# Patient Record
Sex: MALE | Race: WHITE | NOT HISPANIC OR LATINO | Employment: FULL TIME | URBAN - METROPOLITAN AREA
[De-identification: names, ages, dates, MRNs, and addresses within clinical notes are randomized per-mention and may not be internally consistent; named-entity substitution may affect disease eponyms.]

---

## 2019-12-05 ENCOUNTER — OFFICE VISIT (OUTPATIENT)
Dept: MEDICAL GROUP | Facility: IMAGING CENTER | Age: 44
End: 2019-12-05
Payer: COMMERCIAL

## 2019-12-05 VITALS
DIASTOLIC BLOOD PRESSURE: 96 MMHG | WEIGHT: 212.2 LBS | HEART RATE: 73 BPM | BODY MASS INDEX: 32.16 KG/M2 | HEIGHT: 68 IN | RESPIRATION RATE: 17 BRPM | TEMPERATURE: 98.3 F | SYSTOLIC BLOOD PRESSURE: 144 MMHG | OXYGEN SATURATION: 98 %

## 2019-12-05 DIAGNOSIS — Z13.1 SCREENING FOR DIABETES MELLITUS: ICD-10-CM

## 2019-12-05 DIAGNOSIS — G89.29 CHRONIC MIDLINE LOW BACK PAIN WITHOUT SCIATICA: ICD-10-CM

## 2019-12-05 DIAGNOSIS — Z23 NEED FOR VACCINATION: ICD-10-CM

## 2019-12-05 DIAGNOSIS — R03.0 ELEVATED BLOOD PRESSURE READING: Primary | ICD-10-CM

## 2019-12-05 DIAGNOSIS — M54.50 CHRONIC MIDLINE LOW BACK PAIN WITHOUT SCIATICA: ICD-10-CM

## 2019-12-05 DIAGNOSIS — Z13.0 SCREENING FOR DEFICIENCY ANEMIA: ICD-10-CM

## 2019-12-05 DIAGNOSIS — M54.2 NECK PAIN: ICD-10-CM

## 2019-12-05 DIAGNOSIS — Z76.89 ENCOUNTER TO ESTABLISH CARE WITH NEW DOCTOR: ICD-10-CM

## 2019-12-05 DIAGNOSIS — Z13.220 SCREENING FOR HYPERLIPIDEMIA: ICD-10-CM

## 2019-12-05 PROCEDURE — 99214 OFFICE O/P EST MOD 30 MIN: CPT | Mod: 25 | Performed by: NURSE PRACTITIONER

## 2019-12-05 ASSESSMENT — PAIN SCALES - GENERAL: PAINLEVEL: 1=MINIMAL PAIN

## 2019-12-05 ASSESSMENT — PATIENT HEALTH QUESTIONNAIRE - PHQ9: CLINICAL INTERPRETATION OF PHQ2 SCORE: 0

## 2019-12-06 PROBLEM — R03.0 ELEVATED BLOOD PRESSURE READING: Status: ACTIVE | Noted: 2019-12-06

## 2019-12-06 PROBLEM — G89.29 CHRONIC MIDLINE LOW BACK PAIN WITHOUT SCIATICA: Status: ACTIVE | Noted: 2019-12-06

## 2019-12-06 PROBLEM — M54.50 CHRONIC MIDLINE LOW BACK PAIN WITHOUT SCIATICA: Status: ACTIVE | Noted: 2019-12-06

## 2019-12-06 PROBLEM — M54.2 NECK PAIN: Status: ACTIVE | Noted: 2019-12-06

## 2019-12-06 NOTE — ASSESSMENT & PLAN NOTE
States in 2015 [facility]  States that he has never seeked care for this injury.  He said neck pain has become worse since moving to [place].  States that he has intermittent pain from the back of his right side of his head/upper neck to his right shoulder.  Describes pain as achy and sharp at times with movement.  Denies any over-the-counter medication or remedies at this time.
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
## 2019-12-06 NOTE — ASSESSMENT & PLAN NOTE
Denies knowledge of having high blood pressure in the past.  Denies any hypertensive symptoms.  States that he recently moved from [place], and does not currently have a regular exercise regimen or diet.  States he is currently living in week-to-week hotel that does not have a kitchen.  States that he is eating out regularly.  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  States that he is having a hard time meal prepping, due to the lack of kitchen and work schedule.  States he is more physically active during the winter months because he enjoys snowboarding, and looks forward to having access here in [place].

## 2019-12-06 NOTE — ASSESSMENT & PLAN NOTE
States that this is an ongoing concern.  Denies any history of trauma or injury to lower back.  Denies any sciatic-like symptoms.  Denies any red flag symptoms.  Describes pain is dull and achy.  Denies any over-the-counter medications or home remedies to relieve pain.

## 2019-12-09 PROCEDURE — 90686 IIV4 VACC NO PRSV 0.5 ML IM: CPT | Performed by: NURSE PRACTITIONER

## 2019-12-09 PROCEDURE — 90471 IMMUNIZATION ADMIN: CPT | Performed by: NURSE PRACTITIONER

## 2019-12-21 ENCOUNTER — HOSPITAL ENCOUNTER (OUTPATIENT)
Dept: LAB | Facility: MEDICAL CENTER | Age: 44
End: 2019-12-21
Attending: NURSE PRACTITIONER
Payer: COMMERCIAL

## 2019-12-21 DIAGNOSIS — R03.0 ELEVATED BLOOD PRESSURE READING: ICD-10-CM

## 2019-12-21 DIAGNOSIS — Z13.1 SCREENING FOR DIABETES MELLITUS: ICD-10-CM

## 2019-12-21 DIAGNOSIS — Z13.0 SCREENING FOR DEFICIENCY ANEMIA: ICD-10-CM

## 2019-12-21 DIAGNOSIS — Z13.220 SCREENING FOR HYPERLIPIDEMIA: ICD-10-CM

## 2019-12-21 LAB
ALBUMIN SERPL BCP-MCNC: 4.4 G/DL (ref 3.2–4.9)
ALBUMIN/GLOB SERPL: 1.4 G/DL
ALP SERPL-CCNC: 101 U/L (ref 30–99)
ALT SERPL-CCNC: 51 U/L (ref 2–50)
ANION GAP SERPL CALC-SCNC: 10 MMOL/L (ref 0–11.9)
AST SERPL-CCNC: 28 U/L (ref 12–45)
BASOPHILS # BLD AUTO: 1 % (ref 0–1.8)
BASOPHILS # BLD: 0.06 K/UL (ref 0–0.12)
BILIRUB SERPL-MCNC: 0.7 MG/DL (ref 0.1–1.5)
BUN SERPL-MCNC: 11 MG/DL (ref 8–22)
CALCIUM SERPL-MCNC: 9.2 MG/DL (ref 8.5–10.5)
CHLORIDE SERPL-SCNC: 105 MMOL/L (ref 96–112)
CHOLEST SERPL-MCNC: 258 MG/DL (ref 100–199)
CO2 SERPL-SCNC: 22 MMOL/L (ref 20–33)
CREAT SERPL-MCNC: 1.01 MG/DL (ref 0.5–1.4)
EOSINOPHIL # BLD AUTO: 0.1 K/UL (ref 0–0.51)
EOSINOPHIL NFR BLD: 1.6 % (ref 0–6.9)
ERYTHROCYTE [DISTWIDTH] IN BLOOD BY AUTOMATED COUNT: 39.8 FL (ref 35.9–50)
FASTING STATUS PATIENT QL REPORTED: NORMAL
GLOBULIN SER CALC-MCNC: 3.1 G/DL (ref 1.9–3.5)
GLUCOSE SERPL-MCNC: 236 MG/DL (ref 65–99)
HCT VFR BLD AUTO: 50 % (ref 42–52)
HDLC SERPL-MCNC: ABNORMAL MG/DL
HGB BLD-MCNC: 17.4 G/DL (ref 14–18)
IMM GRANULOCYTES # BLD AUTO: 0.01 K/UL (ref 0–0.11)
IMM GRANULOCYTES NFR BLD AUTO: 0.2 % (ref 0–0.9)
LDLC SERPL CALC-MCNC: ABNORMAL MG/DL
LYMPHOCYTES # BLD AUTO: 2.54 K/UL (ref 1–4.8)
LYMPHOCYTES NFR BLD: 40.8 % (ref 22–41)
MCH RBC QN AUTO: 30.7 PG (ref 27–33)
MCHC RBC AUTO-ENTMCNC: 34.8 G/DL (ref 33.7–35.3)
MCV RBC AUTO: 88.3 FL (ref 81.4–97.8)
MONOCYTES # BLD AUTO: 0.36 K/UL (ref 0–0.85)
MONOCYTES NFR BLD AUTO: 5.8 % (ref 0–13.4)
NEUTROPHILS # BLD AUTO: 3.16 K/UL (ref 1.82–7.42)
NEUTROPHILS NFR BLD: 50.6 % (ref 44–72)
NRBC # BLD AUTO: 0 K/UL
NRBC BLD-RTO: 0 /100 WBC
PLATELET # BLD AUTO: 163 K/UL (ref 164–446)
PMV BLD AUTO: 11 FL (ref 9–12.9)
POTASSIUM SERPL-SCNC: 4.2 MMOL/L (ref 3.6–5.5)
PROT SERPL-MCNC: 7.5 G/DL (ref 6–8.2)
RBC # BLD AUTO: 5.66 M/UL (ref 4.7–6.1)
SODIUM SERPL-SCNC: 137 MMOL/L (ref 135–145)
TRIGL SERPL-MCNC: 1182 MG/DL (ref 0–149)
WBC # BLD AUTO: 6.2 K/UL (ref 4.8–10.8)

## 2019-12-21 PROCEDURE — 36415 COLL VENOUS BLD VENIPUNCTURE: CPT

## 2019-12-21 PROCEDURE — 85025 COMPLETE CBC W/AUTO DIFF WBC: CPT

## 2019-12-21 PROCEDURE — 80061 LIPID PANEL: CPT

## 2019-12-21 PROCEDURE — 80053 COMPREHEN METABOLIC PANEL: CPT

## 2019-12-22 NOTE — RESULT ENCOUNTER NOTE
Please let patient know that we are sending his labs in letter form due to not having Mychart. Please the patient if he was fasting at the time of his lab draw because of his elevated glucose and lipid panel. We can discuss his labs at his next appointment, but it is my recommendation at this time to attempt to reach a goal of accumulation of 150 minutes or more of moderate-intensity activity each week as tolerated. Discussed a healthy diet that is low in fat, sodium, and cholesterol, such as the mediterranean diet that is typically high in fruits, vegetables, whole grains, beans, nuts, and seeds, includes olive oil as an important source of monounsaturated fat, or also considering a plant-based diet. Also please ask patient if he has been able to monitor his blood pressure. It was my recommendation at his last appointment to check his BP 10 days after his last visit.   Tiff RICH

## 2019-12-26 ENCOUNTER — TELEPHONE (OUTPATIENT)
Dept: MEDICAL GROUP | Facility: IMAGING CENTER | Age: 44
End: 2019-12-26

## 2019-12-26 NOTE — TELEPHONE ENCOUNTER
----- Message from AGUSTÍN Gee sent at 12/22/2019 12:48 PM PST -----  Please let patient know that we are sending his labs in letter form due to not having Mychart. Please the patient if he was fasting at the time of his lab draw because of his elevated glucose and lipid panel. We can discuss his labs at his next appointment, but it is my recommendation at this time to attempt to reach a goal of accumulation of 150 minutes or more of moderate-intensity activity each week as tolerated. Discussed a healthy diet that is low in fat, sodium, and cholesterol, such as the mediterranean diet that is typically high in fruits, vegetables, whole grains, beans, nuts, and seeds, includes olive oil as an important source of monounsaturated fat, or also considering a plant-based diet. Also please ask patient if he has been able to monitor his blood pressure. It was my recommendation at his last appointment to check his BP 10 days after his last visit.   Tiff RICH

## 2020-01-06 ENCOUNTER — OFFICE VISIT (OUTPATIENT)
Dept: MEDICAL GROUP | Facility: IMAGING CENTER | Age: 45
End: 2020-01-06
Payer: COMMERCIAL

## 2020-01-06 VITALS
HEART RATE: 85 BPM | RESPIRATION RATE: 14 BRPM | SYSTOLIC BLOOD PRESSURE: 126 MMHG | BODY MASS INDEX: 32.58 KG/M2 | HEIGHT: 68 IN | DIASTOLIC BLOOD PRESSURE: 84 MMHG | OXYGEN SATURATION: 96 % | WEIGHT: 215 LBS | TEMPERATURE: 98.4 F

## 2020-01-06 DIAGNOSIS — Z13.1 SCREENING FOR DIABETES MELLITUS: ICD-10-CM

## 2020-01-06 DIAGNOSIS — G89.29 CHRONIC MIDLINE LOW BACK PAIN WITHOUT SCIATICA: ICD-10-CM

## 2020-01-06 DIAGNOSIS — M54.2 NECK PAIN: ICD-10-CM

## 2020-01-06 DIAGNOSIS — M54.50 CHRONIC MIDLINE LOW BACK PAIN WITHOUT SCIATICA: ICD-10-CM

## 2020-01-06 DIAGNOSIS — R74.01 ELEVATED ALT MEASUREMENT: ICD-10-CM

## 2020-01-06 DIAGNOSIS — S81.812D LACERATION OF LEFT LOWER EXTREMITY, SUBSEQUENT ENCOUNTER: Primary | ICD-10-CM

## 2020-01-06 DIAGNOSIS — R03.0 ELEVATED BLOOD PRESSURE READING: ICD-10-CM

## 2020-01-06 DIAGNOSIS — Z13.220 SCREENING FOR HYPERLIPIDEMIA: ICD-10-CM

## 2020-01-06 PROCEDURE — 99214 OFFICE O/P EST MOD 30 MIN: CPT | Performed by: NURSE PRACTITIONER

## 2020-01-06 RX ORDER — HYDROCODONE BITARTRATE AND ACETAMINOPHEN 5; 325 MG/1; MG/1
1 TABLET ORAL
COMMUNITY
Start: 2019-12-29 | End: 2020-01-26

## 2020-01-06 RX ORDER — CEPHALEXIN 500 MG/1
500 CAPSULE ORAL
COMMUNITY
Start: 2019-12-28 | End: 2020-01-07

## 2020-01-07 NOTE — PROGRESS NOTES
Subjective:     CC: Laceration (left leg, snowboarding incident December 28th )      HPI:   Diogo presents today for follow-up for elevated blood pressure reading on 12/5/19, and recent laceration that he sustained on a recent vacation.    States that he was snowboarding with his girlfriend in [place], [place] on 12/28/19. States when his girlfriend and him were getting off the lift, and his girlfriend fell causing him to fall and landed on his girlfriend's snowboard. States he seeked emergency services at local emergency room. States he received his Tdap at ED due to outdoor wound. States and according to ED note he sustained a full thickness, 10-15 cm laceration exposing the muscle belly, at that time no visable tendinous or nerve involvement. X-ray at the time did not suggest a fracture. States that he is currently on Keflex 500 mg 4 times a day for 10 days. States that he has one more day of antibiotics. States he was prescribed Norco for the pain, but has only taken it three times since the accident. States that he was instructed to were a soft cast boot, but has stopped the past few days because of it irritating the wound. States that he also was told to use crutches, but he has just been using one.  States his pain is minimal at site of injury. States to clean wound he has been using alcohol and preparation H wipes. States that he keeps it covered while at work and takes off bandage once home. States that he has noted minimal drainage from wound. States he was told to have stitches out about 10 days after injury. States that swelling has decreased, states that wound is beginning to itch mildly, denies itching. Denies fever, chills, or general muscle aches since injury.    Elevated blood pressure reading  12/5/19 BP: 144/96.  States that he was unable to obtain blood pressure between this appointment and last appointment. States that he has been in the ED twice since last visit and BP readings were 12/28/19:  124/102, 19: 142/90, reports both times he was in pain due to acute cut on shin. Today's BP: 126/84. Denies hypertension symptoms at this time including headache, SOB, chest pain, tachycardia, an/or edema. States due to current injury he has not been exercising since accident on 19. Currently not medication for elevated blood pressure.      Chronic midline low back pain without sciatica  This is on-going concern. States that his referral was sent to a PT clinic that is not near his house, and would like a new referral sent closer to his home.    Neck pain  This is on-going concern. States that his referral was sent to a PT clinic that is not near his house, and would like a new referral sent closer to his home.    History reviewed. No pertinent past medical history.    Social History     Tobacco Use   • Smoking status: Former Smoker     Packs/day: 0.00     Types: Cigarettes     Last attempt to quit: 2017     Years since quittin.1   • Smokeless tobacco: Former User   • Tobacco comment: States that he quit in 2017, previous he would smoke when he was stressed.   Substance Use Topics   • Alcohol use: Yes     Comment: occasional - 5-10 drinks per week   • Drug use: Yes     Types: Marijuana     Comment: occasional     Current Outpatient Medications Ordered in Epic   Medication Sig Dispense Refill   • HYDROcodone-acetaminophen (NORCO) 5-325 MG Tab per tablet Take 1 Tab by mouth.     • cephALEXin (KEFLEX) 500 MG Cap Take 500 mg by mouth.     • IBUPROFEN PO Take  by mouth.       No current Epic-ordered facility-administered medications on file.      Allergies:  Codeine    Health Maintenance: Completed. Received Tdap in ED on 19.    ROS:  Constitutional: Denies fever, chills, night sweats, weight loss/gain or malaise/fatigue.   Respiratory: Denies cough, SOB at rest or activity.    Cardiovascular: Denies tachycardia, chest pain, palpitations, or  leg swelling.   Gastrointestinal: Denies N/V/C/D,  "abdominal pain, loss appetite, reflux, or hematochezia.  Genitourinary: Denies difficulty voiding, dysuria, nocturia, or hematuria.   Skin: Negative for rash or worrisome moles. See HPI.  Neurological: Negative for dizziness, focal weakness and headaches.   Endo/Heme/Allergies: Denies bruise/bleed easily, allergies.   Psychiatric/Behavioral: Denies depression, nervous/anxious, difficulty sleeping.    Objective:   Exam:  /84 (BP Location: Left arm, Patient Position: Sitting, BP Cuff Size: Adult)   Pulse 85   Temp 36.9 °C (98.4 °F) (Temporal)   Resp 14   Ht 1.727 m (5' 8\")   Wt 97.5 kg (215 lb)   SpO2 96%   BMI 32.69 kg/m²  Body mass index is 32.69 kg/m².  General: Normal appearing. No distress.  Pulmonary: Clear to ausculation.  Normal effort. No rales, ronchi, or wheezing.  Cardiovascular: Regular rate and rhythm without murmur. Pedal and radial pulses are intact and equal bilaterally.  Abdomen: Soft, nontender, nondistended. Normal bowel sounds. Liver and spleen are not palpable  Skin: Warm and dry.  No obvious lesions.  Musculoskeletal: Modified gait using one crutch. No extremity cyanosis or clubbing. Left lower anterior shin with mild edema, non-pitting. Well perfused above and below laceration. 15 cm healing laceration with intact sutures,well approximated, mild erythema at suture line, no redness around site of injury, or streaking from site. Erythema at site blanchable. Scant amount of dried crusted yellow drainage at incision line. No bleeding or added warmth at time. No bruising noted. Lower extremity muscle strength 5/5, DTR +2.   Psych: Normal mood and affect. Alert and oriented x3. Judgment and insight is normal.    Assessment & Plan:   1. Laceration of left lower extremity, subsequent encounter  Instructed to complete oral antibiotics. Discussed referral to wound clinic to assess wound and for suture removal, verbalized understanding. Discussed symptoms of infection at site, and instructed " to RTC if increase pain, itchiness, drainage, decreased perfusion, swelling, chills, fever, or general body aches. Instructed to stop using alcohol and wipes to incision. Instructed to only clean with showering and mild soap without scrubbing incision. Instructed to change bandage regularly. Encouraged to use ice up to 4 times a day for 20 minutes each time for swelling. Instructed to take 200-600 mg Ibuprofen up to TID for no longer than 3 days. To take with food to avoid GI upset. Discussed referral to Sports Medicine for further elevation for muscle involvement. At this time continue to use crutches until evaluation by Sports Medicine, verbalized understanding. Discussed referral and benefit of PT, verbalized understanding.    - REFERRAL TO SPORTS MEDICINE  - REFERRAL TO PHYSICAL THERAPY Reason for Therapy: Eval/Treat/Report  - REFERRAL TO WOUND CLINIC    3. Neck pain  Discussed sending referral to PT for clinic near home and work, verbalized understanding.  - REFERRAL TO PHYSICAL THERAPY Reason for Therapy: Eval/Treat/Report    4. Chronic midline low back pain without sciatica  Discussed sending referral to PT for clinic near home and work, verbalized understanding.  - REFERRAL TO PHYSICAL THERAPY Reason for Therapy: Eval/Treat/Report    5. Screening for hyperlipidemia  6. Elevated ALT measurement  7. Screening for diabetes mellitus  Discussed with patient repeating labs due to not appropriately fasting. Discussed with patient not eating and only drinking water for 8-10 hours before lab draw, verbalized understanding.    - Lipid Profile; Future  - Comp Metabolic Panel; Future  - HEMOGLOBIN A1C; Future      Return in about 2 weeks (around 1/20/2020).    Please note that this dictation was created using voice recognition software. I have made every reasonable attempt to correct obvious errors, but I expect that there are errors of grammar and possibly content that I did not discover before finalizing the  note.

## 2020-01-07 NOTE — ASSESSMENT & PLAN NOTE
12/5/19 BP: 144/96.  States that he was unable to obtain blood pressure between this appointment and last appointment. States that he has been in the ED twice since last visit and BP readings were 12/28/19: 124/102, 12/29/19: 142/90, reports both times he was in pain due to acute cut on shin. Today's BP: 126/84. Denies hypertension symptoms at this time including headache, SOB, chest pain, tachycardia, an/or edema. States due to current injury he has not been exercising since accident on 12/28/19. Currently not medication for elevated blood pressure.

## 2020-01-07 NOTE — ASSESSMENT & PLAN NOTE
This is on-going concern. States that his referral was sent to a PT clinic that is not near his house, and would like a new referral sent closer to his home.

## 2020-01-10 ENCOUNTER — OFFICE VISIT (OUTPATIENT)
Dept: MEDICAL GROUP | Facility: IMAGING CENTER | Age: 45
End: 2020-01-10
Payer: COMMERCIAL

## 2020-01-10 VITALS
DIASTOLIC BLOOD PRESSURE: 82 MMHG | HEART RATE: 92 BPM | TEMPERATURE: 98.6 F | RESPIRATION RATE: 16 BRPM | BODY MASS INDEX: 31.67 KG/M2 | SYSTOLIC BLOOD PRESSURE: 122 MMHG | OXYGEN SATURATION: 96 % | HEIGHT: 68 IN | WEIGHT: 209 LBS

## 2020-01-10 DIAGNOSIS — S81.812D LACERATION OF LEFT LOWER EXTREMITY, SUBSEQUENT ENCOUNTER: ICD-10-CM

## 2020-01-10 PROCEDURE — 99212 OFFICE O/P EST SF 10 MIN: CPT | Performed by: NURSE PRACTITIONER

## 2020-01-10 RX ORDER — DOXYCYCLINE HYCLATE 100 MG
100 TABLET ORAL 2 TIMES DAILY
Qty: 14 TAB | Refills: 0 | Status: SHIPPED | OUTPATIENT
Start: 2020-01-10 | End: 2020-01-26

## 2020-01-10 ASSESSMENT — PATIENT HEALTH QUESTIONNAIRE - PHQ9: CLINICAL INTERPRETATION OF PHQ2 SCORE: 0

## 2020-01-11 ENCOUNTER — HOSPITAL ENCOUNTER (OUTPATIENT)
Dept: LAB | Facility: MEDICAL CENTER | Age: 45
End: 2020-01-11
Attending: NURSE PRACTITIONER
Payer: COMMERCIAL

## 2020-01-11 DIAGNOSIS — R74.01 ELEVATED ALT MEASUREMENT: ICD-10-CM

## 2020-01-11 DIAGNOSIS — Z13.1 SCREENING FOR DIABETES MELLITUS: ICD-10-CM

## 2020-01-11 DIAGNOSIS — Z13.220 SCREENING FOR HYPERLIPIDEMIA: ICD-10-CM

## 2020-01-11 LAB
ALBUMIN SERPL BCP-MCNC: 4.5 G/DL (ref 3.2–4.9)
ALBUMIN/GLOB SERPL: 1.6 G/DL
ALP SERPL-CCNC: 88 U/L (ref 30–99)
ALT SERPL-CCNC: 47 U/L (ref 2–50)
ANION GAP SERPL CALC-SCNC: 8 MMOL/L (ref 0–11.9)
AST SERPL-CCNC: 28 U/L (ref 12–45)
BILIRUB SERPL-MCNC: 0.7 MG/DL (ref 0.1–1.5)
BUN SERPL-MCNC: 16 MG/DL (ref 8–22)
CALCIUM SERPL-MCNC: 9.4 MG/DL (ref 8.5–10.5)
CHLORIDE SERPL-SCNC: 105 MMOL/L (ref 96–112)
CHOLEST SERPL-MCNC: 284 MG/DL (ref 100–199)
CO2 SERPL-SCNC: 23 MMOL/L (ref 20–33)
CREAT SERPL-MCNC: 1.03 MG/DL (ref 0.5–1.4)
EST. AVERAGE GLUCOSE BLD GHB EST-MCNC: 275 MG/DL
FASTING STATUS PATIENT QL REPORTED: NORMAL
GLOBULIN SER CALC-MCNC: 2.8 G/DL (ref 1.9–3.5)
GLUCOSE SERPL-MCNC: 246 MG/DL (ref 65–99)
HBA1C MFR BLD: 11.2 % (ref 0–5.6)
HDLC SERPL-MCNC: ABNORMAL MG/DL
LDLC SERPL CALC-MCNC: ABNORMAL MG/DL
POTASSIUM SERPL-SCNC: 4.3 MMOL/L (ref 3.6–5.5)
PROT SERPL-MCNC: 7.3 G/DL (ref 6–8.2)
SODIUM SERPL-SCNC: 136 MMOL/L (ref 135–145)
TRIGL SERPL-MCNC: 1382 MG/DL (ref 0–149)

## 2020-01-11 PROCEDURE — 83036 HEMOGLOBIN GLYCOSYLATED A1C: CPT

## 2020-01-11 PROCEDURE — 80053 COMPREHEN METABOLIC PANEL: CPT

## 2020-01-11 PROCEDURE — 36415 COLL VENOUS BLD VENIPUNCTURE: CPT

## 2020-01-11 PROCEDURE — 80061 LIPID PANEL: CPT

## 2020-01-11 NOTE — PROCEDURES
Suture Removal  Date/Time: 1/11/2020 11:00 AM  Performed by: AGUSTÍN Gee  Authorized by: AGUSTÍN Gee   Body area: lower extremity  Location details: left lower leg  Wound Appearance: red, clean and tender  Sutures Removed: 11  Post-removal: Steri-Strips applied  Patient tolerance: Patient tolerated the procedure well with no immediate complications  Comments: Amount of sutures is undocumented by placing facility. Sutures placed on 12/28/19, recommended to remove after 10 days by PCP. Sutures counted with patient, and agreed that 11 sutures were visible for removal. Scant amount of bleeding at site with removal. Pressure applied and able to stop with ease, no complications. Suture remained well approximated with removal, steri-strips placed in alternating fashion. Scant amount of drainage noted at site, crusted yellow, no bruising noted, improving swelling noted around laceration and lower left leg. Good perfusion at site, above, and below laceration. At proximal site near knee, wound continues to have mild erythema. Removal completed in a clean technique fashion.

## 2020-01-11 NOTE — PROGRESS NOTES
Denies worsening pain, continues to use one crutch for support when walking. States that by the end of the day that his leg is sore from standing all day. Denies fever or chills, or joint pain above or below laceration site. States that he is using heat and ice for pain control. States that he has stopped using rubbing alcohol and preparation H wipes to laceration since last visit.    Plan:   Patient instructed to use only ice on healing laceration, 4 times a day up to 20 minutes each time. Instructed not to use due to increase risk of infection. Instructed to take Ibuprofen 200-600 mg up to TID as tolerated for pain with food to prevent GI upset and GI bleeding. Instructed to not take longer than 3 days in a row.  Instructed to keep steri-strips on until they fall off naturally. May replace steri-strips if they fall off in 24 hours. Instruction of how to do this given to patient. Instructed to not scrub site in shower to just let soap water flow over laceration. Instructed to maintain appointment with Dr. Mele Guerrero for further evaluation of muscle involvement from injury. Discussed with patient continue antibiotic treatment due to continued erythema of laceration of doxycycline 100 mg BID for 7 days, verbalized understanding.    Follow up in 7 days for further evaluation of laceration.

## 2020-01-13 ENCOUNTER — TELEPHONE (OUTPATIENT)
Dept: MEDICAL GROUP | Facility: IMAGING CENTER | Age: 45
End: 2020-01-13

## 2020-01-13 ENCOUNTER — OFFICE VISIT (OUTPATIENT)
Dept: MEDICAL GROUP | Facility: CLINIC | Age: 45
End: 2020-01-13
Payer: COMMERCIAL

## 2020-01-13 VITALS
WEIGHT: 209 LBS | BODY MASS INDEX: 31.67 KG/M2 | HEART RATE: 88 BPM | HEIGHT: 68 IN | RESPIRATION RATE: 16 BRPM | TEMPERATURE: 98.6 F | SYSTOLIC BLOOD PRESSURE: 122 MMHG | DIASTOLIC BLOOD PRESSURE: 82 MMHG | OXYGEN SATURATION: 97 %

## 2020-01-13 DIAGNOSIS — S81.812D LACERATION OF LEFT LOWER EXTREMITY, SUBSEQUENT ENCOUNTER: ICD-10-CM

## 2020-01-13 DIAGNOSIS — M79.605 PAIN OF LEFT LOWER EXTREMITY: ICD-10-CM

## 2020-01-13 PROCEDURE — 99202 OFFICE O/P NEW SF 15 MIN: CPT | Performed by: FAMILY MEDICINE

## 2020-01-13 ASSESSMENT — ENCOUNTER SYMPTOMS
SHORTNESS OF BREATH: 0
DIZZINESS: 0
VOMITING: 0
NAUSEA: 0
FEVER: 0
CHILLS: 0

## 2020-01-13 NOTE — TELEPHONE ENCOUNTER
----- Message from AGUSTÍN Gee sent at 1/13/2020  9:43 AM PST -----  Please let patient know that his total cholesterol and triglycerides are elevated. Also his A1c and fasting glucose are also elevated. We will discuss this further at his scheduled appointment on 1/17/2020.  His elevated lipid panel and A1c both warrant developing a plan of care to treat. We need him to reschedule for an earlier time that day if possible be able to have Darline provide him with diabetic information. If he can't reschedule I will provide this education.  Thank you,   HILARIO Gee

## 2020-01-13 NOTE — RESULT ENCOUNTER NOTE
Please let patient know that his total cholesterol and triglycerides are elevated. Also his A1c and fasting glucose are also elevated. We will discuss this further at his scheduled appointment on 1/17/2020.  His elevated lipid panel and A1c both warrant developing a plan of care to treat. We need him to reschedule for an earlier time that day if possible be able to have Darline provide him with diabetic information. If he can't reschedule I will provide this education.  Thank you,   Tiff Young, SHOSHANAC

## 2020-01-14 ENCOUNTER — APPOINTMENT (OUTPATIENT)
Dept: WOUND CARE | Facility: MEDICAL CENTER | Age: 45
End: 2020-01-14
Attending: NURSE PRACTITIONER
Payer: COMMERCIAL

## 2020-01-14 NOTE — PROGRESS NOTES
Subjective:      Diogo Keen is a 44 y.o. male who presents with Leg Injury (Referral from PCP/ L leg injury )     Referred by PABLO Gee for evaluation of LEFT leg laceration    HPI   LEFT leg laceration  DOI, 12/28/19  Getting off chair lift while snowboarding in [facility] and landed on his significant other's snowboard  Deep laceration  Seen in Er for deep laceration with muscle belly showing  Improved  Wound healing nicely  Has some pain with deep squatting  Taking ibuprofen and tylenol most days  Dull ache  Lateral proximal LEFT leg  No radiation  Improved with resting and ambulating with cane assistance  No night symptoms    Likes to snowboard  Teacher, 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   Respiratory: Negative for shortness of breath.    Cardiovascular: Negative for chest pain.   Gastrointestinal: Negative for nausea and vomiting.   Neurological: Negative for dizziness.     PMH:  has no past medical history on file.  MEDS:   Current Outpatient Medications:   •  doxycycline (VIBRAMYCIN) 100 MG Tab, Take 1 Tab by mouth 2 times a day., Disp: 14 Tab, Rfl: 0  •  HYDROcodone-acetaminophen (NORCO) 5-325 MG Tab per tablet, Take 1 Tab by mouth., Disp: , Rfl:   •  IBUPROFEN PO, Take  by mouth., Disp: , Rfl:   ALLERGIES:   Allergies   Allergen Reactions   • Codeine      Upset stomach     SURGHX:   Past Surgical History:   Procedure Laterality Date   • APPENDECTOMY       SOCHX:  reports that he quit smoking about 2 years ago. His smoking use included cigarettes. He smoked 0.00 packs per day. He has quit using smokeless tobacco. He reports current alcohol use. He reports current drug use. Drug: Marijuana.  FH: Family history was reviewed, no pertinent findings to report     Objective:     /82 (BP Location: Left arm, Patient Position: Sitting, BP Cuff Size: Large adult)   Pulse 88   Temp 37 °C (98.6 °F) (Temporal)   Resp  "16   Ht 1.727 m (5' 8\")   Wt 94.8 kg (209 lb)   SpO2 97%   BMI 31.78 kg/m²      Physical Exam     RIGHT leg:  There is NO swelling noted at the leg  Range of motion intact with dorsiflexion and plantarflexion, inversion and eversion  The foot and ankle is otherwise neurovascularly intact    LEFT leg:  LEFT leg scar healing well, no discharge  There is NO swelling noted at the ankle  Good strength with active dorsiflexion, plantarflexion, inversion and eversion of the LEFT ankle  Some mild difficulty walking on his LEFT heel, but he is able to do so  The foot and ankle is otherwise neurovascularly intact    LEFT FOOT:  There is NO swelling noted at the foot  There is NO tenderness at the base of the fifth metatarsal, cuboid, or tarsal navicular  There is NO pain with metatarsal squeeze test    NEUTRAL stance  Able to ambulate with MILDLY antalgic gait     Assessment/Plan:     1. Pain of left lower extremity     2. Laceration of left lower extremity, subsequent encounter       DOI, 12/28/19  Getting off chair lift while snowboarding in [facility] and landed on his significant other's snowboard  Deep laceration  Apparently, lesion was to the muscle belly  He still had functioning muscle at the time of laceration which he showed me on his phone (video)    Currently, he seems to be doing very well  He seems to be healing as expected    Recommended AVOIDING any athletic activity until at least 1 month from the injury (end of January 2020)  Prior to even considering going up for snowboarding he should verify that he can go for this and jog/run without any pain.    For now, he can follow-up with me as needed  He has been instructed that he can reach us by [employer] message if he has any questions or issues  Obviously if he has worsening of symptoms or any other problems he should come back in for repeat visit    Thank you PABLO Gee for allowing me to participate in caring for your patient.  "

## 2020-01-17 ENCOUNTER — OFFICE VISIT (OUTPATIENT)
Dept: MEDICAL GROUP | Facility: IMAGING CENTER | Age: 45
End: 2020-01-17
Payer: COMMERCIAL

## 2020-01-17 VITALS
RESPIRATION RATE: 16 BRPM | WEIGHT: 206.4 LBS | DIASTOLIC BLOOD PRESSURE: 88 MMHG | TEMPERATURE: 98.4 F | OXYGEN SATURATION: 97 % | HEART RATE: 87 BPM | HEIGHT: 68 IN | SYSTOLIC BLOOD PRESSURE: 128 MMHG | BODY MASS INDEX: 31.28 KG/M2

## 2020-01-17 DIAGNOSIS — Z79.899 ENCOUNTER FOR LONG-TERM CURRENT USE OF MEDICATION: ICD-10-CM

## 2020-01-17 DIAGNOSIS — E78.1 HYPERTRIGLYCERIDEMIA: ICD-10-CM

## 2020-01-17 DIAGNOSIS — S81.812D LACERATION OF LEFT LOWER EXTREMITY, SUBSEQUENT ENCOUNTER: ICD-10-CM

## 2020-01-17 DIAGNOSIS — E11.69 TYPE 2 DIABETES MELLITUS WITH OTHER SPECIFIED COMPLICATION, WITHOUT LONG-TERM CURRENT USE OF INSULIN (HCC): Primary | ICD-10-CM

## 2020-01-17 DIAGNOSIS — R73.09 ELEVATED HEMOGLOBIN A1C: ICD-10-CM

## 2020-01-17 PROCEDURE — 99215 OFFICE O/P EST HI 40 MIN: CPT | Performed by: NURSE PRACTITIONER

## 2020-01-17 RX ORDER — FENOFIBRATE 40 MG/1
1 TABLET ORAL DAILY
Qty: 30 TAB | Refills: 1 | Status: SHIPPED | OUTPATIENT
Start: 2020-01-17 | End: 2020-02-14

## 2020-01-17 RX ORDER — LANCETS 30 GAUGE
EACH MISCELLANEOUS
Qty: 100 EACH | Refills: 0 | Status: SHIPPED | OUTPATIENT
Start: 2020-01-17 | End: 2020-02-07

## 2020-01-17 RX ORDER — GLUCOSAMINE HCL/CHONDROITIN SU 500-400 MG
CAPSULE ORAL
Qty: 100 EACH | Refills: 0 | Status: SHIPPED | OUTPATIENT
Start: 2020-01-17 | End: 2020-06-05

## 2020-01-17 ASSESSMENT — PAIN SCALES - GENERAL: PAINLEVEL: NO PAIN

## 2020-01-18 NOTE — PATIENT INSTRUCTIONS
An accumulation of 150 minutes or more of moderate-intensity activity each week as tolerated. Discussed a healthy diet that is low in fat, sodium, and cholesterol, such as the mediterranean diet that is typically high in fruits, vegetables, whole grains, beans, nuts, and seeds, includes olive oil as an important source of monounsaturated fat, or also consider a plant-based diet.    Goal A1c is less than 7%, your current A1c is 11.2%  -Metformin 500 mg in the evening for 10 days, then 500 mg in the am and pm for 10 days with meals. Possible loose stool.  Fenoglide 40 mg daily in evening for 4 weeks.  CoQ10 daily  Omega 3 Fish oil daily

## 2020-01-19 PROBLEM — E78.1 HYPERTRIGLYCERIDEMIA: Status: ACTIVE | Noted: 2020-01-19

## 2020-01-19 PROBLEM — E11.9 DIABETES MELLITUS (HCC): Status: ACTIVE | Noted: 2020-01-19

## 2020-01-19 SDOH — HEALTH STABILITY: MENTAL HEALTH: HOW OFTEN DO YOU HAVE 6 OR MORE DRINKS ON ONE OCCASION?: LESS THAN MONTHLY

## 2020-01-19 SDOH — HEALTH STABILITY: MENTAL HEALTH: HOW MANY STANDARD DRINKS CONTAINING ALCOHOL DO YOU HAVE ON A TYPICAL DAY?: 3 OR 4

## 2020-01-19 SDOH — HEALTH STABILITY: MENTAL HEALTH: HOW OFTEN DO YOU HAVE A DRINK CONTAINING ALCOHOL?: 4 OR MORE TIMES A WEEK

## 2020-01-19 NOTE — PROGRESS NOTES
"Subjective:   CC: Lab Results    HPI:   Diogo presents today for follow-up on eft leg laceration and to review recent lab results.    Stated that he has about 2 more days of Doxycycline. States that he didn't take the medication for about a day due to GI upset. States that he has been taking \"OneAway\" which is helping. States that he will continue to take medication until is gone. States that he saw Dr. Guerrero, and he felt that he will regain full strength of muscle with increased activity. States that Dr. Guerrero encouraged focusing on lower leg exercises at this time. States that Yolanda stated that he could attempt to snowboard at the end of the month. Patient states that he is going to wait til mid-February until he attempts after he starts physical therapy. States that he is not experiencing daily pain at incision site. States that he is occasionally experiencing discomfort at the end of the day after standing all day. States that he is intermittently using a cane. States that he is having a lag in his heel to toe gait on left side. States that he feels weak in his push-off in his gait on his left foot.  Denies fever, chills, body aches, or complication at site. States that he has been allowing soapy water cleanse the laceration while he is in the shower. Denies needing to reapply any steri-strips.     Hypertriglyceridemia  Lab Results   Component Value Date/Time    CHOLSTRLTOT 284 (H) 01/11/2020 11:45 AM    LDL see below 01/11/2020 11:45 AM    HDL see below 01/11/2020 11:45 AM    TRIGLYCERIDE 1382 (H) 01/11/2020 11:45 AM       States that when he was in college about when he was 24 years old he was told he had elevated cholesterol. Denies ever seeking treatment or follow-up. Denies any on-going abdomen pain. States that when bends over at a certain way he feels pressure under his left lower rib, like it is pushing on something, denies causing pain. States that this has been going on for the past " "6 months.      Diabetes mellitus (HCC)  States he recently joined a gym with his girlfriend. Denies regular excerise currently due to recent left leg injury. States that he is hoping to start exercising regularly as his leg continues to heal. Recently saw Dr. Guerrero who suggested he continues to strengthen his left leg with focused leg exercises. States that he has physical therapy scheduled for 2/3/2020. States that he drinks alcohol daily, about 2-3 mixed drinks. States he drinks toxic water with hard alcohol. States he feels like he doesn't \"eat too bad\". States that tries to make healthy decision, but recently has been living in a weekly apartment rental that has limited cooking space. Denies history of T2DM or prediabetes. States that his mother has T2DM.  States that he recently got an exam done at [facility], uncertain what type of examine was done.  2020 A1c 11.2  Lab Results   Component Value Date/Time    SODIUM 136 2020 11:45 AM    POTASSIUM 4.3 2020 11:45 AM    CHLORIDE 105 2020 11:45 AM    CO2 23 2020 11:45 AM    GLUCOSE 246 (H) 2020 11:45 AM    BUN 16 2020 11:45 AM    CREATININE 1.03 2020 11:45 AM          History reviewed. No pertinent past medical history.    Social History     Tobacco Use   • Smoking status: Former Smoker     Packs/day: 0.00     Types: Cigarettes     Last attempt to quit: 2017     Years since quittin.1   • Smokeless tobacco: Former User   • Tobacco comment: States that he quit in 2017, previous he would smoke when he was stressed.   Substance Use Topics   • Alcohol use: Yes     Alcohol/week: 1.8 oz     Types: 3 Standard drinks or equivalent per week     Frequency: 4 or more times a week     Drinks per session: 3 or 4     Binge frequency: Less than monthly   • Drug use: Yes     Types: Marijuana     Comment: occasional     Current Outpatient Medications Ordered in Epic   Medication Sig Dispense Refill   • metFORMIN " (GLUCOPHAGE) 500 MG Tab Take 1 Tab by mouth 2 times a day, with meals. 90 Tab 0   • Blood Glucose Meter Kit Test blood sugar as recommended by provider. Accucheck Laura blood glucose monitoring kit. 1 Kit 0   • Blood Glucose Test Strips Use one Accucheck Laura strip to test blood sugar twice daily. 100 Strip 0   • Lancets Use one Accucheck Laura lancet to test blood sugar twice daily. 100 Each 0   • Alcohol Swabs Wipe site with prep pad prior to injection. 100 Each 0   • Fenofibrate 40 MG Tab Take 1 Tab by mouth every day. 30 Tab 1   • Dulaglutide 0.75 MG/0.5ML Solution Pen-injector Inject 0.75 mg as instructed every 7 days. 4 PEN 1   • doxycycline (VIBRAMYCIN) 100 MG Tab Take 1 Tab by mouth 2 times a day. 14 Tab 0   • HYDROcodone-acetaminophen (NORCO) 5-325 MG Tab per tablet Take 1 Tab by mouth.     • IBUPROFEN PO Take  by mouth.       No current Epic-ordered facility-administered medications on file.      Allergies:  Codeine    Health Maintenance: Completed. UTD.    ROS:  Constitutional: Denies fever, chills, night sweats, weight loss/gain or malaise/fatigue.    Eyes: Denies changes in vision, pain. Wears corrective wear.   Respiratory: Denies cough, SOB at rest or activity.    Cardiovascular: Denies tachycardia, chest pain, palpitations, or  leg swelling.   Gastrointestinal: Denies N/V/C/D, abdominal pain, loss appetite, reflux, or hematochezia.  Genitourinary: Denies difficulty voiding, dysuria, nocturia, or hematuria.   Skin: Negative for rash or worrisome moles, see HPI.   Neurological: Negative for dizziness, focal weakness and headaches.   Endo/Heme/Allergies: Denies bruise/bleed easily, allergies.   Psychiatric/Behavioral: Denies depression, nervous/anxious, difficulty sleeping.  MSK: History of ongoing lower back pain and neck pain.    Objective:   Exam:  /88 (BP Location: Right arm, Patient Position: Sitting, BP Cuff Size: Adult)   Pulse 87   Temp 36.9 °C (98.4 °F) (Temporal)   Resp 16   Ht  "1.727 m (5' 8\")   Wt 93.6 kg (206 lb 6.4 oz)   SpO2 97%   BMI 31.38 kg/m²  Body mass index is 31.38 kg/m².    Constitutional: Alert, no distress..  Skin: Warm, dry, good turgor, no rashes in visible areas. Well perfused above and below laceration. 15 cm healing laceration with intact steri-strips, well approximated. Improved erythema at suture line, no redness around site of injury, or streaking from site. Erythema at site blanchable. No drainage, bleeding, or added warmth at time. No bruising noted.  Eye: Equal, round and reactive, conjunctiva clear, lids normal.  ENMT: Lips without lesions, good dentition, moist mucous membranes.  Neck: Trachea midline, no masses, no thyromegaly.  Respiratory: Unlabored respiratory effort, no cough.  MSK: Improved modified gait using one cane. No extremity cyanosis or clubbing. Left lower anterior shin withimproving mild edema, non-pitting.Lower extremity muscle strength 5/5, DTR +2.   Neuro: Grossly non-focal.   Psych: Alert and oriented x3, normal affect and mood.   Judgment and insight is normal.    Assessment & Plan:   1. Encounter for long-term current use of medication  2. Type 2 diabetes mellitus with other specified complication, without long-term current use of insulin (HCC)  3. Elevated hemoglobin A1c  This is a new acute concern. Discussed with patient recent lab results and that his elevated A1c places him in the T2DM category, verbalized understanding.  Discussed with patient extensive plan of care to lower A1c to below 7%, verbalized understanding. Discussed an accumulation of 150 minutes or more of moderate-intensity activity each week as tolerated. Discussed a healthy diet that is low in fat, sodium, and cholesterol, such as the mediterranean diet that is typically high in fruits, vegetables, whole grains, beans, nuts, and seeds, includes olive oil as an important source of monounsaturated fat, DASH diet, and/or also consider a plant-based diet. Encouraged to " stop drinking or lower intake. Discussed if not willing to stop drinking, ways he can cut the calories/sugars of his drink. Instructed to not to drink beer.  Discussed referral to diabetic educator. Discussed patient getting all his supplies first so they can show him how to check his blood glucose and give injectable medication. Discussed starting Metformin 500 mg in the evening for 10 days, then adding 500 mg in the am and pm for 10 days with meals now. Continung this treatment plan if tolerated. Discussed plan of care will be to maximize dosage of 2000 mg daily, if tolerated. Discussed side effects with patient, if he experiences GI upset he is to inform PCP and will change to ER, verbalized understanding. Instructed once he has obtained Dulaglutide diabetic educator will show him to use it. If he cannot see an educator within a week, instructed to RTC and PCP or RN will educate him how to use it, show him how to use his glucometer, and discussed target fasting glucose of 120-135. Discussed on-going need to assess kidney function, assess for comorbidity of hypothyroidism, retinal eye exam, and regular teeth cleaning, verbalized understanding. This well be discussed further at next appointment. Instructed to RTC in 7 days for follow-up.    - REFERRAL TO DIABETIC EDUCATION Diabetes Self Management Education / Training (DSME/T) and Medical Nutrition Therapy (MNT): Initial Group DSME/MNT as authorized by payor, Follow-Up DSME/MNT as authorized by payor; DSME/T Content: Monitoring Diabete...  - metFORMIN (GLUCOPHAGE) 500 MG Tab; Take 1 Tab by mouth 2 times a day, with meals.  Dispense: 90 Tab; Refill: 0  -     Blood Glucose Meter Kit; Test blood sugar as recommended by provider. Accucheck Laura blood glucose monitoring kit.  -     Blood Glucose Test Strips; Use one Accucheck Laura strip to test blood sugar twice daily.  -     Lancets; Use one Accucheck Laura lancet to test blood sugar twice daily.  -     Alcohol  Swabs; Wipe site with prep pad prior to injection.  -     Dulaglutide 0.75 MG/0.5ML Solution Pen-injector; Inject 0.75 mg as instructed every 7 days.    4. Hypertriglyceridemia  This is an acute concern. Discussed with patient recent lipid panel. Due to cholesterol and triglycerides being so high unable to calculate LDL and HDL. Stressed the importance of lowering triglycerides due to high risk of pancreatitis and CVD at current levels, verbalized understanding. Discussed starting Fenofibrate 40 mg daily in the evening. Instructed to take with COQ10 daily to decrease possible side effect of muscle cramping. Possible side effects discussed with patient, verbalized understanding. Informed that after taking medication for 14 days, we will check an CMP to assess for elevated liver enzymes. Stable and wnl at this time. Instructed that we will increase dose in 4 weeks to 80 mg and again in 4 weeks to 120 mg, if tolerated.  Verbalized understanding. Instructed to take OTC Omega 3 fish oil daily. Discussed possible starting statin if he tolerates fenofibrate. Discussed as mentioned above that an increase in exercise and a tighter diet including decrease alcohol intake will assess in decreasing his overall cholesterol panel, verbalized understanding. Discussed controlling T2DM will also contribute to improved levels.     - Fenofibrate 40 MG Tab; Take 1 Tab by mouth every day.  Dispense: 30 Tab; Refill: 1    5. Laceration of left lower extremity, subsequent encounter  This is a stable condition.  Patient instructed to use ice on healing laceration, 4 times a day up to 20 minutes each time. Instructed to take Ibuprofen 200-600 mg up to TID as tolerated for pain with food to prevent GI upset and GI bleeding. Instructed to not take longer than 3 days in a row.  Instructed to keep steri-strips on until they fall off naturally.  Instructed to continue to not scrub site in shower to just let soap water flow over laceration.  Discussed continuing antibiotic treatment of doxycycline 100 mg BID for until it is completed, verbalized understanding. Discussed starting lower extremity exercises to start strengthen involved muscles from injury. Examples given to patient. Will start physical therapy 2/3/2020 due to current work schedule.      Patient was seen for 60 minutes face to face of which, at least 50% of the time was spent counseling regarding the above plan of care.    Return in about 1 week (around 1/24/2020).    Please note that this dictation was created using voice recognition software. I have made every reasonable attempt to correct obvious errors, but I expect that there are errors of grammar and possibly content that I did not discover before finalizing the note.

## 2020-01-19 NOTE — ASSESSMENT & PLAN NOTE
"States he recently joined a gym with his girlfriend. Denies regular excerise currently due to recent left leg injury. States that he is hoping to start exercising regularly as his leg continues to heal. Recently saw Dr. Guerrero who suggested he continues to strengthen his left leg with focused leg exercises. States that he has physical therapy scheduled for 2/3/2020. States that he drinks alcohol daily, about 2-3 mixed drinks. States he drinks toxic water with hard alcohol. States he feels like he doesn't \"eat too bad\". States that tries to make healthy decision, but recently has been living in a weekly apartment rental that has limited cooking space. Denies history of T2DM or prediabetes. States that his mother has T2DM.  States that he recently got an exam done at [facility], uncertain what type of examine was done.  1/11/2020 A1c 11.2  Lab Results   Component Value Date/Time    SODIUM 136 01/11/2020 11:45 AM    POTASSIUM 4.3 01/11/2020 11:45 AM    CHLORIDE 105 01/11/2020 11:45 AM    CO2 23 01/11/2020 11:45 AM    GLUCOSE 246 (H) 01/11/2020 11:45 AM    BUN 16 01/11/2020 11:45 AM    CREATININE 1.03 01/11/2020 11:45 AM        "

## 2020-01-19 NOTE — ASSESSMENT & PLAN NOTE
Lab Results   Component Value Date/Time    CHOLSTRLTOT 284 (H) 01/11/2020 11:45 AM    LDL see below 01/11/2020 11:45 AM    HDL see below 01/11/2020 11:45 AM    TRIGLYCERIDE 1382 (H) 01/11/2020 11:45 AM       States that when he was in college about when he was 24 years old he was told he had elevated cholesterol. Denies ever seeking treatment or follow-up. Denies any on-going abdomen pain. States that when bends over at a certain way he feels pressure under his left lower rib, like it is pushing on something, denies causing pain. States that this has been going on for the past 6 months.

## 2020-01-24 ENCOUNTER — OFFICE VISIT (OUTPATIENT)
Dept: MEDICAL GROUP | Facility: IMAGING CENTER | Age: 45
End: 2020-01-24
Payer: COMMERCIAL

## 2020-01-24 VITALS
TEMPERATURE: 98 F | RESPIRATION RATE: 12 BRPM | DIASTOLIC BLOOD PRESSURE: 82 MMHG | SYSTOLIC BLOOD PRESSURE: 124 MMHG | WEIGHT: 207 LBS | BODY MASS INDEX: 31.37 KG/M2 | HEIGHT: 68 IN | OXYGEN SATURATION: 95 % | HEART RATE: 88 BPM

## 2020-01-24 DIAGNOSIS — S81.812D LACERATION OF SKIN OF LEFT LOWER LEG, SUBSEQUENT ENCOUNTER: ICD-10-CM

## 2020-01-24 DIAGNOSIS — E78.1 HYPERTRIGLYCERIDEMIA: ICD-10-CM

## 2020-01-24 DIAGNOSIS — Z79.899 ENCOUNTER FOR LONG-TERM CURRENT USE OF MEDICATION: ICD-10-CM

## 2020-01-24 DIAGNOSIS — E11.69 TYPE 2 DIABETES MELLITUS WITH OTHER SPECIFIED COMPLICATION, WITHOUT LONG-TERM CURRENT USE OF INSULIN (HCC): Primary | ICD-10-CM

## 2020-01-24 PROCEDURE — 99214 OFFICE O/P EST MOD 30 MIN: CPT | Performed by: NURSE PRACTITIONER

## 2020-01-24 RX ORDER — CHLORAL HYDRATE 500 MG
1000 CAPSULE ORAL DAILY
COMMUNITY
End: 2022-03-17

## 2020-01-26 RX ORDER — BLOOD SUGAR DIAGNOSTIC
STRIP MISCELLANEOUS
COMMUNITY
Start: 2020-01-17 | End: 2020-06-28 | Stop reason: SDUPTHER

## 2020-01-26 RX ORDER — BLOOD-GLUCOSE METER
EACH MISCELLANEOUS
COMMUNITY
Start: 2020-01-17

## 2020-01-26 NOTE — PROGRESS NOTES
Subjective:   CC: Diabetes    HPI:   Diogo presents today for follow up since last appointment being diagnosed with Type 2 diabetes. Here today to discuss how to check blood glucose using his recently obtained glucometer and how to give himself his Dulaglutide. His girlfriend, Arcelia is attending his appointment today for support.    Denies any complications in the healing of his laceration on left shin. States that he completed his 2 round of antibiotics. States that he is doing the exercises that were discuss at his last appointment. States he feels that his gait continues to improve. States that his steri-strips has fallen off. Denies fever, chills, blood aches, increase pain or swelling, drainage, or decreased perfusion.    Hypertriglyceridemia  States that he has been trying to improve his diet since last visit on 1/17/2020. States that he has not been able to  Fenofibrate or CoQ10 from pharmacy. States that he has started Omega 3 fish oil and a fiber pill daily. Denies any abdomen pain at this time.      Diabetes mellitus (HCC)  States since last visit on 1/17/2020 he has been trying to change his diet to limit carbohydrates. States that he continues to drink nightly, but has changed instead of drinking with tonic water, he is drinking club soda. States that he is not drinking soda, which is a decrease. States that he has increased his water intake. States that him and his girlfriend have just signed a lease on an appointment and he will be able cook more at home. He recently has been living in an weekly hotel room, with minimum ability to cook at home. States that he had joined a gym and will continue to increase physical exercise as tolerated due to current laceration. States that he has been taking 500 mg Metformin BID without GI upset. States that he feels comfortable with checking his blood fasting blood glucose, postprandial glucose at least 4 times a week each, and giving self injection of  dulaglutide after education and demonstration with Darline Richardson RN in clinic today. States he has not been contacted by the HIP as far as he knows.    History reviewed. No pertinent past medical history.    Social History     Tobacco Use   • Smoking status: Former Smoker     Packs/day: 0.00     Types: Cigarettes     Last attempt to quit: 2017     Years since quittin.1   • Smokeless tobacco: Former User   • Tobacco comment: States that he quit in 2017, previous he would smoke when he was stressed.   Substance Use Topics   • Alcohol use: Yes     Alcohol/week: 1.8 oz     Types: 3 Standard drinks or equivalent per week     Frequency: 4 or more times a week     Drinks per session: 3 or 4     Binge frequency: Less than monthly   • Drug use: Yes     Types: Marijuana, Inhaled     Comment: occasional     Current Outpatient Medications Ordered in Epic   Medication Sig Dispense Refill   • Multiple Vitamins-Minerals (MULTIVITAMIN ADULT PO) Take  by mouth.     • Calcium Polycarbophil (FIBER-LAX PO) Take  by mouth.     • MELATONIN PO Take  by mouth.     • Omega-3 Fatty Acids (FISH OIL) 1000 MG Cap capsule Take 1,000 mg by mouth 3 times a day, with meals.     • Probiotic Product (PROBIOTIC DAILY PO) Take  by mouth.     • metFORMIN (GLUCOPHAGE) 500 MG Tab Take 1 Tab by mouth 2 times a day, with meals. 90 Tab 0   • Dulaglutide 0.75 MG/0.5ML Solution Pen-injector Inject 0.75 mg as instructed every 7 days. 4 PEN 1   • IBUPROFEN PO Take  by mouth.     • ACCU-CHEK BERENICE PLUS strip      • Blood Glucose Monitoring Suppl (ACCU-CHEK BERENICE PLUS) w/Device Kit      • Lancets Use one Accucheck Berenice lancet to test blood sugar twice daily. 100 Each 0   • Alcohol Swabs Wipe site with prep pad prior to injection. 100 Each 0   • Fenofibrate 40 MG Tab Take 1 Tab by mouth every day. (Patient not taking: Reported on 2020) 30 Tab 1     No current Epic-ordered facility-administered medications on file.   "    Allergies:  Codeine    ROS:  Constitutional: Denies fever, chills, night sweats, weight loss/gain  or malaise/fatigue.   HENT: Denies nasal congestion, sore throat, hearing loss, enlarged thyroid, or difficulty swallowing.    Eyes: Denies changes in vision, pain. Wears corrective wear.   Respiratory: Denies cough, SOB at rest or activity.    Cardiovascular: Denies tachycardia, chest pain, palpitations, or  leg swelling.   Gastrointestinal: Denies N/V/C/D, abdominal pain, loss appetite, reflux, or hematochezia.  Genitourinary: Denies difficulty voiding, dysuria, nocturia, or hematuria.   Skin: Negative for rash or worrisome moles. Healing laceration, see HPI.  Neurological: Negative for dizziness, focal weakness and headaches.   Endo/Heme/Allergies: Denies bruise/bleed easily, allergies.   Psychiatric/Behavioral: Denies depression, nervous/anxious, difficulty sleeping.  MSK: History of ongoing lower back pain and neck pain.  Objective:   Exam:  /82   Pulse 88   Temp 36.7 °C (98 °F)   Resp 12   Ht 1.727 m (5' 8\")   Wt 93.9 kg (207 lb)   SpO2 95%   BMI 31.47 kg/m²  Body mass index is 31.47 kg/m².  Constitutional: Alert, no distress, well-groomed.  Skin: Warm, dry, good turgor, no rashes in visible areas. Well perfused above and below laceration. 15 cm healing laceration with  well approximated. Improved erythema at suture line, no redness around site of injury, or streaking from site. Erythema at site blanchable. No drainage, bleeding, or added warmth at time. No bruising noted.  Eye: Equal, round and reactive, conjunctiva clear, lids normal.  ENMT: Lips without lesions, good dentition, moist mucous membranes.  Neck: Trachea midline, no masses, no thyromegaly.  Respiratory: Unlabored respiratory effort, no cough.  MSK: Normal gait, moves all extremities. Improved modified gait no use of assistive devices. No extremity cyanosis or clubbing. Left lower anterior shin with improving mild edema, " non-pitting. Lower extremity muscle strength 5/5, DTR +2.  Neuro: Grossly non-focal.   Psych: Alert and oriented x3, normal affect and mood.      Assessment & Plan:   1. Encounter for long-term current use of medication  Reviewed with patient medication use and side effects. Medical, past, surgical history reviewed with patient.     2. Type 2 diabetes mellitus with other specified complication, without long-term current use of insulin (HCC)  This is a stable condition. Discussed with patient and girlfriend extensive plan of care to lower A1c to below 7%, verbalized understanding. Discussed an accumulation of 150 minutes or more of moderate-intensity activity each week as tolerated. Discussed a healthy diet that is low in fat, sodium, and cholesterol, such as the mediterranean diet that is typically high in fruits, vegetables, whole grains, beans, nuts, and seeds, includes olive oil as an important source of monounsaturated fat, DASH diet, and/or also consider a plant-based diet.  Encouraged to explore diabetic Association website for further nutritional counseling.  Discussed with patient also following through with HIP referral for further diabetic education and nutritional counseling, verbalized understanding.  Continue to encouraged patient to stop drinking or lower intake. Discussed if not willing to stop drinking, continue to use club soda with mixed drinks, instructed to not to drink beer.  Discussed with patient setting a goal to decrease frequency, amount of strength, or having 1 day a week without drinking, verbalized understanding and stated he will continue to work on not drinking. Instructed to continue Metformin 500 mg twice daily with meals now.  Will reevaluate side effects at next appointment and possibly increase metformin at that time. Discussed plan of care will be to maximize dosage of 2000 mg daily, if tolerated. Instructed to give SQ injection of Dulaglutide  every 7 days.  Reviewed with  patient next injection will be due next Friday, /31/2020, verbalized understanding.  Discussed target fasting glucose of 120-135, postprandial glucose less than 180.  Patient instructed to check postprandial glucose 1 to 2 hours after eating, verbalized understanding.  Handout given to patient how to check glucose, fasting glucose and postprandial glucose.  Reviewed with patient at length signs and symptoms of hypoglycemia or hyperglycemia.  Discussed with patient ways to manage hyper and hypoglycemia symptoms, verbalized understanding.  Discussed on-going need to assess kidney function, assess for comorbidity of hypothyroidism, retinal eye exam, and regular teeth cleaning, verbalized understanding.  Will obtain CM, microalbumin creatinine ratio urine, and TSH once patient has taken fenofibrate for 14 days.  Instructed to RTC in 7 days for follow-up.     3. Hypertriglyceridemia  This is a stable condition.  Patient encouraged to continue working on decreasing alcohol consumption, discussed current recommendation is to not drink at all.  Instructed to continue taking omega-3 fish oil.  Pharmacy called during appointment and fenofibrate is ready to be picked up at local [facility], which is patient's preferred pharmacy.  Patient made aware of this and stated he will  prescription on the way home.  Reviewed possible symptoms with patient of muscle cramping and/or pain.  Instructed to take co-Q10 daily to decrease possible side effects.  Discussed with patient checking CMP once he has started taking medication for 14 days due to possible elevated liver enzymes.  Discussed with patient that the plan of care will be every 4 weeks increasing fenofibrate as tolerated to a maximum of 120 mg.  Discussed with patient that if he tolerates fenofibrate possible future plan of care is adding a statin or replacing the fenofibrate once triglycerides are lowered with a statin to continue to manage hypercholesterolemia.  Discussed as  mentioned above that an increased exercise and tighter diet including decreased alcohol intake will assess and decreasing his overall cholesterol panel, verbalized understanding.    4. Laceration of skin of left lower leg, subsequent encounter  This is a stable healing condition.  Instructed to continue to not scrub site and shower to just let soapy water flow or laceration discussed continuing lower extremity exercises to start strengthening involved muscles from injury.  Encourage patient to add onto current exercises.  We will start physical therapy 2/3/2020 due to current work schedule.  Discussed with patient will continue to evaluate the healing process at every schedule appointment.  Encouraged to reach out to provider if wound healing changes.  Reviewed signs and symptoms of infection, verbalized understanding.    Nahomi Richardson RN spent 45 minutes with patient demonstrated how to use glucometer and to give self injection.     Patient was seen for 30 minutes face to face of which, at least 50% of the time was spent counseling regarding the above.    Return in about 1 week (around 1/31/2020).    Please note that this dictation was created using voice recognition software. I have made every reasonable attempt to correct obvious errors, but I expect that there are errors of grammar and possibly content that I did not discover before finalizing the note.

## 2020-01-26 NOTE — ASSESSMENT & PLAN NOTE
States that he has been trying to improve his diet since last visit on 1/17/2020. States that he has not been able to  Fenofibrate or CoQ10 from pharmacy. States that he has started Omega 3 fish oil and a fiber pill daily. Denies any abdomen pain at this time.

## 2020-01-27 ENCOUNTER — TELEPHONE (OUTPATIENT)
Dept: MEDICAL GROUP | Facility: IMAGING CENTER | Age: 45
End: 2020-01-27

## 2020-01-27 DIAGNOSIS — E11.69 TYPE 2 DIABETES MELLITUS WITH OTHER SPECIFIED COMPLICATION, WITHOUT LONG-TERM CURRENT USE OF INSULIN (HCC): ICD-10-CM

## 2020-01-27 NOTE — TELEPHONE ENCOUNTER
Can we please call the [facility], and ask them if they can contact Diogo. I saw him on Friday 1/24/2020, and he said he hadn't heard from them. I placed a referral on 1/17/2020. He might need to have one on one education due to his work schedule. We have done education with him about how to use his glucometer and giving himself his injection, but he needs nutrition education and further diabetes education..  Tiff Young, APRN-C

## 2020-01-27 NOTE — PROGRESS NOTES
New onset of Type 2 diabetes. Repeat CMP after 14 days of Fenobriate to evaluate for elevated liver enzymes.  Tiff Young, APRN-C

## 2020-01-27 NOTE — TELEPHONE ENCOUNTER
Spoke with HIP, they will call the patient to schedule a one on one visit for diabetic education.

## 2020-02-03 ENCOUNTER — PHYSICAL THERAPY (OUTPATIENT)
Dept: PHYSICAL THERAPY | Facility: REHABILITATION | Age: 45
End: 2020-02-03
Attending: NURSE PRACTITIONER
Payer: COMMERCIAL

## 2020-02-03 DIAGNOSIS — S81.812D: ICD-10-CM

## 2020-02-03 DIAGNOSIS — M54.2 NECK PAIN: ICD-10-CM

## 2020-02-03 PROCEDURE — 97161 PT EVAL LOW COMPLEX 20 MIN: CPT

## 2020-02-03 PROCEDURE — 97110 THERAPEUTIC EXERCISES: CPT

## 2020-02-03 ASSESSMENT — ENCOUNTER SYMPTOMS: PAIN SCALE: 0
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
## 2020-02-05 ENCOUNTER — PHYSICAL THERAPY (OUTPATIENT)
Dept: PHYSICAL THERAPY | Facility: REHABILITATION | Age: 45
End: 2020-02-05
Attending: NURSE PRACTITIONER
Payer: COMMERCIAL

## 2020-02-05 DIAGNOSIS — M54.2 NECK PAIN: ICD-10-CM

## 2020-02-05 DIAGNOSIS — S81.812D: ICD-10-CM

## 2020-02-05 PROCEDURE — 97110 THERAPEUTIC EXERCISES: CPT

## 2020-02-06 NOTE — OP THERAPY DAILY TREATMENT
Outpatient Physical Therapy  DAILY TREATMENT     [facility] [place]  [address], [address]  [place] [address]  Phone:  [phone]  Fax:  [phone]    Date: 02/05/2020    Patient: Diogo Keen  YOB: 1975  MRN: [id number]     Time Calculation  Start time: 1530  Stop time: 1600 Time Calculation (min): 30 minutes       Chief Complaint:   Visit #: 2    SUBJECTIVE:  Pt reports that he has been doing the band exercise. Note no pain in neck or back currently. States he will probably hurt after moving furniture this weekend.     OBJECTIVE:  Current objective measures: tightness reported with end range passive plantarflexion in anterior shin just proximal to ankle. Otherwise WNL neck, back, leg          Therapeutic Exercises (CPT 48328):     1. Nustep, lvl 5 x 5min    2. Single leg squat shuttle, 4c x 25    3. Jumping and hopping , 2c x35    4. BAPS, lvl 4 x 30 rotations    5. Ankle roll in standing, x 20    6. Ankle rocker on 1/2 foam, x1min    7. Supine TRA introduction and lvl 1 marhcing , x 5min      Time-based treatments/modalities:  Therapeutic exercise minutes (CPT 66143): 30 minutes       Pain rating before treatment: 0  Pain rating after treatment: 0    ASSESSMENT:   Response to treatment: no symptoms other than fatigue/tightness reported during exercises. Core strengthening initiated without issue. Pt to f//u next week for progressions.     PLAN/RECOMMENDATIONS:   Plan for treatment: therapy treatment to continue next visit.  Planned interventions for next visit: continue with current treatment.

## 2020-02-07 ENCOUNTER — OFFICE VISIT (OUTPATIENT)
Dept: MEDICAL GROUP | Facility: IMAGING CENTER | Age: 45
End: 2020-02-07
Payer: COMMERCIAL

## 2020-02-07 VITALS
BODY MASS INDEX: 31.22 KG/M2 | OXYGEN SATURATION: 95 % | DIASTOLIC BLOOD PRESSURE: 92 MMHG | WEIGHT: 206 LBS | HEART RATE: 95 BPM | TEMPERATURE: 98.2 F | RESPIRATION RATE: 13 BRPM | SYSTOLIC BLOOD PRESSURE: 128 MMHG | HEIGHT: 68 IN

## 2020-02-07 DIAGNOSIS — E11.69 TYPE 2 DIABETES MELLITUS WITH OTHER SPECIFIED COMPLICATION, WITHOUT LONG-TERM CURRENT USE OF INSULIN (HCC): Primary | ICD-10-CM

## 2020-02-07 DIAGNOSIS — S81.812D LACERATION OF LEFT LOWER EXTREMITY, SUBSEQUENT ENCOUNTER: ICD-10-CM

## 2020-02-07 DIAGNOSIS — Z79.899 ENCOUNTER FOR LONG-TERM CURRENT USE OF MEDICATION: ICD-10-CM

## 2020-02-07 DIAGNOSIS — E78.1 HYPERTRIGLYCERIDEMIA: ICD-10-CM

## 2020-02-07 DIAGNOSIS — Z30.09 ENCOUNTER FOR VASECTOMY COUNSELING: ICD-10-CM

## 2020-02-07 DIAGNOSIS — D22.9 NUMEROUS SKIN MOLES: ICD-10-CM

## 2020-02-07 PROCEDURE — 99214 OFFICE O/P EST MOD 30 MIN: CPT | Performed by: NURSE PRACTITIONER

## 2020-02-07 RX ORDER — LANCETS 30 GAUGE
EACH MISCELLANEOUS
Qty: 100 EACH | Refills: 3 | Status: SHIPPED | OUTPATIENT
Start: 2020-02-07 | End: 2020-06-28 | Stop reason: SDUPTHER

## 2020-02-07 ASSESSMENT — PAIN SCALES - GENERAL: PAINLEVEL: 1=MINIMAL PAIN

## 2020-02-08 NOTE — PROGRESS NOTES
Subjective:   CC: Diabetes Follow-up    HPI:   Diogo presents today for follow up on diabetes management.    States that he has started physical therapy for his lower left leg and lower back. States that his left leg laceration has been healing well. Denies any increase pain at site, drainage, warmth, or edema. States that with physical therapy exercises his left lower anterior shin and dorsal portion of his foot has been sore, but is relieved with rest. States intermittently at the end of the day after standing all day his left lower anterior shin and dorsal portion of his foot has been sore.    States that he has multiple moles and skin tags on his body that he would like to have checked by dermatology and possibly removed.    States that he is interested in a vasectomy. States he would like a referral for urology to discuss procedure.      Diabetes mellitus (HCC)  States that he is current taking Metformin 500 mg BID without GI upset. States that he giving himself injections of his Trulicity once a week. States that he is giving his injection on Fridays. States that he forgot last Friday and remembered on Wednesday 2/5/2020. Denies any complication at injection site. States that his lancet pen broke on 1/28/2020, has been unable to check glucose. Reports when checking FBS: 175-209. Denies any symptoms of hypoglycemia or hyperglycemia. States that he hasn't heard or contacted the HIP. States that he is trying to change his diet, attempting to decrease carbohydrate intake. States that he is continuing to drink alcohol nightly, but has decreased to 2 drinks a night, and is making his mixed drinks with club soda or diet tonic. States that he currently moving into an apartment that has a kitchen that he will be able to meal prep once settled in.  Cage Score: 0      Hypertriglyceridemia  States that he currently taking Fenofibrate 40 mg, Omega 3 Fatty Acids 1000 mg, and Calcium Polycarbophil daily. Denies any symptoms  related to medication and supplements. States that he has not established a regular exercise regimen due to current lower left leg injury. States that he is actively participating in physical therapy and this is his only form of exercise    History reviewed. No pertinent past medical history.    Social History     Tobacco Use   • Smoking status: Former Smoker     Packs/day: 0.00     Types: Cigarettes     Last attempt to quit: 2017     Years since quittin.1   • Smokeless tobacco: Former User   • Tobacco comment: States that he quit in 2017, previous he would smoke when he was stressed.   Substance Use Topics   • Alcohol use: Yes     Alcohol/week: 1.8 oz     Types: 3 Standard drinks or equivalent per week     Frequency: 4 or more times a week     Drinks per session: 3 or 4     Binge frequency: Less than monthly   • Drug use: Yes     Types: Marijuana, Inhaled     Comment: occasional     Current Outpatient Medications Ordered in Epic   Medication Sig Dispense Refill   • Lancets Lancets order: Lancets for Accucheck Laura meter. Sig: use in am for FBS and prn ssx high or low sugar. #100 RF x 3 100 Each 3   • Dulaglutide 0.75 MG/0.5ML Solution Pen-injector Inject 0.75 mg as instructed every 7 days. 4 PEN 1   • ACCU-CHEK LAURA PLUS strip      • Blood Glucose Monitoring Suppl (ACCU-CHEK LAURA PLUS) w/Device Kit      • Multiple Vitamins-Minerals (MULTIVITAMIN ADULT PO) Take  by mouth.     • Calcium Polycarbophil (FIBER-LAX PO) Take  by mouth.     • MELATONIN PO Take  by mouth.     • Omega-3 Fatty Acids (FISH OIL) 1000 MG Cap capsule Take 1,000 mg by mouth 3 times a day, with meals.     • Probiotic Product (PROBIOTIC DAILY PO) Take  by mouth.     • metFORMIN (GLUCOPHAGE) 500 MG Tab Take 1 Tab by mouth 2 times a day, with meals. 90 Tab 0   • Alcohol Swabs Wipe site with prep pad prior to injection. 100 Each 0   • Fenofibrate 40 MG Tab Take 1 Tab by mouth every day. 30 Tab 1   • IBUPROFEN PO Take  by mouth.       No  "current [facility]-ordered facility-administered medications on file.      Allergies:  Codeine    Health Maintenance: Completed.     ROS:  Constitutional: Denies fever, chills, night sweats, weight loss/gain  or malaise/fatigue.   HENT: Denies nasal congestion, sore throat, hearing loss, enlarged thyroid, or difficulty swallowing.    Eyes: Denies changes in vision, pain. Wears corrective wear.   Respiratory: Denies cough, SOB at rest or activity.    Cardiovascular: Denies tachycardia, chest pain, palpitations, or  leg swelling.   Gastrointestinal: Denies N/V/C/D, abdominal pain, loss appetite, reflux, or hematochezia.  Genitourinary: Denies difficulty voiding, dysuria, nocturia, or hematuria.   Skin: Negative for rash or worrisome moles. Healing laceration, see HPI.  Neurological: Negative for dizziness, focal weakness and headaches.   Endo/Heme/Allergies: Denies bruise/bleed easily, allergies.   Psychiatric/Behavioral: Denies depression, nervous/anxious, difficulty sleeping.  MSK: History of ongoing lower back pain and neck pain.    Objective:   Exam:  /92 (BP Location: Right arm, Patient Position: Sitting, BP Cuff Size: Adult)   Pulse 95   Temp 36.8 °C (98.2 °F) (Temporal)   Resp 13   Ht 1.727 m (5' 8\")   Wt 93.4 kg (206 lb)   SpO2 95%   BMI 31.32 kg/m²  Body mass index is 31.32 kg/m².  General: Normal appearing. No distress.  Neck: Supple without JVD or abnormal masses. Small soft mobile thyroid palpated, no nodules palpated, non-tender.  Pulmonary: Clear to ausculation.  Normal effort. No rales, ronchi, or wheezing.  Cardiovascular: Regular rate and rhythm without murmur. Pedal and radial pulses are intact and equal bilaterally.  Abdomen: Soft, nontender, nondistended. Normal bowel sounds. Liver and spleen are not palpable  Lymph: No cervical or supraclavicular lymph nodes are palpable  Skin: Warm and dry.  No obvious lesions. Well perfused above and below laceration. 15 cm healing laceration with well " approximated. Minimal erythema at suture line, no redness around site of injury, or streaking from site. Erythema at site blanchable. No drainage, bleeding, or added warmth at time. No bruising or edema noted. At proximal edge of wound near knee, 3 internal sutures noted at skin level were about 1 cm of healing laceration has opened slightly during healing process, no complication at area. Monofilament testing with a 10 gram force: sensation intact: intact bilaterally  Visual Inspection: Feet without maceration, ulcers, fissures.  Pedal pulses: intact bilaterally    Musculoskeletal: Normal gait. No extremity cyanosis, clubbing, or edema.  Psych: Normal mood and affect. Alert and oriented x3. Judgment and insight is normal.    Laceration care: Sutures trimmed to at or below skin level. Alcohol used before and after trim, antibiotic ointment and band-aid applied. Instructed to remove band-aid in am.  Assessment & Plan:   1. Encounter for long-term current use of medication  Reviewed with patient medication use and side effects. Medical, past, surgical history reviewed with patient.     2. Type 2 diabetes mellitus with other specified complication, without long-term current use of insulin (HCC)  This is a stable condition. Reviewed with patient plan of care to lower A1c to below 7%, verbalized understanding. Discussed an accumulation of 150 minutes or more of moderate-intensity activity each week as tolerated. Discussed a healthy diet that is low in fat, sodium, and cholesterol, such as the mediterranean diet that is typically high in fruits, vegetables, whole grains, beans, nuts, and seeds, includes olive oil as an important source of monounsaturated fat, DASH diet, and/or also consider a plant-based diet.  Encouraged to explore diabetic Association website for further nutritional counseling.  Discussed with patient also following through with HIP referral for further diabetic education and nutritional counseling,  verbalized understanding. Discussed with patient continue to the goal of 150 carbohydrates or less in 24-hour time.  Discussed with patient this is including his soft drinks. Encouraged patient to soft drinking or lower intake. Discussed if not willing to stop drinking, continue to use club soda with mixed drinks, instructed to not to drink beer.  Discussed with patient setting a goal to decrease frequency, amount of strength, or having 1 day a week without drinking, verbalized understanding and stated he will continue to work on not drinking. Instructed to continue Metformin 1000 mg in am and 500 mg in pm with meals now.  Will reevaluate side effects at next appointment and continue to increase metformin to maximize dosage of 2000 mg daily, if tolerated. Instructed to give SQ injection of Dulaglutide every 7 days.  Discussed with patient when he forgets to give his Dulaglutide he will need to slowly bridge back to schedule day of Friday.  Directed to not give medication today, due to recently giving himself injection on Wednesday.  Discussed with patient and giving self injection on Monday, 2/10/2020.  Reviewed with patient the week after he will give himself injection on Sunday, 2/16/2020, and continue in this fashion until he can resume Friday injections, verbalized understanding.  Discussed target fasting glucose of 120-135, postprandial glucose less than 180.  Patient instructed to check postprandial glucose 1 to 2 hours after eating, verbalized understanding.  Reviewed with patient at length signs and symptoms of hypoglycemia or hyperglycemia.  Discussed with patient ways to manage hyper and hypoglycemia symptoms, verbalized understanding.  Discussed on-going need to assess kidney function, assess for comorbidity of hypothyroidism, retinal eye exam, and regular teeth cleaning, verbalized understanding.  Instructed to obtain CMP, microalbumin creatinine ratio urine, and TSH since he has taken fenofibrate for 14  days.  Instructed to RTC in 3 weeks for follow-up.    -     Lancets; Lancets order: Lancets for Accucheck Laura meter. Sig: use in am for FBS and prn ssx high or low sugar. #100 RF x 3        -     Dulaglutide 0.75 MG/0.5ML Solution Pen-injector; Inject 0.75 mg as instructed every 7 days.    3. Hypertriglyceridemia  This is a stable condition.  Patient instructed to continue taking 40 mg of fenofibrate daily, co-Q10, and omega-3 fish oil.  Discussed with patient once obtaining labs and evaluation of liver enzymes, the plan of care will be to increase his dose of fenofibrate as tolerated to 54 mg daily.  We will reevaluate liver enzymes in 4 weeks to assess if we are able to continue to increase fenofibrate to a maximum of 120 mg daily.  Verbalized understanding.  Possible side effects reviewed with patient, verbalized understanding.  Discussed with patient the role of his alcohol intake and continued elevated triglyceride levels, verbalized understanding.    4. Numerous skin moles  This is a stable condition.  Discussed with patient referral to dermatology per request.  -     REFERRAL TO DERMATOLOGY    5. Encounter for vasectomy counseling  Discussed with patient referral to urology for further vasectomy counseling.  Discussed with patient that this is a permanent form of birth control, verbalized understanding.    -     REFERRAL TO UROLOGY    6. Laceration of left lower extremity, subsequent encounter  This is a chronic stable condition.  Discussed with patient continuing physical therapy to strengthen muscles associated with laceration.  Verbalized understanding.  Discussed with patient returning to clinic sooner than 3 weeks if he has noticed infection, edema, warmth, or increased pain at laceration site.  Discussed signs and symptoms of infection with patient, verbalized understanding.    Return in about 3 weeks (around 2/28/2020).    Please note that this dictation was created using voice recognition software.  I have made every reasonable attempt to correct obvious errors, but I expect that there are errors of grammar and possibly content that I did not discover before finalizing the note.

## 2020-02-10 ENCOUNTER — PHYSICAL THERAPY (OUTPATIENT)
Dept: PHYSICAL THERAPY | Facility: REHABILITATION | Age: 45
End: 2020-02-10
Attending: NURSE PRACTITIONER
Payer: COMMERCIAL

## 2020-02-10 DIAGNOSIS — S81.812D: ICD-10-CM

## 2020-02-10 DIAGNOSIS — M54.2 NECK PAIN: ICD-10-CM

## 2020-02-10 PROCEDURE — 97110 THERAPEUTIC EXERCISES: CPT

## 2020-02-10 NOTE — ASSESSMENT & PLAN NOTE
States that he is current taking Metformin 500 mg BID without GI upset. States that he giving himself injections of his Trulicity once a week. States that he is giving his injection on Fridays. States that he forgot last Friday and remembered on Wednesday 2/5/2020. Denies any complication at injection site. States that his lancet pen broke on 1/28/2020, has been unable to check glucose. Reports when checking FBS: 175-209. Denies any symptoms of hypoglycemia or hyperglycemia. States that he hasn't heard or contacted the HIP. States that he is trying to change his diet, attempting to decrease carbohydrate intake. States that he is continuing to drink alcohol nightly, but has decreased to 2 drinks a night, and is making his mixed drinks with club soda or diet tonic. States that he currently moving into an apartment that has a kitchen that he will be able to meal prep once settled in.  Cage Score: 0

## 2020-02-10 NOTE — ASSESSMENT & PLAN NOTE
States that he currently taking Fenofibrate 40 mg, Omega 3 Fatty Acids 1000 mg, and Calcium Polycarbophil daily. Denies any symptoms related to medication and supplements. States that he has not established a regular exercise regimen due to current lower left leg injury. States that he is actively participating in physical therapy and this is his only form of exercise

## 2020-02-11 NOTE — OP THERAPY DAILY TREATMENT
Outpatient Physical Therapy  DAILY TREATMENT     [facility] [place]  [address], [address]  [place] [address]  Phone:  [phone]  Fax:  [phone]    Date: 02/10/2020    Patient: Diogo Keen  YOB: 1975  MRN: [id number]     Time Calculation  Start time: 0336  Stop time: 0400 Time Calculation (min): 24 minutes       Chief Complaint:   Visit #: 3    SUBJECTIVE:  Pt states he is stiff after moving furniture this weekend but not in any more pain. States he is just tired.     OBJECTIVE:  Current objective measures: tightness reported with end range passive plantarflexion in anterior shin just proximal to ankle. Otherwise WNL neck, back, leg ROM.           Therapeutic Exercises (CPT 42002):     1. Bertha pose, 30 sec x 4    2. Single leg squat shuttle, 4c x 25    3. Jumping and hopping , 2c x35    4. Paloff press, double green x 10 sec x 6 each side    5. Ankle roll in standing, x 20    6. Ankle rocker on 1/2 foam, x1min    7. Supine marching, lvl 2 x 20, lvl 3 20    8. Quadruped cat camel, x 20, with kick backs +TRA x 20 each leg      Time-based treatments/modalities:  Therapeutic exercise minutes (CPT 39798): 24 minutes       Pain rating before treatment: 0  Pain rating after treatment: 0    ASSESSMENT:   Response to treatment: no symptoms other than fatigue/tightness reported during exercises. Core strengthening initiated without issue. Pt to f/u next week for progressions. Likely d/c within 2 visits as symptoms are controlled and pt can continue HEP on own for strengthening.     PLAN/RECOMMENDATIONS:   Plan for treatment: therapy treatment to continue next visit.  Planned interventions for next visit: continue with current treatment.

## 2020-02-12 ENCOUNTER — PHYSICAL THERAPY (OUTPATIENT)
Dept: PHYSICAL THERAPY | Facility: REHABILITATION | Age: 45
End: 2020-02-12
Attending: NURSE PRACTITIONER
Payer: COMMERCIAL

## 2020-02-12 DIAGNOSIS — S81.812D: ICD-10-CM

## 2020-02-12 DIAGNOSIS — M54.2 NECK PAIN: ICD-10-CM

## 2020-02-12 PROCEDURE — 97110 THERAPEUTIC EXERCISES: CPT

## 2020-02-12 NOTE — OP THERAPY DAILY TREATMENT
Outpatient Physical Therapy  DAILY TREATMENT     [facility] Outpatient Physical Therapy Only  [address], [address]  [place] [address]  Phone:  [phone]  Fax:  [phone]    Date: 02/12/2020    Patient: Diogo Keen  YOB: 1975  MRN: [id number]     Time Calculation  Start time: 0250  Stop time: 0330 Time Calculation (min): 40 minutes       Chief Complaint:   Visit #: 4    SUBJECTIVE:  NO increased pain reported. Is hopeful that he will be able to snowboard this weekend if time allows.      OBJECTIVE:  Current objective measures: tightness reported with end range passive plantarflexion in anterior shin just proximal to ankle. Otherwise WNL neck, back, leg ROM.           Therapeutic Exercises (CPT 11271):     1. Bertha pose, 30 sec x 4    2. Single leg squat shuttle, 4c x 25    3. Jumping and hopping , 2c x35    4. Prone ball supermans, 10 sec x 10    5. Ankle roll in standing, x 20    6. Ankle rocker on 1/2 foam, x1min    7. Supine marching, lvl 2 x 20, lvl 3 20    8. Quadruped cat camel, x 20, with kick backs +TRA x 20 each leg      Time-based treatments/modalities:  Therapeutic exercise minutes (CPT 66040): 40 minutes       Pain rating before treatment: 0  Pain rating after treatment: 0    ASSESSMENT:   Response to treatment: no symptoms other than fatigue/tightness reported during exercises. Education on back/core progression to occur next visit with likely d/c to occur. as symptoms are controlled and pt can continue HEP on own for strengthening.     PLAN/RECOMMENDATIONS:   Plan for treatment: therapy treatment to continue next visit.  Planned interventions for next visit: continue with current treatment.

## 2020-02-13 ENCOUNTER — HOSPITAL ENCOUNTER (OUTPATIENT)
Dept: LAB | Facility: MEDICAL CENTER | Age: 45
End: 2020-02-13
Attending: NURSE PRACTITIONER
Payer: COMMERCIAL

## 2020-02-13 DIAGNOSIS — E11.69 TYPE 2 DIABETES MELLITUS WITH OTHER SPECIFIED COMPLICATION, WITHOUT LONG-TERM CURRENT USE OF INSULIN (HCC): ICD-10-CM

## 2020-02-13 LAB
ALBUMIN SERPL BCP-MCNC: 5.1 G/DL (ref 3.2–4.9)
ALBUMIN/GLOB SERPL: 1.8 G/DL
ALP SERPL-CCNC: 66 U/L (ref 30–99)
ALT SERPL-CCNC: 45 U/L (ref 2–50)
ANION GAP SERPL CALC-SCNC: 9 MMOL/L (ref 0–11.9)
AST SERPL-CCNC: 26 U/L (ref 12–45)
BILIRUB SERPL-MCNC: 0.8 MG/DL (ref 0.1–1.5)
BUN SERPL-MCNC: 14 MG/DL (ref 8–22)
CALCIUM SERPL-MCNC: 10.3 MG/DL (ref 8.5–10.5)
CHLORIDE SERPL-SCNC: 105 MMOL/L (ref 96–112)
CO2 SERPL-SCNC: 23 MMOL/L (ref 20–33)
CREAT SERPL-MCNC: 1.17 MG/DL (ref 0.5–1.4)
CREAT UR-MCNC: 96.3 MG/DL
GLOBULIN SER CALC-MCNC: 2.9 G/DL (ref 1.9–3.5)
GLUCOSE SERPL-MCNC: 119 MG/DL (ref 65–99)
MICROALBUMIN UR-MCNC: 1.1 MG/DL
MICROALBUMIN/CREAT UR: 11 MG/G (ref 0–30)
POTASSIUM SERPL-SCNC: 4.1 MMOL/L (ref 3.6–5.5)
PROT SERPL-MCNC: 8 G/DL (ref 6–8.2)
SODIUM SERPL-SCNC: 137 MMOL/L (ref 135–145)
TSH SERPL DL<=0.005 MIU/L-ACNC: 1.82 UIU/ML (ref 0.38–5.33)

## 2020-02-13 PROCEDURE — 36415 COLL VENOUS BLD VENIPUNCTURE: CPT

## 2020-02-13 PROCEDURE — 84443 ASSAY THYROID STIM HORMONE: CPT

## 2020-02-13 PROCEDURE — 82570 ASSAY OF URINE CREATININE: CPT

## 2020-02-13 PROCEDURE — 80053 COMPREHEN METABOLIC PANEL: CPT

## 2020-02-13 PROCEDURE — 82043 UR ALBUMIN QUANTITATIVE: CPT

## 2020-02-14 DIAGNOSIS — E78.1 HYPERTRIGLYCERIDEMIA: ICD-10-CM

## 2020-02-14 RX ORDER — FENOFIBRATE 54 MG/1
54 TABLET ORAL DAILY
Qty: 180 TAB | Refills: 0 | Status: SHIPPED | OUTPATIENT
Start: 2020-02-14 | End: 2020-06-28 | Stop reason: SDUPTHER

## 2020-02-14 NOTE — PROGRESS NOTES
Lab Results   Component Value Date/Time    CHOLSTRLTOT 284 (H) 01/11/2020 11:45 AM    LDL see below 01/11/2020 11:45 AM    HDL see below 01/11/2020 11:45 AM    TRIGLYCERIDE 1382 (H) 01/11/2020 11:45 AM       Lab Results   Component Value Date/Time    SODIUM 137 02/13/2020 04:02 PM    POTASSIUM 4.1 02/13/2020 04:02 PM    CHLORIDE 105 02/13/2020 04:02 PM    CO2 23 02/13/2020 04:02 PM    GLUCOSE 119 (H) 02/13/2020 04:02 PM    BUN 14 02/13/2020 04:02 PM    CREATININE 1.17 02/13/2020 04:02 PM     Lab Results   Component Value Date/Time    ALKPHOSPHAT 66 02/13/2020 04:02 PM    ASTSGOT 26 02/13/2020 04:02 PM    ALTSGPT 45 02/13/2020 04:02 PM    TBILIRUBIN 0.8 02/13/2020 04:02 PM      Increased dose from 40 mg to 54 mg of Fenobriate daily. Will reevaluate CMP in weeks to potentially increase dose again to a max of 120 mg.  PABLO Gee-C

## 2020-02-19 ENCOUNTER — APPOINTMENT (OUTPATIENT)
Dept: PHYSICAL THERAPY | Facility: REHABILITATION | Age: 45
End: 2020-02-19
Attending: NURSE PRACTITIONER
Payer: COMMERCIAL

## 2020-02-24 DIAGNOSIS — R73.09 ELEVATED HEMOGLOBIN A1C: ICD-10-CM

## 2020-02-24 DIAGNOSIS — E11.69 TYPE 2 DIABETES MELLITUS WITH OTHER SPECIFIED COMPLICATION, WITHOUT LONG-TERM CURRENT USE OF INSULIN (HCC): ICD-10-CM

## 2020-02-24 NOTE — TELEPHONE ENCOUNTER
Please call patient and ask him how he is tolerating 1000 mg in the am and 500 mg in pm of Metformin. I would like to increase to 1000 mg in am and 1000 mg in pm before refill of medication.   Tiff Young, PABLO-C

## 2020-02-24 NOTE — TELEPHONE ENCOUNTER
Spoke with pharmacy. They confirmed they have received the fenofibrate and it is filled and waiting for pt to . Sent rx refill request to [place] for metformin /kb

## 2020-02-24 NOTE — TELEPHONE ENCOUNTER
----- Message from Sonia Knox sent at 2/24/2020 10:30 AM PST -----  Regarding: RX REFILLS  Pt called stating that he urgently needs his prescriptions refilled. He will be running out tomorrow. Metformin and fenofibrate. Please send to the [facility] on [address] in [place].    Thank you

## 2020-02-25 ENCOUNTER — SUPERVISING PHYSICIAN REVIEW (OUTPATIENT)
Dept: MEDICAL GROUP | Facility: IMAGING CENTER | Age: 45
End: 2020-02-25

## 2020-02-25 NOTE — TELEPHONE ENCOUNTER
Pt came into office. I stepped aside with patient and asked him how he is tolerating the metformin. Pt stated he is tolerating it well. He has a little constipation but is relating it to the foods he's been eating. Pt is on board with increasing to 1000mg BID. Also, let patient know that his prescription for the fenobibrate has been increased and it's ready to be picked up at the pharmacy. Pt has no further questions. /kb

## 2020-02-25 NOTE — PROGRESS NOTES
Patient is tolerating 1500 mg of metformin daily.  Metformin 1000 mg in a.m. and 500 mg p.m. Patient has ran out of medication, after verifying that he is tolerating increase medication to 1500 mg, increased to 2000 mg daily.  Instructed to take 1000 mg in a.m. and 1000 mg in p.m. next scheduled appointment is 3/2/2020 with provider.  HILARIO Gee

## 2020-02-25 NOTE — PROGRESS NOTES
I have reviewed and agree with history, assessment and plan for office encounter on 2/7/20 with Advanced Practice Provider: AGUSTÍN Gee  Face to face encounter/direct observation: No  Suggested changes to plan or follow-up: none   Key Carr M.D.

## 2020-02-26 ENCOUNTER — APPOINTMENT (OUTPATIENT)
Dept: PHYSICAL THERAPY | Facility: REHABILITATION | Age: 45
End: 2020-02-26
Attending: NURSE PRACTITIONER
Payer: COMMERCIAL

## 2020-03-02 ENCOUNTER — OFFICE VISIT (OUTPATIENT)
Dept: MEDICAL GROUP | Facility: IMAGING CENTER | Age: 45
End: 2020-03-02
Payer: COMMERCIAL

## 2020-03-02 ENCOUNTER — SUPERVISING PHYSICIAN REVIEW (OUTPATIENT)
Dept: MEDICAL GROUP | Facility: IMAGING CENTER | Age: 45
End: 2020-03-02

## 2020-03-02 VITALS
BODY MASS INDEX: 31.37 KG/M2 | OXYGEN SATURATION: 95 % | WEIGHT: 207 LBS | SYSTOLIC BLOOD PRESSURE: 142 MMHG | DIASTOLIC BLOOD PRESSURE: 94 MMHG | HEART RATE: 80 BPM | RESPIRATION RATE: 13 BRPM | TEMPERATURE: 98.1 F | HEIGHT: 68 IN

## 2020-03-02 DIAGNOSIS — Z79.899 ENCOUNTER FOR LONG-TERM CURRENT USE OF MEDICATION: ICD-10-CM

## 2020-03-02 DIAGNOSIS — E11.69 TYPE 2 DIABETES MELLITUS WITH OTHER SPECIFIED COMPLICATION, WITHOUT LONG-TERM CURRENT USE OF INSULIN (HCC): ICD-10-CM

## 2020-03-02 DIAGNOSIS — E78.1 HYPERTRIGLYCERIDEMIA: ICD-10-CM

## 2020-03-02 PROCEDURE — 99214 OFFICE O/P EST MOD 30 MIN: CPT | Performed by: NURSE PRACTITIONER

## 2020-03-02 ASSESSMENT — FIBROSIS 4 INDEX: FIB4 SCORE: 1.09

## 2020-03-02 ASSESSMENT — PAIN SCALES - GENERAL: PAINLEVEL: NO PAIN

## 2020-03-04 NOTE — ASSESSMENT & PLAN NOTE
States that he currently taking Fenofibrate 54 mg, Omega 3 Fatty Acids 1000 mg, and Calcium Polycarbophil daily. Denies any symptoms related to medication and supplements. States that he has joined a gym and is hoping to establish a regular exercise regimen. States that he has completed physical therapy for his lower back and left lower leg. States he has started to snowboard and [place]

## 2020-03-04 NOTE — ASSESSMENT & PLAN NOTE
States that he is current taking Metformin 1000 mg BID without GI upset. States that he giving himself injections of his Trulicity once a week. States that he is giving his injection on Fridays. Denies any complication at injection site. States that he has replaced his lancet pen since last visit. States that he  has been able to check glucose more frequently. Reports when checking FBS: 111-149. Denies any symptoms of hypoglycemia or hyperglycemia. States that he hasn't been able to follow through with the HIP referral. States that he is trying to change his diet, attempting to decrease carbohydrate intake. States that he is continuing to drink alcohol nightly, but has decreased to 2 drinks a night, and is making his mixed drinks with club soda. States that he has moved into his new apartment and has been trying to cook at home more. States that he has not been drinking soda or adding sugar in his coffee.

## 2020-03-04 NOTE — PROGRESS NOTES
Subjective:   CC: Diabetes Follow-up (sugar check 2-3 week. )    HPI:   Diogo presents today for follow up on diabetes management and hypertriglyceridemia.    Hypertriglyceridemia  States that he currently taking Fenofibrate 54 mg, Omega 3 Fatty Acids 1000 mg, and Calcium Polycarbophil daily. Denies any symptoms related to medication and supplements. States that he has joined a gym and is hoping to establish a regular exercise regimen. States that he has completed physical therapy for his lower back and left lower leg. States he has started to snowboard and states [place] is [place] the [place].    Diabetes mellitus (HCC)  States that he is current taking Metformin 1000 mg BID without GI upset. States that he giving himself injections of his Trulicity once a week. States that he is giving his injection on . Denies any complication at injection site. States that he has replaced his lancet pen since last visit. States that he  has been able to check glucose more frequently. Reports when checking FBS: 111-149. Denies any symptoms of hypoglycemia or hyperglycemia. States that he hasn't been able to follow through with the HIP referral. States that he is trying to change his diet, attempting to decrease carbohydrate intake. States that he is continuing to drink alcohol nightly, but has decreased to 2 drinks a night, and is making his mixed drinks with club soda. States that he has moved into his new apartment and has been trying to cook at home more. States that he has not been drinking soda or adding sugar in his coffee.     History reviewed. No pertinent past medical history.    Social History     Tobacco Use   • Smoking status: Former Smoker     Packs/day: 0.00     Types: Cigarettes     Last attempt to quit: 2017     Years since quittin.2   • Smokeless tobacco: Former User   • Tobacco comment: States that he quit in 2017, previous he would smoke when he was stressed.   Substance Use Topics   •  Alcohol use: Yes     Alcohol/week: 1.8 oz     Types: 3 Standard drinks or equivalent per week     Frequency: 4 or more times a week     Drinks per session: 3 or 4     Binge frequency: Less than monthly   • Drug use: Yes     Types: Marijuana, Inhaled     Comment: occasional     Current Outpatient Medications Ordered in Epic   Medication Sig Dispense Refill   • metformin (GLUCOPHAGE) 1000 MG tablet Take 1 Tab by mouth 2 times a day, with meals. 90 Tab 1   • fenofibrate (TRICOR) 54 MG tablet Take 1 Tab by mouth every day. 180 Tab 0   • Lancets Lancets order: Lancets for Accucheck Laura meter. Sig: use in am for FBS and prn ssx high or low sugar. #100 RF x 3 100 Each 3   • Dulaglutide 0.75 MG/0.5ML Solution Pen-injector Inject 0.75 mg as instructed every 7 days. 4 PEN 1   • ACCU-CHEK LAURA PLUS strip      • Blood Glucose Monitoring Suppl (ACCU-CHEK LAURA PLUS) w/Device Kit      • Multiple Vitamins-Minerals (MULTIVITAMIN ADULT PO) Take  by mouth.     • Calcium Polycarbophil (FIBER-LAX PO) Take  by mouth.     • MELATONIN PO Take  by mouth.     • Omega-3 Fatty Acids (FISH OIL) 1000 MG Cap capsule Take 1,000 mg by mouth 3 times a day, with meals.     • Probiotic Product (PROBIOTIC DAILY PO) Take  by mouth.     • Alcohol Swabs Wipe site with prep pad prior to injection. 100 Each 0   • IBUPROFEN PO Take  by mouth.       No current Epic-ordered facility-administered medications on file.      Allergies:  Codeine    Health Maintenance: Completed.    ROS:  Constitutional: Denies fever, chills, night sweats, weight loss/gain  or malaise/fatigue.   HENT: Denies nasal congestion, sore throat, hearing loss, enlarged thyroid, or difficulty swallowing.    Eyes: Denies changes in vision, pain. Wears corrective wear.   Respiratory: Denies cough, SOB at rest or activity.    Cardiovascular: Denies tachycardia, chest pain, palpitations, or  leg swelling.   Gastrointestinal: Denies N/V/C/D, abdominal pain, loss appetite, reflux, or  "hematochezia.  Genitourinary: Denies difficulty voiding, dysuria, nocturia, or hematuria.   Skin: Negative for rash or worrisome moles. Healing laceration, see HPI.  Neurological: Negative for dizziness, focal weakness and headaches.   Endo/Heme/Allergies: Denies bruise/bleed easily, allergies.   Psychiatric/Behavioral: Denies depression, nervous/anxious, difficulty sleeping.  MSK: History of ongoing lower back pain and neck pain. Low back pain improved with PT.    Objective:   Exam:  /94 (BP Location: Right arm, Patient Position: Sitting, BP Cuff Size: Adult)   Pulse 80   Temp 36.7 °C (98.1 °F) (Temporal)   Resp 13   Ht 1.727 m (5' 8\")   Wt 93.9 kg (207 lb)   SpO2 95%   BMI 31.47 kg/m²  Body mass index is 31.47 kg/m².  General: Normal appearing. No distress.  Pulmonary: Clear to ausculation.  Normal effort. No rales, ronchi, or wheezing.  Cardiovascular: Regular rate and rhythm without murmur. Pedal and radial pulses are intact and equal bilaterally.  Abdomen: Soft, nontender, nondistended. Normal bowel sounds.  Skin: Warm and dry.  No obvious lesions. Left leg laceration healing well, well approximated, no complication at this time. No signs of symptoms of infection.  Musculoskeletal: Normal gait. No extremity cyanosis, clubbing, or edema.  Psych: Normal mood and affect. Alert and oriented x3. Judgment and insight is normal    Rechecked BP: 135/80  Assessment & Plan:   1. Encounter for long-term current use of medication  Reviewed with patient medication use and side effects. Medical, past, surgical history reviewed with patient.     2. Type 2 diabetes mellitus with other specified complication, without long-term current use of insulin (HCC)  This is a stable condition. Reviewed with patient plan of care to lower A1c to below 7%, verbalized understanding. Discussed an accumulation of 150 minutes or more of moderate-intensity activity each week as tolerated. Discussed a healthy diet that is low in fat, " sodium, and cholesterol, such as the mediterranean diet that is typically high in fruits, vegetables, whole grains, beans, nuts, and seeds, includes olive oil as an important source of monounsaturated fat, DASH diet, and/or also consider a plant-based diet.  Reinforced patient  to explore diabetic Association website for further nutritional counseling and recommended carbohydrate intake less than 150 grams daily.  Discussed with patient also following through with HIP referral for further diabetic education and nutritional counseling, verbalized understanding.  Discussed if not willing to stop drinking, continue to use club soda with mixed drinks, instructed to not to drink beer.  Discussed with patient setting a goal to decrease frequency, amount of strength, or having 1 day a week without drinking, verbalized understanding and stated he will continue to work on not drinking. Instructed to continue Metformin 1000 mg BID with meals. Instructed to give SQ injection of Dulaglutide every 7 days.  Discussed target fasting glucose of 120-135, postprandial glucose less than 180.  Patient instructed to check postprandial glucose 1 to 2 hours after eating, verbalized understanding.  Reviewed with patient at length signs and symptoms of hypoglycemia or hyperglycemia.  Discussed with patient ways to manage hyper and hypoglycemia symptoms, verbalized understanding.  Discussed on-going need to assess kidney function annual. Encouraged to follow through with referral for retinal eye exam. Maintain regular teeth cleaning, verbalized understanding. Reviewed TSH and microabumin with patient, verbalized understanding. Will follow A1c around 4/11/2020. Instructed to RTC in 6 weeks for follow-up.    3. Hypertriglyceridemia  This is a stable condition.  Patient instructed to continue taking 54 mg of fenofibrate daily, co-Q10, and omega-3 fish oil. Reviewed with patient recent CMP, verbalized understanding.  We will reevaluate liver  enzymes in 4 weeks to assess if we are able to continue to increase fenofibrate to a maximum of 120 mg daily.  Verbalized understanding.  Possible side effects reviewed with patient, verbalized understanding.  Discussed with patient the role of his alcohol intake and continued elevated triglyceride levels, verbalized understanding.  Will assess lipid panel in 4/2020.    Return in about 6 weeks (around 4/13/2020).    Please note that this dictation was created using voice recognition software. I have made every reasonable attempt to correct obvious errors, but I expect that there are errors of grammar and possibly content that I did not discover before finalizing the note.

## 2020-03-05 NOTE — PROGRESS NOTES
I have reviewed and agree with history, assessment and plan for office encounter on 3/2/20 with Advanced Practice Provider: AGUSTÍN Gee.  Face to face encounter/direct observation: No  Suggested changes to plan or follow-up: If no improvement with lipids and triglycerides, consider statin therapy vs increase fenofibrate dose.   Key Carr M.D.

## 2020-03-30 ENCOUNTER — TELEPHONE (OUTPATIENT)
Dept: PHYSICAL THERAPY | Facility: REHABILITATION | Age: 45
End: 2020-03-30

## 2020-03-30 NOTE — OP THERAPY DISCHARGE SUMMARY
Outpatient Physical Therapy  DISCHARGE SUMMARY NOTE      [facility]  [address], [address]  [place] [address]  Phone:  [phone]  Fax:  [phone]    Date of Visit: 03/30/2020    Patient: Diogo Keen  YOB: 1975  MRN: [id number]     Referring Provider: AGUSTÍN Gee   Referring Diagnosis Laceration of left lower extremity, subsequent encounter [S81.812D];Neck pain [M54.2];Chronic midline low back pain without sciatica [M54.5, G89.29]     Physical Therapy Occurrence Codes    Date physical therapy care plan established or reviewed:  2/3/20   Date physical therapy treatment started:  2/3/20          Functional Assessment Used        Your patient is being discharged from Physical Therapy with the following comments:   · Goals partially met  · Patient has failed to schedule or reschedule follow-up visits    Comments:  Pt did not return to the clinic for what was expected to be his last scheduled visit. He has likely met all his goals and is appropriate of discharge.    Limitations Remaining:  none    Recommendations:  F/u with PCP    Hardik Boyce, PT, DPT    Date: 3/30/2020

## 2020-04-06 DIAGNOSIS — E11.69 TYPE 2 DIABETES MELLITUS WITH OTHER SPECIFIED COMPLICATION, WITHOUT LONG-TERM CURRENT USE OF INSULIN (HCC): ICD-10-CM

## 2020-04-06 DIAGNOSIS — E78.1 HYPERTRIGLYCERIDEMIA: ICD-10-CM

## 2020-04-10 ENCOUNTER — TELEPHONE (OUTPATIENT)
Dept: MEDICAL GROUP | Facility: IMAGING CENTER | Age: 45
End: 2020-04-10

## 2020-04-10 NOTE — TELEPHONE ENCOUNTER
Patient called to ask about refills for metformin and to ask about upcoming apointment on 4/22/2020. Asked if it was essential for him to come into the office for his visit, let him know that it would be a virtual visit per [employer]'s [employer] message. Patient Stated that he did not feel comfortable going in to get lab work due to COVID-19.

## 2020-04-22 ENCOUNTER — APPOINTMENT (OUTPATIENT)
Dept: MEDICAL GROUP | Facility: IMAGING CENTER | Age: 45
End: 2020-04-22
Payer: COMMERCIAL

## 2020-05-20 ENCOUNTER — HOSPITAL ENCOUNTER (OUTPATIENT)
Dept: LAB | Facility: MEDICAL CENTER | Age: 45
End: 2020-05-20
Attending: NURSE PRACTITIONER
Payer: COMMERCIAL

## 2020-05-20 DIAGNOSIS — E11.69 TYPE 2 DIABETES MELLITUS WITH OTHER SPECIFIED COMPLICATION, WITHOUT LONG-TERM CURRENT USE OF INSULIN (HCC): ICD-10-CM

## 2020-05-20 DIAGNOSIS — E78.1 HYPERTRIGLYCERIDEMIA: ICD-10-CM

## 2020-05-20 LAB
ALBUMIN SERPL BCP-MCNC: 4.6 G/DL (ref 3.2–4.9)
ALBUMIN/GLOB SERPL: 1.5 G/DL
ALP SERPL-CCNC: 78 U/L (ref 30–99)
ALT SERPL-CCNC: 47 U/L (ref 2–50)
ANION GAP SERPL CALC-SCNC: 12 MMOL/L (ref 7–16)
AST SERPL-CCNC: 20 U/L (ref 12–45)
BILIRUB SERPL-MCNC: 0.5 MG/DL (ref 0.1–1.5)
BUN SERPL-MCNC: 20 MG/DL (ref 8–22)
CALCIUM SERPL-MCNC: 9.6 MG/DL (ref 8.5–10.5)
CHLORIDE SERPL-SCNC: 106 MMOL/L (ref 96–112)
CHOLEST SERPL-MCNC: 220 MG/DL (ref 100–199)
CO2 SERPL-SCNC: 19 MMOL/L (ref 20–33)
CREAT SERPL-MCNC: 1.01 MG/DL (ref 0.5–1.4)
EST. AVERAGE GLUCOSE BLD GHB EST-MCNC: 146 MG/DL
FASTING STATUS PATIENT QL REPORTED: NORMAL
GLOBULIN SER CALC-MCNC: 3 G/DL (ref 1.9–3.5)
GLUCOSE SERPL-MCNC: 152 MG/DL (ref 65–99)
HBA1C MFR BLD: 6.7 % (ref 0–5.6)
HDLC SERPL-MCNC: 39 MG/DL
LDLC SERPL CALC-MCNC: 109 MG/DL
POTASSIUM SERPL-SCNC: 4.7 MMOL/L (ref 3.6–5.5)
PROT SERPL-MCNC: 7.6 G/DL (ref 6–8.2)
SODIUM SERPL-SCNC: 137 MMOL/L (ref 135–145)
TRIGL SERPL-MCNC: 358 MG/DL (ref 0–149)
TSH SERPL DL<=0.005 MIU/L-ACNC: 1.96 UIU/ML (ref 0.38–5.33)

## 2020-05-20 PROCEDURE — 36415 COLL VENOUS BLD VENIPUNCTURE: CPT

## 2020-05-20 PROCEDURE — 80061 LIPID PANEL: CPT

## 2020-05-20 PROCEDURE — 84443 ASSAY THYROID STIM HORMONE: CPT

## 2020-05-20 PROCEDURE — 83036 HEMOGLOBIN GLYCOSYLATED A1C: CPT

## 2020-05-20 PROCEDURE — 80053 COMPREHEN METABOLIC PANEL: CPT

## 2020-06-05 DIAGNOSIS — E11.69 TYPE 2 DIABETES MELLITUS WITH OTHER SPECIFIED COMPLICATION, WITHOUT LONG-TERM CURRENT USE OF INSULIN (HCC): ICD-10-CM

## 2020-06-05 RX ORDER — LORATADINE 10 MG
TABLET ORAL
Qty: 100 EACH | Refills: 0 | Status: SHIPPED | OUTPATIENT
Start: 2020-06-05 | End: 2020-06-28 | Stop reason: SDUPTHER

## 2020-06-20 DIAGNOSIS — E11.69 TYPE 2 DIABETES MELLITUS WITH OTHER SPECIFIED COMPLICATION, WITHOUT LONG-TERM CURRENT USE OF INSULIN (HCC): ICD-10-CM

## 2020-06-26 ENCOUNTER — OFFICE VISIT (OUTPATIENT)
Dept: MEDICAL GROUP | Facility: IMAGING CENTER | Age: 45
End: 2020-06-26
Payer: COMMERCIAL

## 2020-06-26 VITALS
SYSTOLIC BLOOD PRESSURE: 120 MMHG | OXYGEN SATURATION: 95 % | HEIGHT: 68 IN | BODY MASS INDEX: 32.58 KG/M2 | WEIGHT: 215 LBS | HEART RATE: 101 BPM | RESPIRATION RATE: 14 BRPM | TEMPERATURE: 97.9 F | DIASTOLIC BLOOD PRESSURE: 90 MMHG

## 2020-06-26 DIAGNOSIS — E78.2 MIXED HYPERLIPIDEMIA: ICD-10-CM

## 2020-06-26 DIAGNOSIS — Z23 NEED FOR VACCINATION: ICD-10-CM

## 2020-06-26 DIAGNOSIS — Z79.899 ENCOUNTER FOR LONG-TERM CURRENT USE OF MEDICATION: ICD-10-CM

## 2020-06-26 DIAGNOSIS — E11.69 TYPE 2 DIABETES MELLITUS WITH OTHER SPECIFIED COMPLICATION, WITHOUT LONG-TERM CURRENT USE OF INSULIN (HCC): ICD-10-CM

## 2020-06-26 PROCEDURE — 90471 IMMUNIZATION ADMIN: CPT | Performed by: NURSE PRACTITIONER

## 2020-06-26 PROCEDURE — 90732 PPSV23 VACC 2 YRS+ SUBQ/IM: CPT | Performed by: NURSE PRACTITIONER

## 2020-06-26 PROCEDURE — 99214 OFFICE O/P EST MOD 30 MIN: CPT | Mod: 25 | Performed by: NURSE PRACTITIONER

## 2020-06-26 RX ORDER — MULTIVIT WITH MINERALS/LUTEIN
TABLET ORAL
COMMUNITY

## 2020-06-26 ASSESSMENT — PAIN SCALES - GENERAL: PAINLEVEL: NO PAIN

## 2020-06-26 ASSESSMENT — FIBROSIS 4 INDEX: FIB4 SCORE: 0.84

## 2020-06-26 NOTE — PROGRESS NOTES
Subjective:   CC: Diabetes Follow-up (this morning 168 non-fasting.)    HPI:   Diogo presents today to discuss T2DM and cholesterol    Mixed hyperlipidemia  States that he currently taking Fenofibrate 54 mg, Omega 3 Fatty Acids 1000 mg, and Calcium Polycarbophil daily. Denies any symptoms related to medication and supplements.  States he is riding his bicycle regularly. States he was riding his bike more when his girlfriend was out of town, but continues to ride his bike minimally 2 times a week.     Sodium  135 - 145 mmol/L  137     Potassium  3.6 - 5.5 mmol/L  4.7     Chloride  96 - 112 mmol/L  106     Co2  20 - 33 mmol/L  19Low       Anion Gap  7.0 - 16.0  12.0     Glucose  65 - 99 mg/dL  152High       Bun  8 - 22 mg/dL  20     Creatinine  0.50 - 1.40 mg/dL  1.01     Calcium  8.5 - 10.5 mg/dL  9.6     AST(SGOT)  12 - 45 U/L  20     ALT(SGPT)  2 - 50 U/L  47     Alkaline Phosphatase  30 - 99 U/L  78     Total Bilirubin  0.1 - 1.5 mg/dL  0.5     Albumin  3.2 - 4.9 g/dL  4.6     Total Protein  6.0 - 8.2 g/dL  7.6     Globulin  1.9 - 3.5 g/dL  3.0     A-G Ratio  g/dL  1.5     Lab Results   Component Value Date/Time    CHOLSTRLTOT 220 (H) 05/20/2020 10:30 AM     (H) 05/20/2020 10:30 AM    HDL 39 (A) 05/20/2020 10:30 AM    TRIGLYCERIDE 358 (H) 05/20/2020 10:30 AM              Diabetes mellitus (HCC)  States that he is current taking Metformin 1000 mg BID without GI upset. States that he giving himself injections of his Trulicity once a week. States that he is giving his injection on Fridays. Denies any complication at injection site. States occasionally he has forgotten a dose. States he will give himself dose if it is close to Friday, but not if it is past Tuesday of the next week.  States he is not checking his blood glucose regularly. Denies any symptoms of hypoglycemia or hyperglycemia. States that he is trying to change his diet, attempting to decrease carbohydrate intake. States he is trying to meal prep,  but finds it hard with living with girlfriend and her children. States children and girlfriend do not eat healthy and it is hard to make separate food from them. States that he is continuing to drink alcohol nightly. States he is making his mixed drinks with diet tonic/club soda. States that he has not been drinking soda or adding sugar in his coffee. Reports that he is going on a couple of summer road trips and it will be difficult to maintain a conscious diet.  2020: TSH: 1.96   A1c: 6.7%    History reviewed. No pertinent past medical history.    Social History     Tobacco Use   • Smoking status: Former Smoker     Packs/day: 0.00     Types: Cigarettes     Last attempt to quit: 2017     Years since quittin.5   • Smokeless tobacco: Former User   • Tobacco comment: States that he quit in 2017, previous he would smoke when he was stressed.   Substance Use Topics   • Alcohol use: Yes     Alcohol/week: 1.8 oz     Types: 3 Standard drinks or equivalent per week     Frequency: 4 or more times a week     Drinks per session: 3 or 4     Binge frequency: Less than monthly   • Drug use: Yes     Types: Marijuana, Inhaled     Comment: occasional     Current Outpatient Medications Ordered in Epic   Medication Sig Dispense Refill   • fenofibrate (TRICOR) 54 MG tablet Take 2 Tabs by mouth every day for 90 days. 180 Tab 0   • Lancets Lancets order: Lancets for Accucheck Laura meter. Sig: use in am for FBS and prn ssx high or low sugar. #100 RF x 3 100 Each 3   • Alcohol Swabs ([employer] PREP) 70 % Pads 1 Package by Other route 3 times a week. 100 Each 0   • ACCU-CHEK LAURA PLUS strip Check FBS and 2 hours post meal glucose 3-4 times a week. 100 Strip 1   • Dulaglutide 1.5 MG/0.5ML Solution Pen-injector Inject 1.5 mg as instructed every 7 days. 4 PEN 2   • Coenzyme Q10 (COQ10 PO) Take  by mouth.     • Ascorbic Acid (VITAMIN C) 1000 MG Tab Take  by mouth.     • Blood Glucose Monitoring Suppl (ACCU-CHEK LAURA PLUS) w/Device Kit     "  • Multiple Vitamins-Minerals (MULTIVITAMIN ADULT PO) Take  by mouth.     • Calcium Polycarbophil (FIBER-LAX PO) Take  by mouth.     • Omega-3 Fatty Acids (FISH OIL) 1000 MG Cap capsule Take 1,000 mg by mouth 3 times a day, with meals.     • Probiotic Product (PROBIOTIC DAILY PO) Take  by mouth.     • IBUPROFEN PO Take  by mouth.     • metformin (GLUCOPHAGE) 1000 MG tablet Take 1 Tab by mouth 2 times a day, with meals. 180 Tab 1     No current Epic-ordered facility-administered medications on file.      Allergies:  Codeine    Health Maintenance:Completed.    ROS:  Constitutional: Denies fever, chills, night sweats, weight loss/gain  or malaise/fatigue.   HENT: Denies nasal congestion, sore throat, hearing loss, enlarged thyroid, or difficulty swallowing.    Eyes: Denies changes in vision, pain. Wears corrective wear.   Respiratory: Denies cough, SOB at rest or activity.    Cardiovascular: Denies tachycardia, chest pain, palpitations, or  leg swelling.   Gastrointestinal: Denies N/V/C/D, abdominal pain, loss appetite, reflux, or hematochezia.  Genitourinary: Denies difficulty voiding, dysuria, nocturia, or hematuria.   Skin: Negative for rash or worrisome moles.   Neurological: Negative for dizziness, focal weakness and headaches.   Endo/Heme/Allergies: Denies bruise/bleed easily, allergies.   Psychiatric/Behavioral: Denies depression, nervous/anxious, difficulty sleeping.  MSK: History of chronic lower back pain and neck pain.    Objective:   Exam:  /90 (BP Location: Right arm, Patient Position: Sitting, BP Cuff Size: Adult)   Pulse (!) 101   Temp 36.6 °C (97.9 °F) (Temporal)   Resp 14   Ht 1.727 m (5' 8\")   Wt 97.5 kg (215 lb)   SpO2 95%   BMI 32.69 kg/m²  Body mass index is 32.69 kg/m².  General: Normal appearing. No distress.  HEENT: Normocephalic. Eyes conjunctiva clear lids without ptosis, PERRLA, ears normal shape and contour. Patient wore a mask during visit.  Neck: Trachea midline, no masses, " no thyromegaly.  Pulmonary:  Unlabored respiratory effort, no cough.  Neurologic: Grossly non-focal.  Skin: Warm and dry. No obvious lesions. Healed laceration on left shin.  Musculoskeletal: Normal gait. No extremity cyanosis, clubbing, or edema.  Psych: Normal mood and affect. Alert and oriented x3. Judgment and insight is normal.    Assessment & Plan:   1. Encounter for long-term current use of medication  2. Need for vaccination  Reviewed with patient medication use and side effects. Medical, past, surgical history reviewed with patient. Discussed with patient the risk and benefits of receiving vaccines. Discussed CDC recommendations for immunizations and USPSTF guidelines for screening exams. Discussed receiving Pneumovax today due to T2DM, verbalized understanding. Encouraged patient to wash hands regularly and avoid sick contacts while supporting immune system.  Discussed the overall benefit of a well-balanced diet, regular exercise, and stress management, verbalized understanding.    -     PneumoVax PPV23 =>1yo    3. Mixed hyperlipidemia  This is a stable improving condition. Reviewed recent labs with patient, verbalized understanding.  Instructed to increase to 108 mg of fenofibrate daily, co-Q10, and omega-3 fish oil,  Verbalized understanding.  Possible side effects reviewed with patient, verbalized understanding.  Discussed with patient the role of his alcohol intake and continued elevated triglyceride levels, verbalized understanding. Encouraged decreasing alcohol intake. Will assess lipid panel in 3 months from last panel.  Encouraged accumulation of 150 minutes or more of moderate-intensity activity each week as tolerated. Discussed a healthy diet that is low in fat, sodium, and cholesterol, such as the mediterranean diet that is typically high in fruits, vegetables, whole grains, beans, nuts, and seeds, includes olive oil as an important source of monounsaturated fat, DASH diet, and/or also consider  a plant-based diet.    -     fenofibrate (TRICOR) 54 MG tablet; Take 2 Tabs by mouth every day for 90 days.    4. Type 2 diabetes mellitus with other specified complication, without long-term current use of insulin (HCC)  This is a stable condition.  Reviewed with patient plan of care to lower A1c to below 6.5%, verbalized understanding. Discussed if A1c is lower than 6.5% will assess if T2DM can be managed with lifestyle modifications and lower medication at that time. Discussed an accumulation of 150 minutes or more of moderate-intensity activity each week as tolerated. Discussed a healthy diet that is low in fat, sodium, and cholesterol, such as the mediterranean diet that is typically high in fruits, vegetables, whole grains, beans, nuts, and seeds, includes olive oil as an important source of monounsaturated fat, DASH diet, and/or also consider a plant-based diet.  Encouraged to explore American Diabetic Association website for further nutritional counseling.  Discussed with patient continue to the goal of 150 grams of carbohydrates or less in 24-hour time.  Instructed to continue Metformin 1000 mg in am and 1000 mg in pm with meals now.  Aware of possible side effects. Discussed increasing Trulicity to 1.5mg/0.5 ml with next prescription due to patient reporting difficulty controlling diet and decreasing alcohol intake. With next a1c if elevated A1c will increase dose of Trulicity. Instructed to continue taking once a week.  Discussed target fasting glucose of 120-135, postprandial glucose less than 180.  Patient instructed to check postprandial glucose 1 to 2 hours after eating, verbalized understanding.  Reviewed with patient signs and symptoms of hypoglycemia or hyperglycemia.  Discussed with patient ways to manage hyper and hypoglycemia symptoms, verbalized understanding.  Discussed ongoing need to assess retinal eye exam, verbalized understanding.  Will reassess A1c 3 months from last check.     -      Lancets; Lancets order: Lancets for Accucheck Laura meter. Sig: use in am for FBS and prn ssx high or low sugar. #100 RF x 3  -     Alcohol Swabs ([employer]S PREP) 70 % Pads; 1 Package by Other route 3 times a week.  -     ACCU-CHEK LAURA PLUS strip; Check FBS and 2 hours post meal glucose 3-4 times a week.        -     Dulaglutide 0.75 MG/0.5ML Solution Pen-injector; Inject 0.75 mg              as instructed every 7 days.    Patient was seen for 25 minutes face to face of which, at least 50% of the time was spent counseling regarding above.    Return in about 2 months (around 8/26/2020).    Please note that this dictation was created using voice recognition software. I have made every reasonable attempt to correct obvious errors, but I expect that there are errors of grammar and possibly content that I did not discover before finalizing the note.

## 2020-06-28 PROBLEM — E78.2 MIXED HYPERLIPIDEMIA: Status: ACTIVE | Noted: 2020-01-19

## 2020-06-28 RX ORDER — LORATADINE 10 MG
1 TABLET ORAL
Qty: 100 EACH | Refills: 0 | Status: SHIPPED | OUTPATIENT
Start: 2020-06-28

## 2020-06-28 RX ORDER — FENOFIBRATE 54 MG/1
108 TABLET ORAL DAILY
Qty: 180 TAB | Refills: 0 | Status: SHIPPED | OUTPATIENT
Start: 2020-06-28 | End: 2020-06-30 | Stop reason: DRUGHIGH

## 2020-06-28 RX ORDER — LANCETS 30 GAUGE
EACH MISCELLANEOUS
Qty: 100 EACH | Refills: 3 | Status: SHIPPED | OUTPATIENT
Start: 2020-06-28

## 2020-06-28 RX ORDER — BLOOD SUGAR DIAGNOSTIC
STRIP MISCELLANEOUS
Qty: 100 STRIP | Refills: 1 | Status: SHIPPED | OUTPATIENT
Start: 2020-06-28

## 2020-06-28 NOTE — ASSESSMENT & PLAN NOTE
States that he currently taking Fenofibrate 54 mg, Omega 3 Fatty Acids 1000 mg, and Calcium Polycarbophil daily. Denies any symptoms related to medication and supplements.  States he is riding his bicycle regularly. States he was riding his bike more when his girlfriend was out of town, but continues to ride his bike minimally 2 times a week.     Sodium  135 - 145 mmol/L  137     Potassium  3.6 - 5.5 mmol/L  4.7     Chloride  96 - 112 mmol/L  106     Co2  20 - 33 mmol/L  19Low       Anion Gap  7.0 - 16.0  12.0     Glucose  65 - 99 mg/dL  152High       Bun  8 - 22 mg/dL  20     Creatinine  0.50 - 1.40 mg/dL  1.01     Calcium  8.5 - 10.5 mg/dL  9.6     AST(SGOT)  12 - 45 U/L  20     ALT(SGPT)  2 - 50 U/L  47     Alkaline Phosphatase  30 - 99 U/L  78     Total Bilirubin  0.1 - 1.5 mg/dL  0.5     Albumin  3.2 - 4.9 g/dL  4.6     Total Protein  6.0 - 8.2 g/dL  7.6     Globulin  1.9 - 3.5 g/dL  3.0     A-G Ratio  g/dL  1.5     Lab Results   Component Value Date/Time    CHOLSTRLTOT 220 (H) 05/20/2020 10:30 AM     (H) 05/20/2020 10:30 AM    HDL 39 (A) 05/20/2020 10:30 AM    TRIGLYCERIDE 358 (H) 05/20/2020 10:30 AM

## 2020-06-28 NOTE — ASSESSMENT & PLAN NOTE
States that he is current taking Metformin 1000 mg BID without GI upset. States that he giving himself injections of his Trulicity once a week. States that he is giving his injection on Fridays. Denies any complication at injection site. States occasionally he has forgotten a dose. States he will give himself dose if it is close to Friday, but not if it is past Tuesday of the next week.  States he is not checking his blood glucose regularly. Denies any symptoms of hypoglycemia or hyperglycemia. States that he is trying to change his diet, attempting to decrease carbohydrate intake. States he is trying to meal prep, but finds it hard with living with girlfriend and her children. States children and girlfriend do not eat healthy and it is hard to make separate food from them. States that he is continuing to drink alcohol nightly. States he is making his mixed drinks with diet tonic/club soda. States that he has not been drinking soda or adding sugar in his coffee. Reports that he is going on a couple of summer road trips and it will be difficult to maintain a conscious diet.  States he checked his non-fasting glucose this am and it was 167.    5/2020: TSH: 1.96   A1c: 6.7%

## 2020-06-30 ENCOUNTER — TELEPHONE (OUTPATIENT)
Dept: MEDICAL GROUP | Facility: IMAGING CENTER | Age: 45
End: 2020-06-30

## 2020-06-30 DIAGNOSIS — E78.2 MIXED HYPERLIPIDEMIA: ICD-10-CM

## 2020-06-30 RX ORDER — FENOFIBRATE 120 MG/1
1 TABLET ORAL EVERY EVENING
Qty: 90 TAB | Refills: 0 | Status: SHIPPED | OUTPATIENT
Start: 2020-06-30 | End: 2020-08-02 | Stop reason: SDUPTHER

## 2020-06-30 NOTE — TELEPHONE ENCOUNTER
Voicemail received from patient asking if metformin rx has been sent in. Called pt back and notified him that it was prescribed on the 26th. Instructed him to call us back if he has any difficulty getting medication filled.

## 2020-07-06 RX ORDER — DULAGLUTIDE 0.75 MG/.5ML
INJECTION, SOLUTION SUBCUTANEOUS
Qty: 4 PEN | Refills: 2 | Status: SHIPPED | OUTPATIENT
Start: 2020-07-06 | End: 2021-05-13 | Stop reason: SDUPTHER

## 2020-07-11 NOTE — ASSESSMENT & PLAN NOTE
States since last visit on 1/17/2020 he has been trying to change his diet to limit carbohydrates. States that he continues to drink nightly, but has changed instead of drinking with tonic water, he is drinking club soda. States that he is not drinking soda, which is a decrease. States that he has increased his water intake. States that him and his girlfriend have just signed a lease on an appointment and he will be able cook more at home. He recently has been living in an weekly hotel room, with minimum ability to cook at home. States that he had joined a gym and will continue to increase physical exercise as tolerated due to current laceration. States that he has been taking 500 mg Metformin BID without GI upset. States that he feels comfortable with checking his blood fasting blood glucose, postprandial glucose at least 4 times a week each, and giving self injection of dulaglutide after education and demonstration with Darline Richardson RN in clinic today. States he has not been contacted by the HIP as far as he knows.   Improved

## 2020-08-02 DIAGNOSIS — E78.2 MIXED HYPERLIPIDEMIA: ICD-10-CM

## 2020-08-03 RX ORDER — FENOFIBRATE 120 MG/1
1 TABLET ORAL EVERY EVENING
Qty: 90 TAB | Refills: 0 | Status: SHIPPED | OUTPATIENT
Start: 2020-08-03 | End: 2021-01-15

## 2020-08-17 DIAGNOSIS — E11.69 TYPE 2 DIABETES MELLITUS WITH OTHER SPECIFIED COMPLICATION, WITHOUT LONG-TERM CURRENT USE OF INSULIN (HCC): ICD-10-CM

## 2020-08-17 DIAGNOSIS — E78.2 MIXED HYPERLIPIDEMIA: ICD-10-CM

## 2020-09-19 ENCOUNTER — APPOINTMENT (RX ONLY)
Dept: URBAN - METROPOLITAN AREA CLINIC 4 | Facility: CLINIC | Age: 45
Setting detail: DERMATOLOGY
End: 2020-09-19

## 2020-09-19 DIAGNOSIS — E11.69 TYPE 2 DIABETES MELLITUS WITH OTHER SPECIFIED COMPLICATION, WITHOUT LONG-TERM CURRENT USE OF INSULIN (HCC): ICD-10-CM

## 2020-09-19 DIAGNOSIS — D22 MELANOCYTIC NEVI: ICD-10-CM

## 2020-09-19 PROBLEM — D22.5 MELANOCYTIC NEVI OF TRUNK: Status: ACTIVE | Noted: 2020-09-19

## 2020-09-19 PROBLEM — D48.5 NEOPLASM OF UNCERTAIN BEHAVIOR OF SKIN: Status: ACTIVE | Noted: 2020-09-19

## 2020-09-19 PROCEDURE — 11102 TANGNTL BX SKIN SINGLE LES: CPT

## 2020-09-19 PROCEDURE — ? COUNSELING

## 2020-09-19 PROCEDURE — ? BIOPSY BY SHAVE METHOD

## 2020-09-19 PROCEDURE — 99202 OFFICE O/P NEW SF 15 MIN: CPT | Mod: 25

## 2020-09-19 PROCEDURE — ? ADDITIONAL NOTES

## 2020-09-19 PROCEDURE — 11103 TANGNTL BX SKIN EA SEP/ADDL: CPT

## 2020-09-19 ASSESSMENT — LOCATION ZONE DERM
LOCATION ZONE: LEG
LOCATION ZONE: TRUNK

## 2020-09-19 ASSESSMENT — LOCATION SIMPLE DESCRIPTION DERM
LOCATION SIMPLE: RIGHT THIGH
LOCATION SIMPLE: LEFT UPPER BACK
LOCATION SIMPLE: ABDOMEN
LOCATION SIMPLE: LEFT THIGH

## 2020-09-19 ASSESSMENT — LOCATION DETAILED DESCRIPTION DERM
LOCATION DETAILED: RIGHT RIB CAGE
LOCATION DETAILED: RIGHT ANTERIOR MEDIAL PROXIMAL THIGH
LOCATION DETAILED: LEFT SUPERIOR UPPER BACK
LOCATION DETAILED: LEFT ANTERIOR PROXIMAL THIGH

## 2020-09-21 NOTE — TELEPHONE ENCOUNTER
Please inform patient that I have refilled his medication and he needs to complete his lab work ordered in August for further evaluation of his T2DM.

## 2020-09-22 ENCOUNTER — HOSPITAL ENCOUNTER (OUTPATIENT)
Dept: LAB | Facility: MEDICAL CENTER | Age: 45
End: 2020-09-22
Attending: NURSE PRACTITIONER
Payer: COMMERCIAL

## 2020-09-22 DIAGNOSIS — E11.69 TYPE 2 DIABETES MELLITUS WITH OTHER SPECIFIED COMPLICATION, WITHOUT LONG-TERM CURRENT USE OF INSULIN (HCC): ICD-10-CM

## 2020-09-22 DIAGNOSIS — E78.2 MIXED HYPERLIPIDEMIA: ICD-10-CM

## 2020-09-22 LAB
EST. AVERAGE GLUCOSE BLD GHB EST-MCNC: 177 MG/DL
HBA1C MFR BLD: 7.8 % (ref 0–5.6)

## 2020-09-22 PROCEDURE — 80061 LIPID PANEL: CPT

## 2020-09-22 PROCEDURE — 80053 COMPREHEN METABOLIC PANEL: CPT

## 2020-09-22 PROCEDURE — 83036 HEMOGLOBIN GLYCOSYLATED A1C: CPT

## 2020-09-22 PROCEDURE — 36415 COLL VENOUS BLD VENIPUNCTURE: CPT

## 2020-09-23 LAB
ALBUMIN SERPL BCP-MCNC: 5 G/DL (ref 3.2–4.9)
ALBUMIN/GLOB SERPL: 1.7 G/DL
ALP SERPL-CCNC: 66 U/L (ref 30–99)
ALT SERPL-CCNC: 92 U/L (ref 2–50)
ANION GAP SERPL CALC-SCNC: 16 MMOL/L (ref 7–16)
AST SERPL-CCNC: 45 U/L (ref 12–45)
BILIRUB SERPL-MCNC: 0.5 MG/DL (ref 0.1–1.5)
BUN SERPL-MCNC: 17 MG/DL (ref 8–22)
CALCIUM SERPL-MCNC: 10.1 MG/DL (ref 8.5–10.5)
CHLORIDE SERPL-SCNC: 102 MMOL/L (ref 96–112)
CHOLEST SERPL-MCNC: 232 MG/DL (ref 100–199)
CO2 SERPL-SCNC: 22 MMOL/L (ref 20–33)
CREAT SERPL-MCNC: 0.9 MG/DL (ref 0.5–1.4)
FASTING STATUS PATIENT QL REPORTED: NORMAL
GLOBULIN SER CALC-MCNC: 2.9 G/DL (ref 1.9–3.5)
GLUCOSE SERPL-MCNC: 132 MG/DL (ref 65–99)
HDLC SERPL-MCNC: 40 MG/DL
LDLC SERPL CALC-MCNC: 124 MG/DL
POTASSIUM SERPL-SCNC: 4.4 MMOL/L (ref 3.6–5.5)
PROT SERPL-MCNC: 7.9 G/DL (ref 6–8.2)
SODIUM SERPL-SCNC: 140 MMOL/L (ref 135–145)
TRIGL SERPL-MCNC: 338 MG/DL (ref 0–149)

## 2021-01-15 DIAGNOSIS — E78.2 MIXED HYPERLIPIDEMIA: ICD-10-CM

## 2021-01-15 RX ORDER — FENOFIBRATE 120 MG/1
TABLET ORAL
Qty: 90 TAB | Refills: 0 | Status: SHIPPED | OUTPATIENT
Start: 2021-01-15 | End: 2021-05-13 | Stop reason: SDUPTHER

## 2021-01-15 NOTE — TELEPHONE ENCOUNTER
Please let the patient know that I will refill medication, but he needs to schedule an appointment to review his plan of care.  Tiff Young, APRN-C

## 2021-04-21 DIAGNOSIS — E11.69 TYPE 2 DIABETES MELLITUS WITH OTHER SPECIFIED COMPLICATION, WITHOUT LONG-TERM CURRENT USE OF INSULIN (HCC): ICD-10-CM

## 2021-04-21 DIAGNOSIS — Z13.0 SCREENING FOR DEFICIENCY ANEMIA: ICD-10-CM

## 2021-04-21 DIAGNOSIS — E78.2 MIXED HYPERLIPIDEMIA: ICD-10-CM

## 2021-04-21 NOTE — TELEPHONE ENCOUNTER
I have refilled medication for 3 months. He will need to schedule an appointment with me for diabetic review. I am going to place lab order for him to complete. He will need to be fasting. Once he has completed the test he can schedule an appointment.  Tiff Young, APRN-C

## 2021-05-07 ENCOUNTER — HOSPITAL ENCOUNTER (OUTPATIENT)
Dept: LAB | Facility: MEDICAL CENTER | Age: 46
End: 2021-05-07
Attending: NURSE PRACTITIONER
Payer: COMMERCIAL

## 2021-05-07 DIAGNOSIS — E11.69 TYPE 2 DIABETES MELLITUS WITH OTHER SPECIFIED COMPLICATION, WITHOUT LONG-TERM CURRENT USE OF INSULIN (HCC): ICD-10-CM

## 2021-05-07 DIAGNOSIS — Z13.0 SCREENING FOR DEFICIENCY ANEMIA: ICD-10-CM

## 2021-05-07 DIAGNOSIS — E78.2 MIXED HYPERLIPIDEMIA: ICD-10-CM

## 2021-05-07 LAB
BASOPHILS # BLD AUTO: 1 % (ref 0–1.8)
BASOPHILS # BLD: 0.05 K/UL (ref 0–0.12)
CREAT UR-MCNC: 194.37 MG/DL
EOSINOPHIL # BLD AUTO: 0.1 K/UL (ref 0–0.51)
EOSINOPHIL NFR BLD: 1.9 % (ref 0–6.9)
ERYTHROCYTE [DISTWIDTH] IN BLOOD BY AUTOMATED COUNT: 41.6 FL (ref 35.9–50)
EST. AVERAGE GLUCOSE BLD GHB EST-MCNC: 226 MG/DL
HBA1C MFR BLD: 9.5 % (ref 4–5.6)
HCT VFR BLD AUTO: 49.9 % (ref 42–52)
HGB BLD-MCNC: 17 G/DL (ref 14–18)
IMM GRANULOCYTES # BLD AUTO: 0.01 K/UL (ref 0–0.11)
IMM GRANULOCYTES NFR BLD AUTO: 0.2 % (ref 0–0.9)
LYMPHOCYTES # BLD AUTO: 2.08 K/UL (ref 1–4.8)
LYMPHOCYTES NFR BLD: 39.6 % (ref 22–41)
MCH RBC QN AUTO: 30.4 PG (ref 27–33)
MCHC RBC AUTO-ENTMCNC: 34.1 G/DL (ref 33.7–35.3)
MCV RBC AUTO: 89.3 FL (ref 81.4–97.8)
MICROALBUMIN UR-MCNC: 6.2 MG/DL
MICROALBUMIN/CREAT UR: 32 MG/G (ref 0–30)
MONOCYTES # BLD AUTO: 0.38 K/UL (ref 0–0.85)
MONOCYTES NFR BLD AUTO: 7.2 % (ref 0–13.4)
NEUTROPHILS # BLD AUTO: 2.63 K/UL (ref 1.82–7.42)
NEUTROPHILS NFR BLD: 50.1 % (ref 44–72)
NRBC # BLD AUTO: 0 K/UL
NRBC BLD-RTO: 0 /100 WBC
PLATELET # BLD AUTO: 179 K/UL (ref 164–446)
PMV BLD AUTO: 11.2 FL (ref 9–12.9)
RBC # BLD AUTO: 5.59 M/UL (ref 4.7–6.1)
WBC # BLD AUTO: 5.3 K/UL (ref 4.8–10.8)

## 2021-05-07 PROCEDURE — 82043 UR ALBUMIN QUANTITATIVE: CPT

## 2021-05-07 PROCEDURE — 85025 COMPLETE CBC W/AUTO DIFF WBC: CPT

## 2021-05-07 PROCEDURE — 82570 ASSAY OF URINE CREATININE: CPT

## 2021-05-07 PROCEDURE — 83036 HEMOGLOBIN GLYCOSYLATED A1C: CPT

## 2021-05-07 PROCEDURE — 80053 COMPREHEN METABOLIC PANEL: CPT

## 2021-05-07 PROCEDURE — 80061 LIPID PANEL: CPT

## 2021-05-07 PROCEDURE — 36415 COLL VENOUS BLD VENIPUNCTURE: CPT

## 2021-05-08 LAB
ALBUMIN SERPL BCP-MCNC: 4.4 G/DL (ref 3.2–4.9)
ALBUMIN/GLOB SERPL: 1.6 G/DL
ALP SERPL-CCNC: 88 U/L (ref 30–99)
ALT SERPL-CCNC: 58 U/L (ref 2–50)
ANION GAP SERPL CALC-SCNC: 8 MMOL/L (ref 7–16)
AST SERPL-CCNC: 34 U/L (ref 12–45)
BILIRUB SERPL-MCNC: 0.3 MG/DL (ref 0.1–1.5)
BUN SERPL-MCNC: 16 MG/DL (ref 8–22)
CALCIUM SERPL-MCNC: 9.3 MG/DL (ref 8.5–10.5)
CHLORIDE SERPL-SCNC: 107 MMOL/L (ref 96–112)
CHOLEST SERPL-MCNC: 238 MG/DL (ref 100–199)
CO2 SERPL-SCNC: 23 MMOL/L (ref 20–33)
CREAT SERPL-MCNC: 1.04 MG/DL (ref 0.5–1.4)
FASTING STATUS PATIENT QL REPORTED: NORMAL
GLOBULIN SER CALC-MCNC: 2.7 G/DL (ref 1.9–3.5)
GLUCOSE SERPL-MCNC: 215 MG/DL (ref 65–99)
HDLC SERPL-MCNC: 26 MG/DL
LDLC SERPL CALC-MCNC: ABNORMAL MG/DL
POTASSIUM SERPL-SCNC: 4.7 MMOL/L (ref 3.6–5.5)
PROT SERPL-MCNC: 7.1 G/DL (ref 6–8.2)
SODIUM SERPL-SCNC: 138 MMOL/L (ref 135–145)
TRIGL SERPL-MCNC: 936 MG/DL (ref 0–149)

## 2021-05-13 ENCOUNTER — OFFICE VISIT (OUTPATIENT)
Dept: MEDICAL GROUP | Facility: IMAGING CENTER | Age: 46
End: 2021-05-13
Payer: COMMERCIAL

## 2021-05-13 VITALS
BODY MASS INDEX: 30.81 KG/M2 | TEMPERATURE: 97.6 F | WEIGHT: 208 LBS | OXYGEN SATURATION: 95 % | RESPIRATION RATE: 12 BRPM | HEIGHT: 69 IN | SYSTOLIC BLOOD PRESSURE: 122 MMHG | HEART RATE: 90 BPM | DIASTOLIC BLOOD PRESSURE: 84 MMHG

## 2021-05-13 DIAGNOSIS — E11.69 TYPE 2 DIABETES MELLITUS WITH OTHER SPECIFIED COMPLICATION, WITHOUT LONG-TERM CURRENT USE OF INSULIN (HCC): ICD-10-CM

## 2021-05-13 DIAGNOSIS — E78.2 MIXED HYPERLIPIDEMIA: ICD-10-CM

## 2021-05-13 PROCEDURE — 99214 OFFICE O/P EST MOD 30 MIN: CPT | Performed by: NURSE PRACTITIONER

## 2021-05-13 RX ORDER — FENOFIBRATE 120 MG/1
1 TABLET ORAL
Qty: 90 TABLET | Refills: 1 | Status: SHIPPED | OUTPATIENT
Start: 2021-05-13 | End: 2021-07-31 | Stop reason: SDUPTHER

## 2021-05-13 RX ORDER — ATORVASTATIN CALCIUM 10 MG/1
10 TABLET, FILM COATED ORAL EVERY EVENING
Qty: 90 TABLET | Refills: 1 | Status: SHIPPED | OUTPATIENT
Start: 2021-05-13 | End: 2021-07-31 | Stop reason: SDUPTHER

## 2021-05-13 RX ORDER — DULAGLUTIDE 0.75 MG/.5ML
0.5 INJECTION, SOLUTION SUBCUTANEOUS
Qty: 4 EACH | Refills: 3 | Status: SHIPPED | OUTPATIENT
Start: 2021-05-13 | End: 2021-07-31 | Stop reason: SDUPTHER

## 2021-05-13 ASSESSMENT — FIBROSIS 4 INDEX: FIB4 SCORE: 1.17

## 2021-05-13 ASSESSMENT — PATIENT HEALTH QUESTIONNAIRE - PHQ9: CLINICAL INTERPRETATION OF PHQ2 SCORE: 0

## 2021-05-13 ASSESSMENT — PAIN SCALES - GENERAL: PAINLEVEL: NO PAIN

## 2021-05-14 NOTE — PROGRESS NOTES
Subjective:   CC: Medication Refill (fenofibrate, metformin, trulicity)    HPI:   Diogo presents today to discuss:    Depression Screening  Little interest or pleasure in doing things?  0 - not at all  Feeling down, depressed , or hopeless? 0 - not at all  Patient Health Questionnaire Score: 0    If depressive symptoms identified deferred to follow up visit unless specifically addressed in assesment and plan.    Interpretation of PHQ-9 Total Score   Score Severity   1-4 Minimal Depression   5-9 Mild Depression   10-14 Moderate Depression   15-19 Moderately Severe Depression   20-27 Severe Depression    Mixed hyperlipidemia  Reports that he has continue to take Fenofibrate 120 mg daily. Denies any side effects at this time. Reports he is intermittently exercising. States he attempts to eat healthy, but has difficulty being consistent. Reports he has decreased his alcohol consumption due to not having time to drink because of being exhausted when coming home from work.    Lab Results   Component Value Date/Time    CHOLSTRLTOT 238 (H) 05/07/2021 08:14 AM    LDL see below 05/07/2021 08:14 AM    HDL 26 (A) 05/07/2021 08:14 AM    TRIGLYCERIDE 936 (H) 05/07/2021 08:14 AM       Lab Results   Component Value Date/Time    ALKPHOSPHAT 88 05/07/2021 08:14 AM    ASTSGOT 34 05/07/2021 08:14 AM    ALTSGPT 58 (H) 05/07/2021 08:14 AM    TBILIRUBIN 0.3 05/07/2021 08:14 AM          Diabetes mellitus (HCC)  States he has not taken Trulicity for about a month due to running out of medication. States he has continued to take Metformin as prescribed. Denies any side effects at this time. Denies checking glucose at home. See Mixed hyperlipidemia HPI for lifestyle. States he has been working on increasing water intake.     5/7/2021:    Sodium 135 - 145 mmol/L 138    Potassium 3.6 - 5.5 mmol/L 4.7    Chloride 96 - 112 mmol/L 107    Co2 20 - 33 mmol/L 23    Anion Gap 7.0 - 16.0 8.0    Glucose 65 - 99 mg/dL 215High     Bun 8 - 22 mg/dL 16     Creatinine 0.50 - 1.40 mg/dL 1.04    Calcium 8.5 - 10.5 mg/dL 9.3    AST(SGOT) 12 - 45 U/L 34    ALT(SGPT) 2 - 50 U/L 58High     Alkaline Phosphatase 30 - 99 U/L 88    Total Bilirubin 0.1 - 1.5 mg/dL 0.3    Albumin 3.2 - 4.9 g/dL 4.4    Total Protein 6.0 - 8.2 g/dL 7.1    Globulin 1.9 - 3.5 g/dL 2.7    A-G Ratio g/dL 1.6    Resulting Agency  [facility]     5/7/2021 A1c: 9.5%    Creatinine, Urine mg/dL 194.37  96.30    Microalbumin, Urine Random mg/dL 6.2  1.1    Micro Alb Creat Ratio 0 - 30 mg/g 32High   11      History reviewed. No pertinent past medical history.    Social History     Tobacco Use   • Smoking status: Former Smoker     Packs/day: 0.00     Types: Cigarettes     Quit date: 12/1/2017     Years since quitting: 3.4   • Smokeless tobacco: Former User   • Tobacco comment: States that he quit in 2017, previous he would smoke when he was stressed.   Vaping Use   • Vaping Use: Never used   Substance Use Topics   • Alcohol use: Yes     Alcohol/week: 1.8 oz     Types: 3 Standard drinks or equivalent per week   • Drug use: Yes     Types: Marijuana, Inhaled     Comment: occasional     Current Outpatient Medications Ordered in Epic   Medication Sig Dispense Refill   • Fenofibrate 120 MG Tab Take 1 tablet by mouth every day. 90 tablet 1   • metformin (GLUCOPHAGE) 1000 MG tablet Take 1 tablet by mouth 2 times a day with meals for 90 days. 180 tablet 3   • Dulaglutide (TRULICITY) 0.75 MG/0.5ML Solution Pen-injector Inject 0.5 mL under the skin every 7 days. 4 Each 3   • atorvastatin (LIPITOR) 10 MG Tab Take 1 tablet by mouth every evening. 90 tablet 1   • Lancets Lancets order: Lancets for Accucheck Laura meter. Sig: use in am for FBS and prn ssx high or low sugar. #100 RF x 3 100 Each 3   • Alcohol Swabs ([employer] PREP) 70 % Pads 1 Package by Other route 3 times a week. 100 Each 0   • ACCU-CHEK LAURA PLUS strip Check FBS and 2 hours post meal glucose 3-4 times a week. 100 Strip 1   • Coenzyme Q10 (COQ10 PO) Take  by mouth.     •  "Ascorbic Acid (VITAMIN C) 1000 MG Tab Take  by mouth.     • Blood Glucose Monitoring Suppl (ACCU-CHEK BERENICE PLUS) w/Device Kit      • Multiple Vitamins-Minerals (MULTIVITAMIN ADULT PO) Take  by mouth.     • Calcium Polycarbophil (FIBER-LAX PO) Take  by mouth.     • Omega-3 Fatty Acids (FISH OIL) 1000 MG Cap capsule Take 1,000 mg by mouth every day.     • Probiotic Product (PROBIOTIC DAILY PO) Take  by mouth.     • IBUPROFEN PO Take  by mouth.       No current Epic-ordered facility-administered medications on file.     Allergies:  Codeine    Health Maintenance: Completed.    ROS:  Constitutional: Denies fever, chills, night sweats, weight loss/gain  or malaise/fatigue.   HENT: Denies nasal congestion, sore throat, hearing loss, enlarged thyroid, or difficulty swallowing.    Eyes: Denies changes in vision, pain. Wears corrective wear.   Respiratory: Denies cough, SOB at rest or activity.    Cardiovascular: Denies tachycardia, chest pain, palpitations, or  leg swelling.   Gastrointestinal: Denies N/V/C/D, abdominal pain, loss appetite, reflux, or hematochezia.  Genitourinary: Denies difficulty voiding, dysuria, nocturia, or hematuria.   Skin: Negative for rash or worrisome moles.   Neurological: Negative for dizziness, focal weakness and headaches.   Endo/Heme/Allergies: Denies bruise/bleed easily, allergies.   Psychiatric/Behavioral: Denies depression, nervous/anxious, difficulty sleeping.  MSK: History of chronic lower back pain and neck pain.    Objective:   Exam:  /84 (BP Location: Left arm, Patient Position: Sitting, BP Cuff Size: Adult)   Pulse 90   Temp 36.4 °C (97.6 °F) (Temporal)   Resp 12   Ht 1.753 m (5' 9\")   Wt 94.3 kg (208 lb)   SpO2 95%   BMI 30.72 kg/m²  Body mass index is 30.72 kg/m².    General: Normal appearing. No distress.  HEENT: Normocephalic. Eyes conjunctiva clear lids without ptosis, PERRLA, ears normal shape and contour. Oral, ears, and nasal examine deferred due to current " COVID-19 outbreak, no acute concerns. Patient wore a mask during visit.  Neck: Supple without JVD or abnormal masses. Small soft mobile thyroid palpated, no nodules palpated, non-tender.  Pulmonary: Clear to ausculation.  Normal effort. No rales, ronchi, or wheezing.  Cardiovascular: Regular rate and rhythm without murmur. Pedal and radial pulses are intact and equal bilaterally.  Abdomen: Soft, nontender, nondistended. Normal bowel sounds. Liver and spleen are not palpable  Neurologic: Grossly non-focal.  Lymph: No cervical or supraclavicular lymph nodes are palpable  Skin: Warm and dry. No obvious lesions.  Monofilament testing with a 10 gram force: sensation intact: intact bilaterally  Visual Inspection: Feet without maceration, ulcers, fissures.  Pedal pulses: intact bilaterally  Musculoskeletal: Normal gait. No extremity cyanosis, clubbing, or edema. DTR+2  Psych: Normal mood and affect. Alert and oriented x3. Judgment and insight is normal.    Assessment & Plan:   1. Mixed hyperlipidemia  This is a chronic uncontrolled condition. Reviewed recent labs with patient, verbalized understanding. Discussed needing to add 2nd agent due to uncontrolled cholesterol. Instructed to start Atorvastatin 10 mg daily in the evening. Discussed possible side effects of medication and the importance of not eating or drinking grapefruit juice while on statin therapy, verbalized understanding. Instructed to notify PCP if he experiences any associated symptoms, verbalized understanding and willingness. Will repeat lipid in 3 months. Discussed with patient if continues to be elevated will refer to lipid clinic, verbalized understanding. Discussed the importance of lifestyle modification as continued POC to control lipid panel. Discussed the importance of controlled to decrease pancreatitis, as well as, decrease risk of CVD and stroke risk, verbalized understanding.     -     Fenofibrate 120 MG Tab; Take 1 tablet by mouth every  day.  -     atorvastatin (LIPITOR) 10 MG Tab; Take 1 tablet by mouth every evening.    2. Type 2 diabetes mellitus with other specified complication, without long-term current use of insulin (HCC)  This is a chronic uncontrolled condition. Reviewed recent labs with patient, verbalized understanding. Instructed to resume Trulicity as previously prescribed. Instructed to notice PCP when her needs refills to decrease disruption in POC, verbalized understanding. Instructed to continue to Metformin as previously prescribed. Discussed placing new referral to HIP per patient request to reinforce lifestyle modification.  Encouraged accumulation of 150 minutes or more of moderate-intensity activity each week as tolerated. Discussed a healthy diet that is low in fat, sodium, and cholesterol, such as the mediterranean diet that is typically high in fruits, vegetables, whole grains, beans, nuts, and seeds, includes olive oil as an important source of monounsaturated fat and/or DASH diet. Will repeat A1c in 3 months and ACR in 6 months.     -     metformin (GLUCOPHAGE) 1000 MG tablet; Take 1 tablet by mouth 2 times a day with meals for 90 days.  -     Dulaglutide (TRULICITY) 0.75 MG/0.5ML Solution Pen-injector; Inject 0.5 mL under the skin every 7 days.    Return in about 3 months (around 8/13/2021).    Please note that this dictation was created using voice recognition software. I have made every reasonable attempt to correct obvious errors, but I expect that there are errors of grammar and possibly content that I did not discover before finalizing the note.

## 2021-05-18 DIAGNOSIS — Z30.09 VASECTOMY EVALUATION: ICD-10-CM

## 2021-05-18 NOTE — ASSESSMENT & PLAN NOTE
Reports that he has continue to take Fenofibrate 120 mg daily. Denies any side effects at this time. Reports he is intermittently exercising. States he attempts to eat healthy, but has difficulty being consistent. Reports he has decreased his alcohol consumption due to not having time to drink because of being exhausted when coming home from work.    Lab Results   Component Value Date/Time    CHOLSTRLTOT 238 (H) 05/07/2021 08:14 AM    LDL see below 05/07/2021 08:14 AM    HDL 26 (A) 05/07/2021 08:14 AM    TRIGLYCERIDE 936 (H) 05/07/2021 08:14 AM       Lab Results   Component Value Date/Time    ALKPHOSPHAT 88 05/07/2021 08:14 AM    ASTSGOT 34 05/07/2021 08:14 AM    ALTSGPT 58 (H) 05/07/2021 08:14 AM    TBILIRUBIN 0.3 05/07/2021 08:14 AM

## 2021-05-18 NOTE — ASSESSMENT & PLAN NOTE
[facility] he has not taken Trulicity for about a month due to running out of medication. [facility] he has continued to take Metformin as prescribed. Denies any side effects at this time. Denies checking glucose at home. See Mixed hyperlipidemia HPI for lifestyle. [facility] he has been working on increasing water intake.     5/7/2021:    Sodium 135 - 145 mmol/L 138    Potassium 3.6 - 5.5 mmol/L 4.7    Chloride 96 - 112 mmol/L 107    Co2 20 - 33 mmol/L 23    Anion Gap 7.0 - 16.0 8.0    Glucose 65 - 99 mg/dL 215High     Bun 8 - 22 mg/dL 16    Creatinine 0.50 - 1.40 mg/dL 1.04    Calcium 8.5 - 10.5 mg/dL 9.3    AST(SGOT) 12 - 45 U/L 34    ALT(SGPT) 2 - 50 U/L 58High     Alkaline Phosphatase 30 - 99 U/L 88    Total Bilirubin 0.1 - 1.5 mg/dL 0.3    Albumin 3.2 - 4.9 g/dL 4.4    Total Protein 6.0 - 8.2 g/dL 7.1    Globulin 1.9 - 3.5 g/dL 2.7    A-G Ratio g/dL 1.6    Resulting Agency  [facility]     5/7/2021 A1c: 9.5%    Creatinine, Urine mg/dL 194.37  96.30    Microalbumin, Urine Random mg/dL 6.2  1.1    Micro Alb Creat Ratio 0 - 30 mg/g 32High   11

## 2021-07-31 DIAGNOSIS — E78.2 MIXED HYPERLIPIDEMIA: ICD-10-CM

## 2021-08-02 RX ORDER — ATORVASTATIN CALCIUM 10 MG/1
10 TABLET, FILM COATED ORAL EVERY EVENING
Qty: 90 TABLET | Refills: 1 | Status: SHIPPED | OUTPATIENT
Start: 2021-08-02 | End: 2022-01-31

## 2021-08-09 DIAGNOSIS — E11.69 TYPE 2 DIABETES MELLITUS WITH OTHER SPECIFIED COMPLICATION, WITHOUT LONG-TERM CURRENT USE OF INSULIN (HCC): ICD-10-CM

## 2021-08-09 DIAGNOSIS — E78.2 MIXED HYPERLIPIDEMIA: ICD-10-CM

## 2021-08-21 ENCOUNTER — HOSPITAL ENCOUNTER (OUTPATIENT)
Dept: LAB | Facility: MEDICAL CENTER | Age: 46
End: 2021-08-21
Attending: NURSE PRACTITIONER
Payer: COMMERCIAL

## 2021-08-21 DIAGNOSIS — E11.69 TYPE 2 DIABETES MELLITUS WITH OTHER SPECIFIED COMPLICATION, WITHOUT LONG-TERM CURRENT USE OF INSULIN (HCC): ICD-10-CM

## 2021-08-21 DIAGNOSIS — E78.2 MIXED HYPERLIPIDEMIA: ICD-10-CM

## 2021-08-21 LAB
ALBUMIN SERPL BCP-MCNC: 4.7 G/DL (ref 3.2–4.9)
ALBUMIN/GLOB SERPL: 1.7 G/DL
ALP SERPL-CCNC: 76 U/L (ref 30–99)
ALT SERPL-CCNC: 57 U/L (ref 2–50)
ANION GAP SERPL CALC-SCNC: 12 MMOL/L (ref 7–16)
AST SERPL-CCNC: 25 U/L (ref 12–45)
BILIRUB SERPL-MCNC: 0.5 MG/DL (ref 0.1–1.5)
BUN SERPL-MCNC: 16 MG/DL (ref 8–22)
CALCIUM SERPL-MCNC: 9.4 MG/DL (ref 8.5–10.5)
CHLORIDE SERPL-SCNC: 99 MMOL/L (ref 96–112)
CHOLEST SERPL-MCNC: 137 MG/DL (ref 100–199)
CO2 SERPL-SCNC: 22 MMOL/L (ref 20–33)
CREAT SERPL-MCNC: 0.91 MG/DL (ref 0.5–1.4)
EST. AVERAGE GLUCOSE BLD GHB EST-MCNC: 194 MG/DL
FASTING STATUS PATIENT QL REPORTED: NORMAL
GLOBULIN SER CALC-MCNC: 2.7 G/DL (ref 1.9–3.5)
GLUCOSE SERPL-MCNC: 167 MG/DL (ref 65–99)
HBA1C MFR BLD: 8.4 % (ref 4–5.6)
HDLC SERPL-MCNC: 33 MG/DL
LDLC SERPL CALC-MCNC: 58 MG/DL
POTASSIUM SERPL-SCNC: 4.4 MMOL/L (ref 3.6–5.5)
PROT SERPL-MCNC: 7.4 G/DL (ref 6–8.2)
SODIUM SERPL-SCNC: 133 MMOL/L (ref 135–145)
TRIGL SERPL-MCNC: 229 MG/DL (ref 0–149)

## 2021-08-21 PROCEDURE — 36415 COLL VENOUS BLD VENIPUNCTURE: CPT

## 2021-08-21 PROCEDURE — 80061 LIPID PANEL: CPT

## 2021-08-21 PROCEDURE — 80053 COMPREHEN METABOLIC PANEL: CPT

## 2021-08-21 PROCEDURE — 83036 HEMOGLOBIN GLYCOSYLATED A1C: CPT

## 2021-08-26 ENCOUNTER — OFFICE VISIT (OUTPATIENT)
Dept: MEDICAL GROUP | Facility: IMAGING CENTER | Age: 46
End: 2021-08-26
Payer: COMMERCIAL

## 2021-08-26 VITALS
WEIGHT: 205 LBS | SYSTOLIC BLOOD PRESSURE: 118 MMHG | BODY MASS INDEX: 30.36 KG/M2 | HEART RATE: 68 BPM | RESPIRATION RATE: 12 BRPM | TEMPERATURE: 98.1 F | DIASTOLIC BLOOD PRESSURE: 78 MMHG | HEIGHT: 69 IN | OXYGEN SATURATION: 98 %

## 2021-08-26 DIAGNOSIS — F43.9 SITUATIONAL STRESS: ICD-10-CM

## 2021-08-26 DIAGNOSIS — E11.69 TYPE 2 DIABETES MELLITUS WITH OTHER SPECIFIED COMPLICATION, WITHOUT LONG-TERM CURRENT USE OF INSULIN (HCC): ICD-10-CM

## 2021-08-26 DIAGNOSIS — E78.2 MIXED HYPERLIPIDEMIA: ICD-10-CM

## 2021-08-26 PROCEDURE — 99214 OFFICE O/P EST MOD 30 MIN: CPT | Performed by: NURSE PRACTITIONER

## 2021-08-26 ASSESSMENT — FIBROSIS 4 INDEX: FIB4 SCORE: 0.89

## 2021-08-26 ASSESSMENT — PAIN SCALES - GENERAL: PAINLEVEL: NO PAIN

## 2021-08-26 NOTE — PROGRESS NOTES
Subjective:   CC: Lab Results    HPI:   Diogo presents today to discuss:    Mixed hyperlipidemia  Reports that he continues to attempt to improve overall diet.  States due to girlfriend having gastric sleeve they have been working together to improve overall health.  Reports that he continues to take atorvastatin 10 mg and fenofibrate 120 mg daily.  Denies any side effects.    Lab Results   Component Value Date/Time    CHOLSTRLTOT 137 08/21/2021 11:40 AM    LDL 58 08/21/2021 11:40 AM    HDL 33 (A) 08/21/2021 11:40 AM    TRIGLYCERIDE 229 (H) 08/21/2021 11:40 AM       Lab Results   Component Value Date/Time    ALKPHOSPHAT 76 08/21/2021 11:40 AM    ASTSGOT 25 08/21/2021 11:40 AM    ALTSGPT 57 (H) 08/21/2021 11:40 AM    TBILIRUBIN 0.5 08/21/2021 11:40 AM          Diabetes mellitus (HCC)  Reports that he continues to take Metformin 1000 mg twice daily with food.  Denies any side effects to medication.  Reports that he also takes Trulicity 0.75 mg every 7 days.  States that he does intermittently forget to give self injection.  States that he attempts to get injections on Saturdays.  Denies any side effects.  Seeing mixed hyperlipidemia HPI for physical activity and diet.  States overall he would like assistance to lose weight.  Reports that he has attempted to contact HIP program with no results.    Lab Results   Component Value Date/Time    SODIUM 133 (L) 08/21/2021 11:40 AM    POTASSIUM 4.4 08/21/2021 11:40 AM    CHLORIDE 99 08/21/2021 11:40 AM    CO2 22 08/21/2021 11:40 AM    GLUCOSE 167 (H) 08/21/2021 11:40 AM    BUN 16 08/21/2021 11:40 AM    CREATININE 0.91 08/21/2021 11:40 AM      8/21/2021 A1c: 8.4%    Reports that he would like a referral to behavioral health due to ongoing work stress.  States that he feels he would benefit from talking to a counselor to help process ongoing stress.  Denies any SI/HI at this time.    History reviewed. No pertinent past medical history.    Social History     Tobacco Use   •  Smoking status: Former Smoker     Packs/day: 0.00     Types: Cigarettes     Quit date: 12/1/2017     Years since quitting: 3.7   • Smokeless tobacco: Former User   • Tobacco comment: States that he quit in 2017, previous he would smoke when he was stressed.   Vaping Use   • Vaping Use: Never used   Substance Use Topics   • Alcohol use: Yes     Alcohol/week: 1.8 oz     Types: 3 Standard drinks or equivalent per week     Comment: occasionally    • Drug use: Yes     Types: Marijuana, Inhaled     Comment: occasional     Current Outpatient Medications Ordered in Epic   Medication Sig Dispense Refill   • Dulaglutide 1.5 MG/0.5ML Solution Pen-injector Inject 0.5 mL under the skin every 7 days. 2.24 mL 4   • atorvastatin (LIPITOR) 10 MG Tab Take 1 tablet by mouth every evening. 90 tablet 1   • Fenofibrate 120 MG Tab Take 1 tablet by mouth every day. 90 tablet 1   • metformin (GLUCOPHAGE) 1000 MG tablet Take 1 tablet by mouth 2 times a day with meals for 90 days. 180 tablet 1   • Lancets Lancets order: Lancets for Accucheck Laura meter. Sig: use in am for FBS and prn ssx high or low sugar. #100 RF x 3 100 Each 3   • Alcohol Swabs ([employer]S PREP) 70 % Pads 1 Package by Other route 3 times a week. 100 Each 0   • ACCU-CHEK LAURA PLUS strip Check FBS and 2 hours post meal glucose 3-4 times a week. 100 Strip 1   • Coenzyme Q10 (COQ10 PO) Take  by mouth.     • Ascorbic Acid (VITAMIN C) 1000 MG Tab Take  by mouth.     • Blood Glucose Monitoring Suppl (ACCU-CHEK LAURA PLUS) w/Device Kit      • Multiple Vitamins-Minerals (MULTIVITAMIN ADULT PO) Take  by mouth.     • Calcium Polycarbophil (FIBER-LAX PO) Take  by mouth.     • Omega-3 Fatty Acids (FISH OIL) 1000 MG Cap capsule Take 1,000 mg by mouth every day.     • Probiotic Product (PROBIOTIC DAILY PO) Take  by mouth.     • IBUPROFEN PO Take  by mouth.       No current Epic-ordered facility-administered medications on file.     Allergies:  Codeine    Health Maintenance:  "Completed.    ROS:  Constitutional: Denies fever, chills, night sweats, weight loss/gain  or malaise/fatigue.   HENT: Denies nasal congestion, sore throat, hearing loss, enlarged thyroid, or difficulty swallowing.    Eyes: Denies changes in vision, pain. Wears corrective wear.   Respiratory: Denies cough, SOB at rest or activity.    Cardiovascular: Denies tachycardia, chest pain, palpitations, or  leg swelling.   Gastrointestinal: Denies N/V/C/D, abdominal pain, loss appetite, reflux, or hematochezia.  Genitourinary: Denies difficulty voiding, dysuria, nocturia, or hematuria.   Skin: Negative for rash or worrisome moles.   Neurological: Negative for dizziness, focal weakness and headaches.   Endo/Heme/Allergies: Denies bruise/bleed easily, allergies.   Psychiatric/Behavioral: Denies depression, nervous/anxious, difficulty sleeping.  MSK: History of chronic lower back pain and neck pain.    Objective:   Exam:  /78 (BP Location: Left arm, Patient Position: Sitting, BP Cuff Size: Adult)   Pulse 68   Temp 36.7 °C (98.1 °F) (Temporal)   Resp 12   Ht 1.753 m (5' 9\")   Wt 93 kg (205 lb)   SpO2 98%   BMI 30.27 kg/m²  Body mass index is 30.27 kg/m².  General: Normal appearing. No distress.  HEENT: Normocephalic. Eyes conjunctiva clear lids without ptosis, PERRLA, ears normal shape and contour. Oral, ears, and nasal examine deferred due to current COVID-19 outbreak, no acute concerns. Patient wore a mask during visit.  Neck: Trachea midline, no masses, no thyromegaly.  Pulmonary:  Unlabored respiratory effort, no cough.  Neurologic: Grossly non-focal.  Skin: Warm and dry. No obvious lesions.  Musculoskeletal: Normal gait. No extremity cyanosis, clubbing, or edema. DTR+2  Psych: Normal mood and affect. Alert and oriented x3. Judgment and insight is normal.    Assessment & Plan:   1. Situational stress  This is a stable condition.  Discussed with patient the benefit of counseling related to overall work stress and " life stressors.  Patient encouraged to reach out to [employer] due to being free for all [place] residents at this time while waiting for behavioral health referral to go through.  Discussed with patient importance of physical activity, healthy eating, and mindfulness exercises to manage ongoing stress.    - REFERRAL TO BEHAVIORAL HEALTH    2. Mixed hyperlipidemia  This is a stable condition. Reviewed recent labs with patient, verbalized understanding. Discussed at this time lifestyle modifications and ongoing medication regimen are the current recommendations for his cholesterol levels and age to prevent CVD and/or decrease risk of stroke in the next ten years, and over his lifetime, verbalized understanding.  Encouraged accumulation of 150 minutes or more of moderate-intensity activity each week as tolerated. Discussed a healthy diet that is low in fat, sodium, and cholesterol, such as the mediterranean diet that is typically high in fruits, vegetables, whole grains, beans, nuts, and seeds, includes olive oil as an important source of monounsaturated fat, DASH diet, and/or also consider a plant-based diet. Discussed increasing fiber to a daily total of 25-30 grams as tolerated for overall health benefit.  Discussed keeping a 7-day log of average fiber intake before increasing fiber. Discussed seeking fiber from his daily food intake, discussed different foods that are higher in fiber. Discussed with patient as he increases his fiber he can supplement fiber intake with OTC psyllium husk as tolerated, verbalized understanding. Instructed to increase fiber by 2 grams every 5 days as tolerated. Discussed possible GI upset as he increases his fiber, verbalized understanding.    - REFERRAL TO NUTRITION SERVICES    3. Type 2 diabetes mellitus with other specified complication, without long-term current use of insulin (HCC)  This is a chronic stable condition.  Patient instructed to continue taking Metformin 1000 mg twice  daily.  Discussed increasing Trulicity dose at this time for further control of diabetes, verbalized understanding.  Reinforced giving self Trulicity once a week.  Discussed with patient setting alarms on cell phone so he does not forget to give Trulicity, verbalized understanding willingness.  Reviewed recent A1c results with patient, verbalized understanding.  Instructed to continue lifestyle modifications discussed in AP #2.  Discussed with patient if he is interested and weight loss management using medications to reschedule appointment and will discuss at that time.  Discussed referral to [facility] wellness program for further nutritional counseling, verbalized understanding willingness to participate in referral.    - Dulaglutide 1.5 MG/0.5ML Solution Pen-injector; Inject 0.5 mL under the skin every 7 days.  Dispense: 2.24 mL; Refill: 4  - REFERRAL TO NUTRITION SERVICES    Return in about 3 months (around 11/26/2021).    Please note that this dictation was created using voice recognition software. I have made every reasonable attempt to correct obvious errors, but I expect that there are errors of grammar and possibly content that I did not discover before finalizing the note.

## 2021-08-29 NOTE — ASSESSMENT & PLAN NOTE
Reports that he continues to attempt to improve overall diet.  States due to girlfriend having gastric sleeve they have been working together to improve overall health.  Reports that he continues to take atorvastatin 10 mg and fenofibrate 120 mg daily.  Denies any side effects.    Lab Results   Component Value Date/Time    CHOLSTRLTOT 137 08/21/2021 11:40 AM    LDL 58 08/21/2021 11:40 AM    HDL 33 (A) 08/21/2021 11:40 AM    TRIGLYCERIDE 229 (H) 08/21/2021 11:40 AM       Lab Results   Component Value Date/Time    ALKPHOSPHAT 76 08/21/2021 11:40 AM    ASTSGOT 25 08/21/2021 11:40 AM    ALTSGPT 57 (H) 08/21/2021 11:40 AM    TBILIRUBIN 0.5 08/21/2021 11:40 AM

## 2021-08-29 NOTE — ASSESSMENT & PLAN NOTE
Reports that he continues to take Metformin 1000 mg twice daily with food.  Denies any side effects to medication.  Reports that he also takes Trulicity 0.75 mg every 7 days.  States that he does intermittently forget to give self injection.  States that he attempts to get injections on Saturdays.  Denies any side effects.  Seeing mixed hyperlipidemia HPI for physical activity and diet.  States overall he would like assistance to lose weight.  Reports that he has attempted to contact HIP program with no results.    Lab Results   Component Value Date/Time    SODIUM 133 (L) 08/21/2021 11:40 AM    POTASSIUM 4.4 08/21/2021 11:40 AM    CHLORIDE 99 08/21/2021 11:40 AM    CO2 22 08/21/2021 11:40 AM    GLUCOSE 167 (H) 08/21/2021 11:40 AM    BUN 16 08/21/2021 11:40 AM    CREATININE 0.91 08/21/2021 11:40 AM      8/21/2021 A1c: 8.4%

## 2021-09-16 ENCOUNTER — TELEPHONE (OUTPATIENT)
Dept: MEDICAL GROUP | Facility: IMAGING CENTER | Age: 46
End: 2021-09-16

## 2021-09-16 NOTE — TELEPHONE ENCOUNTER
Received message from patient requesting call back.    Called and spoke with patient. He states he started having symptoms Sunday night. He states he started having a runny nose, sinus congestion. He states yesterday he started having chills and a slight fever. He states he spoke with someone at our office regarding getting a rapid test. He states he was recommended to get a home test which he did. He said it came back positive. He also feels like he is loosing his taste and smell today.   He states he has been excluded from work and is recommended to call HR. He states they are unsure if his home test will be sufficient enough. Patient also requesting to speak with Tiff regarding symptoms.     Scheduled patient for virtual tomorrow.

## 2021-09-17 ENCOUNTER — NON-PROVIDER VISIT (OUTPATIENT)
Dept: MEDICAL GROUP | Facility: IMAGING CENTER | Age: 46
End: 2021-09-17
Payer: COMMERCIAL

## 2021-09-17 ENCOUNTER — HOSPITAL ENCOUNTER (OUTPATIENT)
Facility: MEDICAL CENTER | Age: 46
End: 2021-09-17
Attending: NURSE PRACTITIONER
Payer: COMMERCIAL

## 2021-09-17 ENCOUNTER — TELEMEDICINE (OUTPATIENT)
Dept: MEDICAL GROUP | Facility: IMAGING CENTER | Age: 46
End: 2021-09-17
Payer: COMMERCIAL

## 2021-09-17 VITALS — BODY MASS INDEX: 30.36 KG/M2 | HEIGHT: 69 IN | TEMPERATURE: 97.7 F | WEIGHT: 205 LBS

## 2021-09-17 DIAGNOSIS — R09.81 NASAL CONGESTION: ICD-10-CM

## 2021-09-17 DIAGNOSIS — U07.1 LAB TEST POSITIVE FOR DETECTION OF COVID-19 VIRUS: ICD-10-CM

## 2021-09-17 DIAGNOSIS — E11.69 TYPE 2 DIABETES MELLITUS WITH OTHER SPECIFIED COMPLICATION, WITHOUT LONG-TERM CURRENT USE OF INSULIN (HCC): ICD-10-CM

## 2021-09-17 LAB — COVID ORDER STATUS COVID19: NORMAL

## 2021-09-17 PROCEDURE — 99214 OFFICE O/P EST MOD 30 MIN: CPT | Mod: 95,CR,CS | Performed by: NURSE PRACTITIONER

## 2021-09-17 PROCEDURE — U0005 INFEC AGEN DETEC AMPLI PROBE: HCPCS

## 2021-09-17 PROCEDURE — 99999 PR NO CHARGE: CPT | Performed by: NURSE PRACTITIONER

## 2021-09-17 PROCEDURE — U0003 INFECTIOUS AGENT DETECTION BY NUCLEIC ACID (DNA OR RNA); SEVERE ACUTE RESPIRATORY SYNDROME CORONAVIRUS 2 (SARS-COV-2) (CORONAVIRUS DISEASE [COVID-19]), AMPLIFIED PROBE TECHNIQUE, MAKING USE OF HIGH THROUGHPUT TECHNOLOGIES AS DESCRIBED BY CMS-2020-01-R: HCPCS

## 2021-09-17 ASSESSMENT — FIBROSIS 4 INDEX: FIB4 SCORE: 0.89

## 2021-09-17 NOTE — PROGRESS NOTES
Virtual Visit: Established Patient   This visit was conducted via Zoom using secure and encrypted videoconferencing technology.   The patient was in a private location in the state of [place].    The patient's identity was confirmed and verbal consent was obtained for this virtual visit.  Patient is aware that this is a billable encounter.  Subjective:   CC:   Chief Complaint   Patient presents with   • Nasal Congestion     x 4 days, at home covid positive test    • Chills     Diogo Keen is a 46 y.o. male presenting for evaluation and management of:    Reports that he has completed a Covid test at home.  States that it was positive.  Reports that he went to school on Monday and did not feel well and then on Tuesday he started feeling more sick.  States that he stayed home since then.  States that he has been experiencing nasal congestion and chills, as well as, intermittent decreased taste and smell.  Denies any knowing any direct Covid positive contacts.  States that he does believe that there are active cases of Covid at his school but believes that children and faculty have been contained and dismissed until 10-14 day quarantine.  Denies any fever, sore throat, cough, shortness of breath, chest pain, rash, and/or N/V/C/D.  Reports that he has noticed a decrease appetite but has been able to maintain fluids and eating.  States that he is taking over-the-counter [place]ge with minimal improvement of overall nasal congestion.  Reports that he received both Covid vaccines.  States that he actually was planning on getting his third vaccine due to his ongoing diabetes but now will wait due to Covid test.  States that he is not sure if his employer will accept his at home Covid test and possibly will need a in clinic Covid test to verify he has Covid.  States that he is social isolating at this time, but his partner who lives with him boss told her to go to work despite him being positive.  States that at this  time she has no symptoms.    ROS   Constitutional: Denies fever, night sweats, weight loss/gain and/or malaise/fatigue.  Chills, see HPI.  HENT: Denies sore throat, hearing loss, enlarged thyroid, or difficulty swallowing.  Nasal congestion and intermittent decreased taste and smell, see HPI.  Eyes: Denies changes in vision, pain. Wears corrective wear.   Respiratory: Denies cough, SOB at rest or activity.    Cardiovascular: Denies tachycardia, chest pain, palpitations, or  leg swelling.   Gastrointestinal: Denies N/V/C/D, abdominal pain, reflux, or hematochezia.  Decreased appetite, see HPI.  Genitourinary: Denies difficulty voiding, dysuria, nocturia, or hematuria.   Neurological: Negative for dizziness, focal weakness and headaches.     Current medicines (including changes today)  Current Outpatient Medications   Medication Sig Dispense Refill   • Dulaglutide 1.5 MG/0.5ML Solution Pen-injector Inject 0.5 mL under the skin every 7 days. 2.24 mL 4   • atorvastatin (LIPITOR) 10 MG Tab Take 1 tablet by mouth every evening. 90 tablet 1   • Fenofibrate 120 MG Tab Take 1 tablet by mouth every day. 90 tablet 1   • metformin (GLUCOPHAGE) 1000 MG tablet Take 1 tablet by mouth 2 times a day with meals for 90 days. 180 tablet 1   • Lancets Lancets order: Lancets for Accucheck Laura meter. Sig: use in am for FBS and prn ssx high or low sugar. #100 RF x 3 100 Each 3   • Alcohol Swabs ([employer] PREP) 70 % Pads 1 Package by Other route 3 times a week. 100 Each 0   • ACCU-CHEK LAURA PLUS strip Check FBS and 2 hours post meal glucose 3-4 times a week. 100 Strip 1   • Coenzyme Q10 (COQ10 PO) Take  by mouth.     • Ascorbic Acid (VITAMIN C) 1000 MG Tab Take  by mouth.     • Blood Glucose Monitoring Suppl (ACCU-CHEK LAURA PLUS) w/Device Kit      • Multiple Vitamins-Minerals (MULTIVITAMIN ADULT PO) Take  by mouth.     • Calcium Polycarbophil (FIBER-LAX PO) Take  by mouth.     • Omega-3 Fatty Acids (FISH OIL) 1000 MG Cap capsule Take 1,000  "mg by mouth every day.     • Probiotic Product (PROBIOTIC DAILY PO) Take  by mouth.     • IBUPROFEN PO Take  by mouth.       No current facility-administered medications for this visit.     Patient Active Problem List    Diagnosis Date Noted   • Diabetes mellitus (HCC) 01/19/2020   • Mixed hyperlipidemia 01/19/2020   • Elevated blood pressure reading 12/06/2019   • Chronic midline low back pain without sciatica 12/06/2019   • Neck pain 12/06/2019      Objective:   Temp 36.5 °C (97.7 °F)   Ht 1.753 m (5' 9\")   Wt 93 kg (205 lb)   BMI 30.27 kg/m²   Respiratory rate observed during visit: 12 bpm    Physical Exam:  Constitutional: Alert, no distress, well-groomed.  Skin: No rashes in visible areas.  Eye: Round. Conjunctiva clear, lids normal. No icterus.   ENMT: Lips pink without lesions, good dentition, moist mucous membranes. Phonation is nasal sounding.  Neck: No masses, no thyromegaly. Moves freely without pain.  Respiratory: Unlabored respiratory effort, no cough or audible wheeze  Psych: Alert and oriented x3, normal affect and mood.     Assessment and Plan:   1. Lab test positive for detection of COVID-19 virus  2. Nasal congestion  This is a stable condition. Discussed with patient illness is viral in nature, and antibiotics are not indicated at this time.  Encouraged to increase or maintain hydration, receive plenty of rest, and take tylenol (500 mg to 1000 mg every 6 hours as tolerated, not to exceed 4g in 24 hours) for discomfort as tolerated. Encouraged patient to wash hands regularly and avoid sick contacts while supporting immune system with Vitamin C 1000 mg, Zinc 50 mcg, and Vitamin D 1643-5153 IU daily, verbalized understanding.  Instructed patient to social isolate for 10 days from onset of illness. Discussed he will need to continue social isolate until symptoms improve and/or 24 hours from last fever without fever reducing medication, whichever is longer.  Verbalized understanding.  Discussed with " patient encouraging partner that he lives with to get Covid testing to see if she is positive.  Discussed with patient concerns that she is highly likely to have Covid and will also need to isolate to decrease exposure to others, verbalized understanding will discuss with partner about getting Covid testing.  Discussed with patient if his employer requires him to get Covid testing he needs to schedule MA/RN visit for curbside Covid testing, verbalized understanding and willingness.  Patient instructed to seek emergency services if his symptoms worsen and/or he has any concerns and is unable to manage his care at home.  Instructed to notify PCP if symptoms worsen will consider monoclonal antibodies at that time due to history of diabetes.  Due to symptoms being mild and diabetes controlled not considering therapy at this time, verbalized understanding and agrees with plan of care.    - COVID/SARS CoV-2 PCR; Future    3. Type 2 diabetes mellitus with other specified complication, without long-term current use of insulin (HCC)  This is a stable chronic condition.  Discussed with patient due to current viral illness that he is at increased risk for hyper and/or hypoglycemia, verbalized understanding.  Discussed with patient if he is not eating to not take Metformin at the appropriate times.  Discussed if he eats in the morning he has to take his Metformin but if he finds he is not eating throughout the day to skip the evening dose.  Discussed reverse pattern if he is not eating in the morning, verbalized understanding.  Discussed with patient that if he is tolerating his diet he is to continue to take medication.  Discussed with patient if he has any concerns to check blood sugar, verbalized understanding.  Discussed with patient that he is at higher risk for complications of Covid and if he has any concerns he is to seek emergency services.  Discussed with patient if he does not continue to have a mild case will  discuss monoclonal antibodies, verbalized understanding.  Discussed with patient that he will be eligible for third dose of Covid vaccine anywhere from 1 month to 3 months after recovering from Covid, verbalized understanding.    Follow-up: Return in about 2 weeks (around 10/1/2021), or sooner symptoms worsen and/or fail to improve.

## 2021-09-17 NOTE — PROGRESS NOTES
Diogo Keen is a 46 y.o. male here for a non-provider visit for COVID testing.    Clinic collect COVID order in system?: Yes    Patient tolerated specimen collection and no adverse effects were observed or reported: Yes

## 2021-09-18 LAB
SARS-COV-2 RNA RESP QL NAA+PROBE: DETECTED
SPECIMEN SOURCE: ABNORMAL

## 2021-09-21 NOTE — ASSESSMENT & PLAN NOTE
Reports that he has been able to continue medication at this time.  States that he is having difficulty with his last 2 Trulicity pens.  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  States that he has a decreased appetite, but he is able to eat and he continues to take medication.  Denies any hypoglycemic symptoms and/or hyperglycemic symptoms at this time.

## 2021-12-16 ENCOUNTER — HOSPITAL ENCOUNTER (OUTPATIENT)
Dept: LAB | Facility: MEDICAL CENTER | Age: 46
End: 2021-12-16
Attending: UROLOGY
Payer: COMMERCIAL

## 2021-12-16 LAB — TESTOST SERPL-MCNC: 271 NG/DL (ref 175–781)

## 2021-12-16 PROCEDURE — 84403 ASSAY OF TOTAL TESTOSTERONE: CPT

## 2021-12-16 PROCEDURE — 36415 COLL VENOUS BLD VENIPUNCTURE: CPT

## 2022-02-07 ENCOUNTER — OFFICE VISIT (OUTPATIENT)
Dept: MEDICAL GROUP | Facility: IMAGING CENTER | Age: 47
End: 2022-02-07
Payer: COMMERCIAL

## 2022-02-07 VITALS
DIASTOLIC BLOOD PRESSURE: 72 MMHG | BODY MASS INDEX: 30.36 KG/M2 | HEART RATE: 86 BPM | SYSTOLIC BLOOD PRESSURE: 110 MMHG | HEIGHT: 69 IN | TEMPERATURE: 98.8 F | OXYGEN SATURATION: 95 % | WEIGHT: 205 LBS

## 2022-02-07 DIAGNOSIS — Z13.29 SCREENING FOR THYROID DISORDER: ICD-10-CM

## 2022-02-07 DIAGNOSIS — E78.2 MIXED HYPERLIPIDEMIA: ICD-10-CM

## 2022-02-07 DIAGNOSIS — R14.0 BLOATING: ICD-10-CM

## 2022-02-07 DIAGNOSIS — Z78.9 ALCOHOL USE: ICD-10-CM

## 2022-02-07 DIAGNOSIS — M79.671 RIGHT FOOT PAIN: ICD-10-CM

## 2022-02-07 DIAGNOSIS — Z13.21 ENCOUNTER FOR VITAMIN DEFICIENCY SCREENING: ICD-10-CM

## 2022-02-07 DIAGNOSIS — R74.01 ELEVATED ALT MEASUREMENT: ICD-10-CM

## 2022-02-07 DIAGNOSIS — Z56.6 STRESS AT WORK: ICD-10-CM

## 2022-02-07 DIAGNOSIS — M95.4 STERNAL DEFORMITY: ICD-10-CM

## 2022-02-07 DIAGNOSIS — Z13.0 SCREENING FOR DEFICIENCY ANEMIA: ICD-10-CM

## 2022-02-07 DIAGNOSIS — E11.65 TYPE 2 DIABETES MELLITUS WITH HYPERGLYCEMIA, WITHOUT LONG-TERM CURRENT USE OF INSULIN (HCC): ICD-10-CM

## 2022-02-07 PROBLEM — R03.0 ELEVATED BLOOD PRESSURE READING: Status: RESOLVED | Noted: 2019-12-06 | Resolved: 2022-02-07

## 2022-02-07 PROBLEM — M54.2 NECK PAIN: Status: RESOLVED | Noted: 2019-12-06 | Resolved: 2022-02-07

## 2022-02-07 PROCEDURE — 99214 OFFICE O/P EST MOD 30 MIN: CPT | Performed by: PHYSICIAN ASSISTANT

## 2022-02-07 RX ORDER — SEMAGLUTIDE 1.34 MG/ML
0.25 INJECTION, SOLUTION SUBCUTANEOUS
Qty: 1.5 ML | Refills: 0 | Status: SHIPPED | OUTPATIENT
Start: 2022-02-07 | End: 2022-03-17 | Stop reason: SDUPTHER

## 2022-02-07 RX ORDER — TESTOSTERONE CYPIONATE 200 MG/ML
INJECTION, SOLUTION INTRAMUSCULAR
COMMUNITY
End: 2022-02-07

## 2022-02-07 SDOH — HEALTH STABILITY - MENTAL HEALTH: OTHER PHYSICAL AND MENTAL STRAIN RELATED TO WORK: Z56.6

## 2022-02-07 ASSESSMENT — PAIN SCALES - GENERAL: PAINLEVEL: NO PAIN

## 2022-02-07 ASSESSMENT — FIBROSIS 4 INDEX: FIB4 SCORE: 0.89

## 2022-02-08 NOTE — PATIENT INSTRUCTIONS
It was a pleasure meeting with you today at [facility]!    Your medical history and medications were reviewed today. If you have any prescription refill requests, please send them via [employer] or discuss with your provider at the start of your office visits. Please allow 3-5 business days for lab and testing review and you will be contacted via [employer] with those results, or if advised to make a follow up appointment regarding those results, then please do so.     Once resulted, your lab/test/imaging results will show up automatically in your MyChart. Please wait for my interpretation and recommendations prior to viewing your results to avoid any unnecessary confusion or misinterpretation. I will address all of the lab values that I interpret as abnormal and message you accordingly on your MyChart. I will always send you a message on your labs even if they are normal. If you do not hear back from me within 5 business days after getting your labs drawn, then please send me a message on [employer] so I can obtain your labs (especially if you went to an outside lab - [facility], [facility], etc). If you have any additional questions or concerns beyond my interpretation of your results, please make an appointment with me to discuss in further detail.    Please only use the [employer] messaging system for questions regarding your most recent appointment or if advised to use otherwise (glucose or blood pressure reporting). If you have any new problems or concerns, you must make an appointment to discuss. This includes any referral requests.     Thank you,    Jane Wells PA-C ([facility])  Physician Assistant Certified  [facility]

## 2022-02-08 NOTE — PROGRESS NOTES
Subjective:     CC:   Chief Complaint   Patient presents with   • Establish Care     doesnt check glucose at home   • Referral Needed     mental health, eye care for retinopathy, podiatrist   • Medication Management     switch from trulicity to ozemipic       HPI:   Diogo presents today to discuss:    Diabetes mellitus (HCC)  Chronic, on Metformin 1000 mg twice a day.  Patient states that he has not been able to get his Trulicity refilled.  He would like to try Ozempic.  Currently on statin and tolerating it well.  Requesting referral for ophthalmology and podiatry.  No neuropathy or vision changes.    Mixed hyperlipidemia  Chronic, on atorvastatin 10 mg and fenofibrate 120 mg daily.  Patient states his diet has not been very good over the past month.    Right foot pain  Patient admits to chronic right lateral foot pain.  No history of injury.  He states that the pain goes from the lateral midfoot to his pinky toe.  No pain in the ankle.  No swelling or skin changes.    Stress at work  Patient admits to stress at work and at home.  He is interested in the therapist referral.  He would like to meet somebody in person rather than virtually.      History reviewed. No pertinent past medical history.  Social History     Tobacco Use   • Smoking status: Former Smoker     Packs/day: 0.00     Types: Cigarettes     Quit date: 2017     Years since quittin.1   • Smokeless tobacco: Former User   • Tobacco comment: States that he quit in 2017, previous he would smoke when he was stressed.   Vaping Use   • Vaping Use: Never used   Substance Use Topics   • Alcohol use: Yes     Alcohol/week: 1.8 oz     Types: 3 Standard drinks or equivalent per week     Comment: occasionally    • Drug use: Yes     Types: Marijuana, Inhaled     Comment: occasional     Current Outpatient Medications Ordered in Epic   Medication Sig Dispense Refill   • metformin (GLUCOPHAGE) 1000 MG tablet Take 1 Tablet by mouth 2 (two) times a day.     •  "Semaglutide,0.25 or 0.5MG/DOS, (OZEMPIC, 0.25 OR 0.5 MG/DOSE,) 2 MG/1.5ML Solution Pen-injector Inject 0.25 mg under the skin every 7 days. 1.5 mL 0   • atorvastatin (LIPITOR) 10 MG Tab TAKE 1 TABLET BY MOUTH EVERY DAY IN THE EVENING 30 Tablet 0   • Fenofibrate 120 MG Tab TAKE 1 TABLET BY MOUTH EVERY DAY 30 Tablet 0   • Lancets Lancets order: Lancets for Accucheck Laura meter. Sig: use in am for FBS and prn ssx high or low sugar. #100 RF x 3 100 Each 3   • Alcohol Swabs ([employer]S PREP) 70 % Pads 1 Package by Other route 3 times a week. 100 Each 0   • ACCU-CHEK LAURA PLUS strip Check FBS and 2 hours post meal glucose 3-4 times a week. 100 Strip 1   • Coenzyme Q10 (COQ10 PO) Take  by mouth.     • Ascorbic Acid (VITAMIN C) 1000 MG Tab Take  by mouth.     • Blood Glucose Monitoring Suppl (ACCU-CHEK LAURA PLUS) w/Device Kit      • Multiple Vitamins-Minerals (MULTIVITAMIN ADULT PO) Take  by mouth.     • Calcium Polycarbophil (FIBER-LAX PO) Take  by mouth.     • Omega-3 Fatty Acids (FISH OIL) 1000 MG Cap capsule Take 1,000 mg by mouth every day.     • Probiotic Product (PROBIOTIC DAILY PO) Take  by mouth.     • IBUPROFEN PO Take  by mouth.       No current Epic-ordered facility-administered medications on file.     Codeine    Health Maintenance: Completed    ROS: see hpi  Gen: no fevers/chills  Pulm: no sob, no cough  CV: no chest pain, no palpitations, no edema  GI: no nausea/vomiting, no diarrhea  Skin: no rash    Objective:   Exam:  /72 (BP Location: Left arm, Patient Position: Sitting, BP Cuff Size: Adult)   Pulse 86   Temp 37.1 °C (98.8 °F) (Temporal)   Ht 1.753 m (5' 9\")   Wt 93 kg (205 lb)   SpO2 95%   BMI 30.27 kg/m²    Body mass index is 30.27 kg/m².    Gen: Alert and oriented, No apparent distress.  HEENT: Head atraumatic, normocephalic. Pupils equal and round.  Neck: Neck is supple without lymphadenopathy.   Lungs: Normal effort, CTA bilaterally, no wheezes, rhonchi, or rales  CV: Regular rate and rhythm. " No murmurs, rubs, or gallops.  ABD: +BS.  Hard nodule versus bony abnormality noted along epigastrium near xiphoid process.  Questionable sternal deformity versus soft tissue lesion?  Abdomen non-tender, mildly distended. No rebound, rigidity, or guarding.  Ext: No clubbing, cyanosis, edema.  Right foot nontender to palpation.  Full range of motion.  No skin changes or deformity.    Assessment & Plan:     46 y.o. male with the following -     1. Type 2 diabetes mellitus with hyperglycemia, without long-term current use of insulin (HCC)  Chronic, suboptimally controlled.  Will change Trulicity to semaglutide and titrate up as tolerated.  Refer to ophthalmology.    Diabetes recommendations:  -Follow an 1800 calorie ADA diet and aim to get about half of your calories from carbohydrates. For an 1800 calorie diet, that would be around 150-200 grams of carbs a day. Exercise as tolerated; ideally 150 minutes of cardiovascular exercise a week, or 20 minutes a day.   -Monitor blood sugars at home and keep a log book. Check your glucose fasting every morning, before dinner, and 2 hours after dinner (or as otherwise recommended at your appointment). Call if glucose <70 or >300. Please send weekly readings of your blood glucose through [employer].  -Please check your feet frequently for any open wounds/ulcers, signs of infection, or changes in sensation/neuropathy.  -Ensure that you see your ophthalmologist for your annual eye exam to assess for diabetic retinopathy.    - Referral for Retinal Screening Exam; Future  - Comp Metabolic Panel; Future  - HEMOGLOBIN A1C; Future  - Semaglutide,0.25 or 0.5MG/DOS, (OZEMPIC, 0.25 OR 0.5 MG/DOSE,) 2 MG/1.5ML Solution Pen-injector; Inject 0.25 mg under the skin every 7 days.  Dispense: 1.5 mL; Refill: 0    2. Stress at work  - Referral to Psychology    3. Right foot pain  - Referral to Podiatry  - DX-FOOT-COMPLETE 3+ RIGHT; Future  - URIC ACID; Future    4. Mixed hyperlipidemia  Chronic,  improved.  Rule out familial hypercholesterolemia.  Discussed risk of high cholesterol and triglycerides including increased risk of heart disease and pancreatitis.  - Lipid Profile; Future  - Referral to Genetic Research Studies    5. Elevated ALT measurement  - Comp Metabolic Panel; Future  - GAMMA GT (GGT); Future    6. Sternal deformity  - DX-CHEST-2 VIEWS; Future  - US-ABDOMEN COMPLETE SURVEY; Future    7. Bloating  - GAMMA GT (GGT); Future  - US-ABDOMEN COMPLETE SURVEY; Future    8. Alcohol use  Previous excessive alcohol use, patient states that he has been improving.  - GAMMA GT (GGT); Future  - US-ABDOMEN COMPLETE SURVEY; Future    9. Screening for deficiency anemia  - CBC WITH DIFFERENTIAL; Future  - VITAMIN B12; Future    10. Encounter for vitamin deficiency screening  - VITAMIN D,25 HYDROXY; Future  - VITAMIN B12; Future    11. Screening for thyroid disorder  - TSH WITH REFLEX TO FT4; Future    Return in about 1 month (around 3/7/2022) for Follow-up.     My total time spent caring for the patient on the day of the encounter was 31 minutes.   This does not include time spent on separately billable procedures/tests.      Jane Wells PA-C ([facility])  Physician Assistant Certified  [facility]    Please note that this dictation was created using voice recognition software. I have made every reasonable attempt to correct obvious errors, but I expect that there are errors of grammar and possibly content that I did not discover before finalizing the note.

## 2022-02-08 NOTE — ASSESSMENT & PLAN NOTE
Chronic, on atorvastatin 10 mg and fenofibrate 120 mg daily.  Patient states his diet has not been very good over the past month.

## 2022-02-08 NOTE — ASSESSMENT & PLAN NOTE
Patient admits to stress at work and at home.  He is interested in the therapist referral.  He would like to meet somebody in person rather than virtually.

## 2022-02-08 NOTE — ASSESSMENT & PLAN NOTE
Patient admits to chronic right lateral foot pain.  No history of injury.  He states that the pain goes from the lateral midfoot to his pinky toe.  No pain in the ankle.  No swelling or skin changes.

## 2022-02-08 NOTE — ASSESSMENT & PLAN NOTE
Chronic, on Metformin 1000 mg twice a day.  Patient states that he has not been able to get his Trulicity refilled.  He would like to try Ozempic.  Currently on statin and tolerating it well.  Requesting referral for ophthalmology and podiatry.  No neuropathy or vision changes.

## 2022-02-09 ENCOUNTER — RESEARCH ENCOUNTER (OUTPATIENT)
Dept: MEDICAL GROUP | Facility: PHYSICIAN GROUP | Age: 47
End: 2022-02-09

## 2022-02-09 DIAGNOSIS — Z00.6 RESEARCH STUDY PATIENT: ICD-10-CM

## 2022-02-19 ENCOUNTER — HOSPITAL ENCOUNTER (OUTPATIENT)
Dept: RADIOLOGY | Facility: MEDICAL CENTER | Age: 47
End: 2022-02-19
Attending: PHYSICIAN ASSISTANT
Payer: COMMERCIAL

## 2022-02-19 DIAGNOSIS — R14.0 BLOATING: ICD-10-CM

## 2022-02-19 DIAGNOSIS — M79.671 RIGHT FOOT PAIN: ICD-10-CM

## 2022-02-19 DIAGNOSIS — M95.4 STERNAL DEFORMITY: ICD-10-CM

## 2022-02-19 DIAGNOSIS — Z78.9 ALCOHOL USE: ICD-10-CM

## 2022-02-19 PROCEDURE — 73630 X-RAY EXAM OF FOOT: CPT | Mod: RT

## 2022-02-19 PROCEDURE — 71046 X-RAY EXAM CHEST 2 VIEWS: CPT

## 2022-02-19 PROCEDURE — 76700 US EXAM ABDOM COMPLETE: CPT

## 2022-03-07 ENCOUNTER — OFFICE VISIT (OUTPATIENT)
Dept: MEDICAL GROUP | Facility: IMAGING CENTER | Age: 47
End: 2022-03-07
Payer: COMMERCIAL

## 2022-03-07 VITALS
OXYGEN SATURATION: 96 % | BODY MASS INDEX: 30.66 KG/M2 | TEMPERATURE: 98.1 F | HEART RATE: 81 BPM | SYSTOLIC BLOOD PRESSURE: 122 MMHG | HEIGHT: 69 IN | DIASTOLIC BLOOD PRESSURE: 90 MMHG | WEIGHT: 207 LBS

## 2022-03-07 DIAGNOSIS — Z12.11 COLON CANCER SCREENING: ICD-10-CM

## 2022-03-07 DIAGNOSIS — K76.0 FATTY LIVER: ICD-10-CM

## 2022-03-07 DIAGNOSIS — K82.8 GALLBLADDER SLUDGE: ICD-10-CM

## 2022-03-07 DIAGNOSIS — E11.69 TYPE 2 DIABETES MELLITUS WITH OTHER SPECIFIED COMPLICATION, WITHOUT LONG-TERM CURRENT USE OF INSULIN (HCC): ICD-10-CM

## 2022-03-07 PROBLEM — M95.4 STERNAL DEFORMITY: Status: RESOLVED | Noted: 2022-02-07 | Resolved: 2022-03-07

## 2022-03-07 PROCEDURE — 99214 OFFICE O/P EST MOD 30 MIN: CPT | Performed by: PHYSICIAN ASSISTANT

## 2022-03-07 ASSESSMENT — FIBROSIS 4 INDEX: FIB4 SCORE: 0.89

## 2022-03-07 ASSESSMENT — PAIN SCALES - GENERAL: PAINLEVEL: 1=MINIMAL PAIN

## 2022-03-08 NOTE — PROGRESS NOTES
Subjective:     CC:   Chief Complaint   Patient presents with   • Follow-Up     1 month follow up    • Lab Results     Imaging result       HPI:   Diogo presents today to discuss:    Gallbladder sludge  Noted on imaging, patient denies any symptoms such as nausea, postprandial pain, bloating.    Fatty liver  Noted on imaging, previous history of elevated ALT.  History of diabetes and high cholesterol.  Patient did not get his labs drawn.    Diabetes mellitus (HCC)  Chronic, on metformin 1000 mg twice a day and Ozempic 0.25 mg weekly.  Tolerating both medications well.  Also on statin.  Patient did not get his labs drawn.      History reviewed. No pertinent past medical history.  Social History     Tobacco Use   • Smoking status: Former Smoker     Packs/day: 0.00     Types: Cigarettes     Quit date: 2017     Years since quittin.2   • Smokeless tobacco: Former User   • Tobacco comment: States that he quit in 2017, previous he would smoke when he was stressed.   Vaping Use   • Vaping Use: Never used   Substance Use Topics   • Alcohol use: Yes     Alcohol/week: 1.8 oz     Types: 3 Standard drinks or equivalent per week     Comment: occasionally    • Drug use: Yes     Types: Marijuana, Inhaled     Comment: occasional     Current Outpatient Medications Ordered in Epic   Medication Sig Dispense Refill   • atorvastatin (LIPITOR) 10 MG Tab TAKE 1 TABLET BY MOUTH EVERY DAY IN THE EVENING 90 Tablet 3   • Fenofibrate 120 MG Tab TAKE 1 TABLET BY MOUTH EVERY DAY 90 Tablet 3   • metformin (GLUCOPHAGE) 1000 MG tablet Take 1 Tablet by mouth 2 (two) times a day.     • Semaglutide,0.25 or 0.5MG/DOS, (OZEMPIC, 0.25 OR 0.5 MG/DOSE,) 2 MG/1.5ML Solution Pen-injector Inject 0.25 mg under the skin every 7 days. 1.5 mL 0   • Lancets Lancets order: Lancets for Accucheck Laura meter. Sig: use in am for FBS and prn ssx high or low sugar. #100 RF x 3 100 Each 3   • Alcohol Swabs ([facility] PREP) 70 % Pads 1 Package by Other route 3 times a  "week. 100 Each 0   • ACCU-CHEK BERENICE PLUS strip Check FBS and 2 hours post meal glucose 3-4 times a week. 100 Strip 1   • Coenzyme Q10 (COQ10 PO) Take  by mouth.     • Ascorbic Acid (VITAMIN C) 1000 MG Tab Take  by mouth.     • Blood Glucose Monitoring Suppl (ACCU-CHEK BERENICE PLUS) w/Device Kit      • Multiple Vitamins-Minerals (MULTIVITAMIN ADULT PO) Take  by mouth.     • Calcium Polycarbophil (FIBER-LAX PO) Take  by mouth.     • Omega-3 Fatty Acids (FISH OIL) 1000 MG Cap capsule Take 1,000 mg by mouth every day.     • Probiotic Product (PROBIOTIC DAILY PO) Take  by mouth.     • IBUPROFEN PO Take  by mouth.       No current Epic-ordered facility-administered medications on file.     Codeine    ROS: see hpi  Gen: no fevers/chills  Pulm: no sob, no cough  CV: no chest pain, no palpitations, no edema  GI: no nausea/vomiting, no diarrhea  Skin: no rash    Objective:   Exam:  /90 (BP Location: Left arm, Patient Position: Sitting, BP Cuff Size: Adult)   Pulse 81   Temp 36.7 °C (98.1 °F) (Temporal)   Ht 1.753 m (5' 9\")   Wt 93.9 kg (207 lb)   SpO2 96%   BMI 30.57 kg/m²    Body mass index is 30.57 kg/m².    Gen: Alert and oriented, No apparent distress.  HEENT: Head atraumatic, normocephalic. Pupils equal and round.  Ext: No clubbing, cyanosis, edema.    Assessment & Plan:     46 y.o. male with the following -     1. Type 2 diabetes mellitus with other specified complication, without long-term current use of insulin (HCC)  Chronic, on Ozempic and Metformin.  On statin.  Encouraged patient to get labs drawn.    Diabetes recommendations:  -Follow an 1800 calorie ADA diet and aim to get about half of your calories from carbohydrates. For an 1800 calorie diet, that would be around 150-200 grams of carbs a day. Exercise as tolerated; ideally 150 minutes of cardiovascular exercise a week, or 20 minutes a day.   -Monitor blood sugars at home and keep a log book. Check your glucose fasting every morning, before dinner, " and 2 hours after dinner (or as otherwise recommended at your appointment). Call if glucose <70 or >300. Please send weekly readings of your blood glucose through [employer].  -Please check your feet frequently for any open wounds/ulcers, signs of infection, or changes in sensation/neuropathy.  -Ensure that you see your ophthalmologist for your annual eye exam to assess for diabetic retinopathy.    2. Gallbladder sludge  Noted on imaging, if cholesterol is elevated with improved triglycerides will trial bile acid sequestrant.  Discussed symptoms of gallbladder such as pain, nausea, vomiting often postprandial.  ER if the symptoms develop.    3. Fatty liver  Please continue working on lifestyle modifications. This includes accumulation of 150 minutes or more of moderate-intensity activity each week as tolerated and a healthy diet that is low in saturated fat, sodium, and cholesterol, such as the mediterranean diet that is typically high in fruits, vegetables, whole grains, beans, nuts, and seeds, includes olive oil as an important source of monounsaturated fat, DASH diet, and/or also consider a plant-based diet.    4. Colon cancer screening  - Referral to GI for Colonoscopy      Return for Will notify patient to follow-up pending tests.    Jane Wells PA-C (Baker)  Physician Assistant Certified  [facility]    Please note that this dictation was created using voice recognition software. I have made every reasonable attempt to correct obvious errors, but I expect that there are errors of grammar and possibly content that I did not discover before finalizing the note.

## 2022-03-08 NOTE — PATIENT INSTRUCTIONS
It was a pleasure meeting with you today at [facility]!    Your medical history/records and medications were reviewed today.     If you have any prescription refill requests, please send them via [employer] or discuss with your provider at the start of your office visits. Please allow 3-5 business days for lab and testing review and you will be contacted via [employer] with those results, or if advised to make a follow up appointment regarding those results, then please do so.     Once resulted, your lab/test/imaging results will show up automatically in your MyChart. Please wait for my interpretation and recommendations prior to viewing your results to avoid any unnecessary confusion or misinterpretation. I will address all of the lab values that I interpret as abnormal and message you accordingly on your MyChart. I will always send you a message about your results even if they are normal. If you do not hear back from me within 5-7 business days after completing your tests, then please send me a message on [employer] so I can obtain your results (especially if you went to an outside lab or imaging center - [facility], [facility], etc).     If you have any additional questions or concerns beyond my interpretation of your results, please make an appointment with me to discuss in further detail.    Please only use the [employer] messaging system for questions regarding your most recent appointment or if advised to use otherwise (glucose or blood pressure reporting).     If you have any new problems or concerns, you must make an appointment to discuss. This includes any referral requests, lab requests (unless advised to notify me for pre-appt labs), medication side effects, or request for medication adjustments.       Thank you,    Jaen Wells PA-C (Baker)  Physician Assistant Certified  [facility]

## 2022-03-08 NOTE — ASSESSMENT & PLAN NOTE
Chronic, on metformin 1000 mg twice a day and Ozempic 0.25 mg weekly.  Tolerating both medications well.  Also on statin.  Patient did not get his labs drawn.

## 2022-03-08 NOTE — ASSESSMENT & PLAN NOTE
Noted on imaging, previous history of elevated ALT.  History of diabetes and high cholesterol.  Patient did not get his labs drawn.

## 2022-03-16 ENCOUNTER — HOSPITAL ENCOUNTER (OUTPATIENT)
Dept: LAB | Facility: MEDICAL CENTER | Age: 47
End: 2022-03-16
Attending: PHYSICIAN ASSISTANT
Payer: COMMERCIAL

## 2022-03-16 DIAGNOSIS — Z13.29 SCREENING FOR THYROID DISORDER: ICD-10-CM

## 2022-03-16 DIAGNOSIS — E78.2 MIXED HYPERLIPIDEMIA: ICD-10-CM

## 2022-03-16 DIAGNOSIS — Z13.0 SCREENING FOR DEFICIENCY ANEMIA: ICD-10-CM

## 2022-03-16 DIAGNOSIS — R14.0 BLOATING: ICD-10-CM

## 2022-03-16 DIAGNOSIS — M79.671 RIGHT FOOT PAIN: ICD-10-CM

## 2022-03-16 DIAGNOSIS — E11.65 TYPE 2 DIABETES MELLITUS WITH HYPERGLYCEMIA, WITHOUT LONG-TERM CURRENT USE OF INSULIN (HCC): ICD-10-CM

## 2022-03-16 DIAGNOSIS — Z78.9 ALCOHOL USE: ICD-10-CM

## 2022-03-16 DIAGNOSIS — R74.01 ELEVATED ALT MEASUREMENT: ICD-10-CM

## 2022-03-16 DIAGNOSIS — Z13.21 ENCOUNTER FOR VITAMIN DEFICIENCY SCREENING: ICD-10-CM

## 2022-03-16 LAB
25(OH)D3 SERPL-MCNC: 28 NG/ML (ref 30–100)
ALBUMIN SERPL BCP-MCNC: 4.6 G/DL (ref 3.2–4.9)
ALBUMIN/GLOB SERPL: 2.1 G/DL
ALP SERPL-CCNC: 78 U/L (ref 30–99)
ALT SERPL-CCNC: 33 U/L (ref 2–50)
ANION GAP SERPL CALC-SCNC: 13 MMOL/L (ref 7–16)
AST SERPL-CCNC: 20 U/L (ref 12–45)
BASOPHILS # BLD AUTO: 0.3 % (ref 0–1.8)
BASOPHILS # BLD: 0.02 K/UL (ref 0–0.12)
BILIRUB SERPL-MCNC: 0.7 MG/DL (ref 0.1–1.5)
BUN SERPL-MCNC: 15 MG/DL (ref 8–22)
CALCIUM SERPL-MCNC: 9.4 MG/DL (ref 8.5–10.5)
CHLORIDE SERPL-SCNC: 106 MMOL/L (ref 96–112)
CHOLEST SERPL-MCNC: 162 MG/DL (ref 100–199)
CO2 SERPL-SCNC: 23 MMOL/L (ref 20–33)
CREAT SERPL-MCNC: 0.95 MG/DL (ref 0.5–1.4)
EOSINOPHIL # BLD AUTO: 0.12 K/UL (ref 0–0.51)
EOSINOPHIL NFR BLD: 2.1 % (ref 0–6.9)
ERYTHROCYTE [DISTWIDTH] IN BLOOD BY AUTOMATED COUNT: 40.4 FL (ref 35.9–50)
EST. AVERAGE GLUCOSE BLD GHB EST-MCNC: 214 MG/DL
FASTING STATUS PATIENT QL REPORTED: NORMAL
GFR SERPLBLD CREATININE-BSD FMLA CKD-EPI: 98 ML/MIN/1.73 M 2
GGT SERPL-CCNC: 34 U/L (ref 7–51)
GLOBULIN SER CALC-MCNC: 2.2 G/DL (ref 1.9–3.5)
GLUCOSE SERPL-MCNC: 191 MG/DL (ref 65–99)
HBA1C MFR BLD: 9.1 % (ref 4–5.6)
HCT VFR BLD AUTO: 48.2 % (ref 42–52)
HDLC SERPL-MCNC: 39 MG/DL
HGB BLD-MCNC: 16.5 G/DL (ref 14–18)
IMM GRANULOCYTES # BLD AUTO: 0.02 K/UL (ref 0–0.11)
IMM GRANULOCYTES NFR BLD AUTO: 0.3 % (ref 0–0.9)
LDLC SERPL CALC-MCNC: 76 MG/DL
LYMPHOCYTES # BLD AUTO: 2.06 K/UL (ref 1–4.8)
LYMPHOCYTES NFR BLD: 35.6 % (ref 22–41)
MCH RBC QN AUTO: 30.1 PG (ref 27–33)
MCHC RBC AUTO-ENTMCNC: 34.2 G/DL (ref 33.7–35.3)
MCV RBC AUTO: 87.8 FL (ref 81.4–97.8)
MONOCYTES # BLD AUTO: 0.43 K/UL (ref 0–0.85)
MONOCYTES NFR BLD AUTO: 7.4 % (ref 0–13.4)
NEUTROPHILS # BLD AUTO: 3.13 K/UL (ref 1.82–7.42)
NEUTROPHILS NFR BLD: 54.3 % (ref 44–72)
NRBC # BLD AUTO: 0 K/UL
NRBC BLD-RTO: 0 /100 WBC
PLATELET # BLD AUTO: 219 K/UL (ref 164–446)
PMV BLD AUTO: 10.6 FL (ref 9–12.9)
POTASSIUM SERPL-SCNC: 5 MMOL/L (ref 3.6–5.5)
PROT SERPL-MCNC: 6.8 G/DL (ref 6–8.2)
RBC # BLD AUTO: 5.49 M/UL (ref 4.7–6.1)
SODIUM SERPL-SCNC: 142 MMOL/L (ref 135–145)
TRIGL SERPL-MCNC: 236 MG/DL (ref 0–149)
TSH SERPL DL<=0.005 MIU/L-ACNC: 1.36 UIU/ML (ref 0.38–5.33)
URATE SERPL-MCNC: 4.2 MG/DL (ref 2.5–8.3)
VIT B12 SERPL-MCNC: 391 PG/ML (ref 211–911)
WBC # BLD AUTO: 5.8 K/UL (ref 4.8–10.8)

## 2022-03-16 PROCEDURE — 82306 VITAMIN D 25 HYDROXY: CPT

## 2022-03-16 PROCEDURE — 80061 LIPID PANEL: CPT

## 2022-03-16 PROCEDURE — 80053 COMPREHEN METABOLIC PANEL: CPT

## 2022-03-16 PROCEDURE — 84550 ASSAY OF BLOOD/URIC ACID: CPT

## 2022-03-16 PROCEDURE — 85025 COMPLETE CBC W/AUTO DIFF WBC: CPT

## 2022-03-16 PROCEDURE — 82607 VITAMIN B-12: CPT

## 2022-03-16 PROCEDURE — 83036 HEMOGLOBIN GLYCOSYLATED A1C: CPT

## 2022-03-16 PROCEDURE — 84443 ASSAY THYROID STIM HORMONE: CPT

## 2022-03-16 PROCEDURE — 36415 COLL VENOUS BLD VENIPUNCTURE: CPT

## 2022-03-16 PROCEDURE — 82977 ASSAY OF GGT: CPT

## 2022-03-17 ENCOUNTER — OFFICE VISIT (OUTPATIENT)
Dept: MEDICAL GROUP | Facility: IMAGING CENTER | Age: 47
End: 2022-03-17
Payer: COMMERCIAL

## 2022-03-17 VITALS
HEART RATE: 86 BPM | TEMPERATURE: 97.4 F | SYSTOLIC BLOOD PRESSURE: 118 MMHG | WEIGHT: 205 LBS | BODY MASS INDEX: 30.36 KG/M2 | DIASTOLIC BLOOD PRESSURE: 82 MMHG | OXYGEN SATURATION: 96 % | HEIGHT: 69 IN

## 2022-03-17 DIAGNOSIS — E53.8 LOW SERUM VITAMIN B12: ICD-10-CM

## 2022-03-17 DIAGNOSIS — E11.69 TYPE 2 DIABETES MELLITUS WITH OTHER SPECIFIED COMPLICATION, WITHOUT LONG-TERM CURRENT USE OF INSULIN (HCC): ICD-10-CM

## 2022-03-17 DIAGNOSIS — E78.2 MIXED HYPERLIPIDEMIA: ICD-10-CM

## 2022-03-17 DIAGNOSIS — E55.9 VITAMIN D DEFICIENCY: ICD-10-CM

## 2022-03-17 DIAGNOSIS — Z71.3 DIETARY COUNSELING: ICD-10-CM

## 2022-03-17 DIAGNOSIS — R74.01 ELEVATED ALT MEASUREMENT: ICD-10-CM

## 2022-03-17 DIAGNOSIS — E11.65 TYPE 2 DIABETES MELLITUS WITH HYPERGLYCEMIA, WITHOUT LONG-TERM CURRENT USE OF INSULIN (HCC): ICD-10-CM

## 2022-03-17 PROCEDURE — 99214 OFFICE O/P EST MOD 30 MIN: CPT | Performed by: PHYSICIAN ASSISTANT

## 2022-03-17 RX ORDER — FENOFIBRATE 145 MG/1
145 TABLET, COATED ORAL DAILY
Qty: 90 TABLET | Refills: 1 | Status: SHIPPED | OUTPATIENT
Start: 2022-03-17 | End: 2022-06-21 | Stop reason: SDUPTHER

## 2022-03-17 RX ORDER — SEMAGLUTIDE 1.34 MG/ML
0.5 INJECTION, SOLUTION SUBCUTANEOUS
Qty: 1.5 ML | Refills: 0
Start: 2022-03-17 | End: 2022-03-17

## 2022-03-17 RX ORDER — OMEGA-3-ACID ETHYL ESTERS 1 G/1
2 CAPSULE, LIQUID FILLED ORAL
Qty: 360 CAPSULE | Refills: 1 | Status: SHIPPED | OUTPATIENT
Start: 2022-03-17 | End: 2022-03-28

## 2022-03-17 ASSESSMENT — FIBROSIS 4 INDEX: FIB4 SCORE: 0.76

## 2022-03-17 ASSESSMENT — PAIN SCALES - GENERAL: PAINLEVEL: NO PAIN

## 2022-03-17 NOTE — PATIENT INSTRUCTIONS
Weight loss goals:    · Create a food log or download [employer] to log your dietary intake.   · Limit/Eliminate sugary drinks from your diet (juice, soda) and increase your servings of fruit and vegetables.   ? Try to fill half your dinner plate with vegetables and limit red meat consumption.   ? Avoid “liquid calories”, as they do not fill you up as effectively as the same amount of calories of a nutrient dense, high fiber meal.  · The ideal diet to follow is the Mediterranean diet and it has been found to reduce your cardiovascular risk.     Your weight today is 205 lbs.   Your TDEE is 2148 (how many calories on average that you burn a day based on age, height, weight, and activity level).    · Your goal is to eliminate 500 calories a day - whether this is by increasing exercise, decreasing caloric intake, or a combination of both. It is recommended to perform moderate exercise at least 150 mins/week (20 minutes a day).   · Limit weighing yourself to once a week, as weight can fluctuate daily and often can be discouraging.   · It is important to avoid any environmental or social stimuli that may contribute to overeating (watching tv/phone time while eating etc.).  · Work on “mindful eating” - concentrating on the tastes and textures of food and savoring what is being eaten by chewing more slowly; drinking water in between bites    Eating Goals:  Calories 1650 a day  Protein 100 grams a day  Carbs 220 grams a day  Fat 47 grams a day  Sugar <44 grams a day  Saturated Fat <19 grams a day    High Protein Diet  A high protein diet means that high protein foods are added to your diet. Getting more protein in the diet is important for a number of reasons. Protein helps the body to build tissue, muscle, and to repair damage. People who have had surgery, injuries such as broken bones, infections, and burns, or illnesses such as cancer, may need more protein in their diet.   SERVING SIZES  Measuring foods and serving  sizes helps to make sure you are getting the right amount of food. The list below tells how big or small some common serving sizes are.   · 1 oz.........4 stacked dice.  · 3 oz.........Deck of cards.  · 1 tsp........Tip of little finger.  · 1 tbs........Thumb.  · 2 tbs........Golf ball.  · ½ cup.......Half of a fist.  · 1 cup........A fist.  FOOD SOURCES OF PROTEIN  Listed below are some food sources of protein and the amount of protein they contain. Your Registered Dietitian can calculate how many grams of protein you need for your medical condition. High protein foods can be added to the diet at mealtime or as snacks. Be sure to have at least 1 protein-containing food at each meal and snack to ensure adequate intake.   Meats and Meat Substitutes / Protein (g)  · 3 oz poultry (chicken, turkey) / 26 g  · 3 oz tuna, canned in water / 26 g  · 3 oz fish (cod) / 21 g  · 3 oz red meat (beef, pork) / 21 g  · 4 oz tofu / 9 g  · 1 egg / 6 g  · ¼ cup egg substitute / 5 g  · 1 cup dried beans / 15 g  · 1 cup soy milk / 4 g  Dairy / Protein (g)  · 1 cup milk (skim, 1%, 2%, whole) / 8 g  · ½ cup evaporated milk / 9 g  · 1 cup buttermilk / 8 g  · 1 cup low-fat plain yogurt / 11 g  · 1 cup regular plain yogurt / 9 g  · ½ cup cottage cheese / 14 g  · 1 oz cheddar cheese / 7 g  Nuts / Protein (g)  · 2 tbs peanut butter / 8 g  · 1 oz peanuts / 7 g  · 2 tbs cashews / 5 g  · 2 tbs almonds / 5 g  Document Released: 12/18/2006 Document Revised: 03/11/2013 Document Reviewed: 09/19/2008  [employer]® Patient Information ©2014 [employer].    Protein Content in Foods    Generally, most healthy people need around 50 grams of protein each day. Depending on your overall health, you may need more or less protein in your diet. Talk to your health care provider or dietitian about how much protein you need.  See the following list for the protein content of some common foods.  High-protein foods  High-protein foods contain 4 grams (4 g) or more of  protein per serving. They include:  · Beef, ground sirloin (cooked) -- 3 oz have 24 g of protein.  · Cheese (hard) -- 1 oz has 7 g of protein.  · Chicken breast, boneless and skinless (cooked) -- 3 oz have 13.4 g of protein.  · Cottage cheese -- 1/2 cup has 13.4 g of protein.  · Egg -- 1 egg has 6 g of protein.  · Fish, filet (cooked) -- 1 oz has 6-7 g of protein.  · Garbanzo beans (canned or cooked) -- 1/2 cup has 6-7 g of protein.  · Kidney beans (canned or cooked) -- 1/2 cup has 6-7 g of protein.  · Lamb (cooked) -- 3 oz has 24 g of protein.  · Milk -- 1 cup (8 oz) has 8 g of protein.  · Nuts (peanuts, pistachios, almonds) -- 1 oz has 6 g of protein.  · Peanut butter -- 1 oz has 7-8 g of protein.  · Pork tenderloin (cooked) -- 3 oz has 18.4 g of protein.  · Pumpkin seeds -- 1 oz has 8.5 g of protein.  · Soybeans (roasted) -- 1 oz has 8 g of protein.  · Soybeans (cooked) -- 1/2 cup has 11 g of protein.  · Soy milk -- 1 cup (8 oz) has 5-10 g of protein.  · Soy or vegetable dariusz -- 1 dariusz has 11 g of protein.  · Sunflower seeds -- 1 oz has 5.5 g of protein.  · Tofu (firm) -- 1/2 cup has 20 g of protein.  · Tuna (canned in water) -- 3 oz has 20 g of protein.  · Yogurt -- 6 oz has 8 g of protein.  Low-protein foods  Low-protein foods contain 3 grams (3 g) or less of protein per serving. They include:  · Beets (raw or cooked) -- 1/2 cup has 1.5 g of protein.  · Bran cereal -- 1/2 cup has 2-3 g of protein.  · Bread -- 1 slice has 2.5 g of protein.  · Broccoli (raw or cooked) -- 1/2 cup has 2 g of protein.  · Willie greens (raw or cooked) -- 1/2 cup has 2 g of protein.  · Corn (fresh or cooked) -- 1/2 cup has 2 g of protein.  · Cream cheese -- 1 oz has 2 g of protein.  · Creamer (half-and-half) -- 1 oz has 1 g of protein.  · Flour tortilla -- 1 tortilla has 2.5 g of protein  · Frozen yogurt -- 1/2 cup has 3 g of protein.  · Fruit or vegetable juice -- 1/2 cup has 1 g of protein.  · Green beans (raw or cooked) -- 1/2  cup has 1 g of protein.  · Green peas (canned) -- 1/2 cup has 3.5 g of protein.  · Muffins -- 1 small muffin (2 oz) has 3 g of protein.  · Oatmeal (cooked) -- 1/2 cup has 3 g of protein.  · Potato (baked with skin) -- 1 medium potato has 3 g of protein.  · Rice (cooked) -- 1/2 cup has 2.5-3.5 g of protein.  · Sour cream -- 1/2 cup has 2.5 g of protein.  · Spinach (cooked) -- 1/2 cup has 3 g of protein.  · Squash (cooked) -- 1/2 cup has 1.5 g of protein.  Actual amounts of protein may be different depending on processing. Talk with your health care provider or dietitian about what foods are recommended for you.  This information is not intended to replace advice given to you by your health care provider. Make sure you discuss any questions you have with your health care provider.  Document Released: 03/18/2017 Document Revised: 08/28/2017 Document Reviewed: 08/28/2017  Elsevier Patient Education © 2020 [employer] Inc.

## 2022-03-17 NOTE — ASSESSMENT & PLAN NOTE
Chronic, on metformin 1000 mg twice a day, semaglutide 0.25 mg weekly.  Patient states that he often forgets to take semaglutide and would prefer an oral version.  He states that he has a difficult time with administering the injections.     Ref. Range 5/7/2021 08:14 8/21/2021 11:40 3/16/2022 10:30   Glycohemoglobin Latest Ref Range: 4.0 - 5.6 % 9.5 (H) 8.4 (H) 9.1 (H)

## 2022-03-17 NOTE — ASSESSMENT & PLAN NOTE
Chronic, on atorvastatin 10 mg daily and fenofibrate 120 mg daily.  Patient requesting new prescription for fenofibrate as it is quite expensive.  He has not been monitoring his diet.  He has been trying to decrease his alcohol intake.     Ref. Range 8/21/2021 11:40 3/16/2022 10:30   Cholesterol,Tot Latest Ref Range: 100 - 199 mg/dL 137 162   Triglycerides Latest Ref Range: 0 - 149 mg/dL 229 (H) 236 (H)   HDL Latest Ref Range: >=40 mg/dL 33 (A) 39 (A)   LDL Latest Ref Range: <100 mg/dL 58 76

## 2022-03-17 NOTE — ASSESSMENT & PLAN NOTE
Ref. Range 5/7/2021 08:14 8/21/2021 11:40 3/16/2022 10:30   ALT(SGPT) Latest Ref Range: 2 - 50 U/L 58 (H) 57 (H) 33   Alkaline Phosphatase Latest Ref Range: 30 - 99 U/L 88 76 78

## 2022-03-17 NOTE — PROGRESS NOTES
Subjective:     CC:   Chief Complaint   Patient presents with   • Follow-Up   • Lab Results       HPI:   Diogo presents today to discuss:    Diabetes mellitus (HCC)  Chronic, on metformin 1000 mg twice a day, semaglutide 0.25 mg weekly.  Patient states that he often forgets to take semaglutide and would prefer an oral version.  He states that he has a difficult time with administering the injections.     Ref. Range 2021 08:14 2021 11:40 3/16/2022 10:30   Glycohemoglobin Latest Ref Range: 4.0 - 5.6 % 9.5 (H) 8.4 (H) 9.1 (H)       Mixed hyperlipidemia  Chronic, on atorvastatin 10 mg daily and fenofibrate 120 mg daily.  Patient requesting new prescription for fenofibrate as it is quite expensive.  He has not been monitoring his diet.  He has been trying to decrease his alcohol intake.     Ref. Range 2021 11:40 3/16/2022 10:30   Cholesterol,Tot Latest Ref Range: 100 - 199 mg/dL 137 162   Triglycerides Latest Ref Range: 0 - 149 mg/dL 229 (H) 236 (H)   HDL Latest Ref Range: >=40 mg/dL 33 (A) 39 (A)   LDL Latest Ref Range: <100 mg/dL 58 76       Elevated ALT measurement     Ref. Range 2021 08:14 2021 11:40 3/16/2022 10:30   ALT(SGPT) Latest Ref Range: 2 - 50 U/L 58 (H) 57 (H) 33   Alkaline Phosphatase Latest Ref Range: 30 - 99 U/L 88 76 78       History reviewed. No pertinent past medical history.  Family History   Problem Relation Age of Onset   • Diabetes Mother    • Stroke Father    • Stroke Paternal Grandfather      Past Surgical History:   Procedure Laterality Date   • APPENDECTOMY       Social History     Tobacco Use   • Smoking status: Former Smoker     Packs/day: 0.00     Types: Cigarettes     Quit date: 2017     Years since quittin.2   • Smokeless tobacco: Former User   • Tobacco comment: States that he quit in 2017, previous he would smoke when he was stressed.   Vaping Use   • Vaping Use: Never used   Substance Use Topics   • Alcohol use: Yes     Alcohol/week: 1.8 oz     Types: 3  Standard drinks or equivalent per week     Comment: occasionally    • Drug use: Yes     Types: Marijuana, Inhaled     Comment: occasional     Social History     Social History Narrative    Born and raised in [place]    Moved from [facility] ed         Current Outpatient Medications Ordered in Epic   Medication Sig Dispense Refill   • Semaglutide 7 MG Tab Take 1 Tablet by mouth every day. 90 Tablet 1   • fenofibrate (TRICOR) 145 MG Tab Take 1 Tablet by mouth every day. 90 Tablet 1   • omega-3 acid ethyl esters (LOVAZA) 1 GM capsule Take 2 Capsules by mouth 2 (two) times a day. 360 Capsule 1   • atorvastatin (LIPITOR) 10 MG Tab TAKE 1 TABLET BY MOUTH EVERY DAY IN THE EVENING 90 Tablet 3   • metformin (GLUCOPHAGE) 1000 MG tablet Take 1 Tablet by mouth 2 (two) times a day.     • Lancets Lancets order: Lancets for Accucheck Laura meter. Sig: use in am for FBS and prn ssx high or low sugar. #100 RF x 3 100 Each 3   • Alcohol Swabs ([facility]S PREP) 70 % Pads 1 Package by Other route 3 times a week. 100 Each 0   • ACCU-CHEK LAURA PLUS strip Check FBS and 2 hours post meal glucose 3-4 times a week. 100 Strip 1   • Coenzyme Q10 (COQ10 PO) Take  by mouth.     • Ascorbic Acid (VITAMIN C) 1000 MG Tab Take  by mouth.     • Blood Glucose Monitoring Suppl (ACCU-CHEK LAURA PLUS) w/Device Kit      • Multiple Vitamins-Minerals (MULTIVITAMIN ADULT PO) Take  by mouth.     • Calcium Polycarbophil (FIBER-LAX PO) Take  by mouth.     • Probiotic Product (PROBIOTIC DAILY PO) Take  by mouth.     • IBUPROFEN PO Take  by mouth.       No current Epic-ordered facility-administered medications on file.     Codeine    Health Maintenance: Completed    ROS: see hpi  Gen: no fevers/chills  Pulm: no sob, no cough  CV: no chest pain, no palpitations, no edema  GI: no nausea/vomiting, no diarrhea  Skin: no rash    Objective:   Exam:  /82 (BP Location: Left arm, Patient Position: Sitting, BP Cuff Size: Adult)   Pulse 86   Temp 36.3  "°C (97.4 °F) (Temporal)   Ht 1.753 m (5' 9\")   Wt 93 kg (205 lb)   SpO2 96%   BMI 30.27 kg/m²    Body mass index is 30.27 kg/m².    Gen: Alert and oriented, No apparent distress.  HEENT: Head atraumatic, normocephalic. Pupils equal and round.  Ext: No clubbing, cyanosis, edema.    Assessment & Plan:     46 y.o. male with the following -     1. Type 2 diabetes mellitus with other specified complication, without long-term current use of insulin (HCC)  Chronic, uncontrolled.  Change semaglutide to 7 mg tablets daily.  - Semaglutide 7 MG Tab; Take 1 Tablet by mouth every day.  Dispense: 90 Tablet; Refill: 1    2. Mixed hyperlipidemia  Chronic, uncontrolled.  Change fenofibric 245 mg daily and add Lovaza 2 g twice a day.  - fenofibrate (TRICOR) 145 MG Tab; Take 1 Tablet by mouth every day.  Dispense: 90 Tablet; Refill: 1    3. Elevated ALT measurement  Resolved.  Continue to monitor.    4. Vitamin D deficiency  Start vitamin D 2000 IU daily over-the-counter.    5. Low serum vitamin B12  Start vitamin B12 250 MCG daily over-the-counter.    6. Dietary counseling  Please continue working on lifestyle modifications. This includes accumulation of 150 minutes or more of moderate-intensity activity each week as tolerated and a healthy diet that is low in saturated fat, sodium, and cholesterol, such as the mediterranean diet that is typically high in fruits, vegetables, whole grains, beans, nuts, and seeds, includes olive oil as an important source of monounsaturated fat, DASH diet, and/or also consider a plant-based diet.  Discussed protein goals of at least 100 g a day and calorie goal of 1600 mason a day with a 20-minute walk.    Return in about 3 months (around 6/27/2022) for Follow-up.    Jane Wells PA-C (Baker)  Physician Assistant Certified  [facility]    Please note that this dictation was created using voice recognition software. I have made every reasonable attempt to correct obvious errors, but I " expect that there are errors of grammar and possibly content that I did not discover before finalizing the note.

## 2022-03-23 LAB
APOB+LDLR+PCSK9 GENE MUT ANL BLD/T: NOT DETECTED
BRCA1+BRCA2 DEL+DUP + FULL MUT ANL BLD/T: NOT DETECTED
MLH1+MSH2+MSH6+PMS2 GN DEL+DUP+FUL M: NOT DETECTED

## 2022-03-28 DIAGNOSIS — E78.2 MIXED HYPERLIPIDEMIA: ICD-10-CM

## 2022-03-28 DIAGNOSIS — E78.1 HYPERTRIGLYCERIDEMIA: ICD-10-CM

## 2022-03-28 RX ORDER — ICOSAPENT ETHYL 1000 MG/1
2 CAPSULE ORAL
Qty: 120 CAPSULE | Refills: 2 | Status: SHIPPED | OUTPATIENT
Start: 2022-03-28 | End: 2022-06-21

## 2022-03-28 NOTE — TELEPHONE ENCOUNTER
Patient with hypertriglyceridemia despite statin therapy, over-the-counter fish oil, and fenofibrate therapy.  Will prescribe icosapent ethyl 2 g twice a day.    Jane Wells PA-C (Baker)  Physician Assistant Certified  [employer]

## 2022-04-07 ENCOUNTER — TELEPHONE (OUTPATIENT)
Dept: MEDICAL GROUP | Facility: IMAGING CENTER | Age: 47
End: 2022-04-07
Payer: COMMERCIAL

## 2022-04-07 NOTE — TELEPHONE ENCOUNTER
FINAL PRIOR AUTHORIZATION STATUS:    1.  Name of Medication & Dose: Icosapent      2. Prior Auth Status: Approved through 4/7/23     3. Action Taken: Pharmacy Notified: N\A Patient Notified: N\A

## 2022-06-21 ENCOUNTER — HOSPITAL ENCOUNTER (OUTPATIENT)
Facility: MEDICAL CENTER | Age: 47
End: 2022-06-21
Attending: PHYSICIAN ASSISTANT
Payer: COMMERCIAL

## 2022-06-21 ENCOUNTER — OFFICE VISIT (OUTPATIENT)
Dept: MEDICAL GROUP | Facility: IMAGING CENTER | Age: 47
End: 2022-06-21
Payer: COMMERCIAL

## 2022-06-21 VITALS
WEIGHT: 203 LBS | DIASTOLIC BLOOD PRESSURE: 62 MMHG | SYSTOLIC BLOOD PRESSURE: 112 MMHG | HEIGHT: 69 IN | BODY MASS INDEX: 30.07 KG/M2 | TEMPERATURE: 97.9 F | HEART RATE: 87 BPM | OXYGEN SATURATION: 96 %

## 2022-06-21 DIAGNOSIS — E78.2 MIXED HYPERLIPIDEMIA: ICD-10-CM

## 2022-06-21 DIAGNOSIS — E53.8 LOW SERUM VITAMIN B12: ICD-10-CM

## 2022-06-21 DIAGNOSIS — L98.9 FOOT LESION: ICD-10-CM

## 2022-06-21 DIAGNOSIS — Z12.5 PROSTATE CANCER SCREENING: ICD-10-CM

## 2022-06-21 DIAGNOSIS — R35.0 URINARY FREQUENCY: ICD-10-CM

## 2022-06-21 DIAGNOSIS — E11.65 TYPE 2 DIABETES MELLITUS WITH HYPERGLYCEMIA, WITHOUT LONG-TERM CURRENT USE OF INSULIN (HCC): ICD-10-CM

## 2022-06-21 DIAGNOSIS — E55.9 VITAMIN D DEFICIENCY: ICD-10-CM

## 2022-06-21 DIAGNOSIS — E11.69 TYPE 2 DIABETES MELLITUS WITH OTHER SPECIFIED COMPLICATION, WITHOUT LONG-TERM CURRENT USE OF INSULIN (HCC): ICD-10-CM

## 2022-06-21 PROBLEM — R74.01 ELEVATED ALT MEASUREMENT: Status: RESOLVED | Noted: 2022-02-07 | Resolved: 2022-06-21

## 2022-06-21 PROBLEM — M79.671 RIGHT FOOT PAIN: Status: RESOLVED | Noted: 2022-02-07 | Resolved: 2022-06-21

## 2022-06-21 LAB
APPEARANCE UR: NORMAL
BILIRUB UR STRIP-MCNC: NEGATIVE MG/DL
COLOR UR AUTO: NORMAL
CREAT UR-MCNC: 206.59 MG/DL
GLUCOSE UR STRIP.AUTO-MCNC: 500 MG/DL
KETONES UR STRIP.AUTO-MCNC: NEGATIVE MG/DL
LEUKOCYTE ESTERASE UR QL STRIP.AUTO: NEGATIVE
MICROALBUMIN UR-MCNC: 5.5 MG/DL
MICROALBUMIN/CREAT UR: 27 MG/G (ref 0–30)
NITRITE UR QL STRIP.AUTO: NEGATIVE
PH UR STRIP.AUTO: 5 [PH] (ref 5–8)
PROT UR QL STRIP: NORMAL MG/DL
RBC UR QL AUTO: NEGATIVE
SP GR UR STRIP.AUTO: >=1.03
UROBILINOGEN UR STRIP-MCNC: 0.2 MG/DL

## 2022-06-21 PROCEDURE — 82570 ASSAY OF URINE CREATININE: CPT

## 2022-06-21 PROCEDURE — 82043 UR ALBUMIN QUANTITATIVE: CPT

## 2022-06-21 PROCEDURE — 99214 OFFICE O/P EST MOD 30 MIN: CPT | Performed by: PHYSICIAN ASSISTANT

## 2022-06-21 PROCEDURE — 81002 URINALYSIS NONAUTO W/O SCOPE: CPT | Performed by: PHYSICIAN ASSISTANT

## 2022-06-21 RX ORDER — GLIMEPIRIDE 2 MG/1
2 TABLET ORAL EVERY MORNING
Qty: 90 TABLET | Refills: 0 | Status: SHIPPED | OUTPATIENT
Start: 2022-06-21 | End: 2022-09-19

## 2022-06-21 RX ORDER — CHLORAL HYDRATE 500 MG
2000 CAPSULE ORAL NIGHTLY
COMMUNITY
End: 2024-01-30

## 2022-06-21 RX ORDER — FENOFIBRATE 145 MG/1
145 TABLET, COATED ORAL DAILY
Qty: 90 TABLET | Refills: 3 | Status: SHIPPED | OUTPATIENT
Start: 2022-06-21 | End: 2022-10-25

## 2022-06-21 RX ORDER — ATORVASTATIN CALCIUM 10 MG/1
10 TABLET, FILM COATED ORAL EVERY EVENING
Qty: 90 TABLET | Refills: 3 | Status: SHIPPED | OUTPATIENT
Start: 2022-06-21 | End: 2023-09-15 | Stop reason: SDUPTHER

## 2022-06-21 ASSESSMENT — PAIN SCALES - GENERAL: PAINLEVEL: NO PAIN

## 2022-06-21 ASSESSMENT — PATIENT HEALTH QUESTIONNAIRE - PHQ9: CLINICAL INTERPRETATION OF PHQ2 SCORE: 0

## 2022-06-21 ASSESSMENT — FIBROSIS 4 INDEX: FIB4 SCORE: 0.76

## 2022-06-21 NOTE — PROGRESS NOTES
Subjective:     CC:   Chief Complaint   Patient presents with   • Follow-Up       HPI:   Diogo presents today to discuss:    Foot lesion  Patient with left foot lesion.  He is not sure if it is a mole. He has had it for over a year.  He does have a dermatologist and has not had his annual skin check yet this year.    Diabetes mellitus (HCC)  Chronic, on metformin 1000 mg twice a day, semaglutide 7 mg daily.  On statin.  Due for urine microalbumin.  Has an appointment with his ophthalmologist next month.  Does not monitor blood sugar at home.    Mixed hyperlipidemia  Chronic, just recently started fenofibrate 145 mg daily, he did finish his previous prescription.  Could not afford prescription omega-3's so is taking fish oil 1000 mg nightly over-the-counter.    Urinary frequency  Patient admits to increased urinary frequency most notably when he drinks caffeine.      History reviewed. No pertinent past medical history.  Family History   Problem Relation Age of Onset   • Diabetes Mother    • Stroke Father    • Stroke Paternal Grandfather      Past Surgical History:   Procedure Laterality Date   • APPENDECTOMY       Social History     Tobacco Use   • Smoking status: Former Smoker     Packs/day: 0.00     Types: Cigarettes     Quit date: 2017     Years since quittin.5   • Smokeless tobacco: Former User   • Tobacco comment: States that he quit in 2017, previous he would smoke when he was stressed.   Vaping Use   • Vaping Use: Never used   Substance Use Topics   • Alcohol use: Yes     Alcohol/week: 1.8 oz     Types: 3 Standard drinks or equivalent per week     Comment: occasionally    • Drug use: Yes     Types: Marijuana, Inhaled     Comment: occasional     Social History     Social History Narrative    Born and raised in [place]    Moved from [facility]         Current Outpatient Medications Ordered in Epic   Medication Sig Dispense Refill   • Omega-3 Fatty Acids (FISH OIL) 1000 MG Cap  "capsule Take 2,000 mg by mouth every evening.     • Semaglutide 7 MG Tab Take 1 Tablet by mouth every day. 90 Tablet 1   • metformin (GLUCOPHAGE) 1000 MG tablet Take 1 Tablet by mouth 2 (two) times a day for 90 days. 180 Tablet 1   • atorvastatin (LIPITOR) 10 MG Tab Take 1 Tablet by mouth every evening. 90 Tablet 3   • fenofibrate (TRICOR) 145 MG Tab Take 1 Tablet by mouth every day. 90 Tablet 3   • Lancets Lancets order: Lancets for Accucheck Laura meter. Sig: use in am for FBS and prn ssx high or low sugar. #100 RF x 3 100 Each 3   • Alcohol Swabs ([employer]S PREP) 70 % Pads 1 Package by Other route 3 times a week. 100 Each 0   • ACCU-CHEK LAURA PLUS strip Check FBS and 2 hours post meal glucose 3-4 times a week. 100 Strip 1   • Coenzyme Q10 (COQ10 PO) Take  by mouth.     • Ascorbic Acid (VITAMIN C) 1000 MG Tab Take  by mouth.     • Blood Glucose Monitoring Suppl (ACCU-CHEK LAURA PLUS) w/Device Kit      • Multiple Vitamins-Minerals (MULTIVITAMIN ADULT PO) Take  by mouth.     • Calcium Polycarbophil (FIBER-LAX PO) Take  by mouth.     • Probiotic Product (PROBIOTIC DAILY PO) Take  by mouth.     • IBUPROFEN PO Take  by mouth.       No current Epic-ordered facility-administered medications on file.     Codeine      ROS: see hpi  Gen: no fevers/chills  Pulm: no sob, no cough  CV: no chest pain, no palpitations, no edema  GI: no nausea/vomiting, no diarrhea  Skin: no rash    Objective:   Exam:  /62 (BP Location: Left arm, Patient Position: Sitting, BP Cuff Size: Adult)   Pulse 87   Temp 36.6 °C (97.9 °F) (Temporal)   Ht 1.753 m (5' 9\")   Wt 92.1 kg (203 lb)   SpO2 96%   BMI 29.98 kg/m²    Body mass index is 29.98 kg/m².    Gen: Alert and oriented, No apparent distress.  HEENT: Head atraumatic, normocephalic. Pupils equal and round.  Neck: Neck is supple without lymphadenopathy.   Lungs: Normal effort, CTA bilaterally, no wheezes, rhonchi, or rales  CV: Regular rate and rhythm. No murmurs, rubs, or gallops.  Ext: No " clubbing, cyanosis, edema.  1 x 1 mm dark macule noted on plantar surface of left foot.    Assessment & Plan:     46 y.o. male with the following -     1. Type 2 diabetes mellitus with hyperglycemia, without long-term current use of insulin (HCC)  Chronic, Uncontrolled.  Refill medications.  Order updated labs.  If A1c continues to increase will add SGLT2 inhibitor.     Diabetes recommendations:  -Follow an 1800 calorie ADA diet and aim to get about half of your calories from carbohydrates. For an 1800 calorie diet, that would be around 150-200 grams of carbs a day. Exercise as tolerated; ideally 150 minutes of cardiovascular exercise a week, or 20 minutes a day.   -Monitor blood sugars at home and keep a log book. Check your glucose fasting every morning, before dinner, and 2 hours after dinner (or as otherwise recommended at your appointment). Call if glucose <70 or >300. Please send weekly readings of your blood glucose through [employer].  -Please check your feet frequently for any open wounds/ulcers, signs of infection, or changes in sensation/neuropathy.  -Ensure that you see your ophthalmologist for your annual eye exam to assess for diabetic retinopathy.    - Microalbumin Creat Ratio Urine (Clinic Collect); Future  - Diabetic Monofilament LE Exam  - HEMOGLOBIN A1C; Future  - Lipid Profile; Future  - Semaglutide 7 MG Tab; Take 1 Tablet by mouth every day.  Dispense: 90 Tablet; Refill: 1  - metformin (GLUCOPHAGE) 1000 MG tablet; Take 1 Tablet by mouth 2 (two) times a day for 90 days.  Dispense: 180 Tablet; Refill: 1    2. Low serum vitamin B12  - VITAMIN B12; Future    3. Mixed hyperlipidemia  Chronic, Uncontrolled.  Increase fish oil to 2000 mg nightly.  Continue fenofibrate and statin.  Recheck lipids in 6 to 8 weeks.  - Lipid Profile; Future  - Omega-3 Fatty Acids (FISH OIL) 1000 MG Cap capsule; Take 2,000 mg by mouth every evening.  - atorvastatin (LIPITOR) 10 MG Tab; Take 1 Tablet by mouth every evening.   Dispense: 90 Tablet; Refill: 3  - fenofibrate (TRICOR) 145 MG Tab; Take 1 Tablet by mouth every day.  Dispense: 90 Tablet; Refill: 3    4. Vitamin D deficiency  - VITAMIN D,25 HYDROXY; Future    5. Urinary frequency  Will check urinalysis and PSA.  May be related to hyperglycemia.  Limit caffeine use.  Urology evaluation if persists.  - POCT Urinalysis    6. Prostate cancer screening  - PROSTATE SPECIFIC AG SCREENING; Future    7. Foot lesion  Instructed patient to schedule an appointment with his dermatologist for annual skin check and evaluation of foot lesion.    Return in about 8 weeks (around 8/16/2022) for Follow-up labs/tests.    Jane Wells PA-C (Baker)  Physician Assistant Certified  [facility]    Please note that this dictation was created using voice recognition software. I have made every reasonable attempt to correct obvious errors, but I expect that there are errors of grammar and possibly content that I did not discover before finalizing the note.

## 2022-06-21 NOTE — ASSESSMENT & PLAN NOTE
Chronic, on metformin 1000 mg twice a day, semaglutide 7 mg daily.  On statin.  Due for urine microalbumin.  Has an appointment with his ophthalmologist next month.  Does not monitor blood sugar at home.

## 2022-06-21 NOTE — ASSESSMENT & PLAN NOTE
Chronic, just recently started fenofibrate 145 mg daily, he did finish his previous prescription.  Could not afford prescription omega-3's so is taking fish oil 1000 mg nightly over-the-counter.

## 2022-06-21 NOTE — PATIENT INSTRUCTIONS
It was a pleasure meeting with you today at [facility]!    Your medical history/records and medications were reviewed today.     Please review my practice information below:    If you have any prescription refill requests, please send them via [employer] or discuss with your provider at the start of your office visits. Please allow 3-5 business days for lab and testing review and you will be contacted via [employer] with those results, or if advised to make a follow up appointment regarding those results, then please do so.     Once resulted, your lab/test/imaging results will show up automatically in your MyChart. Please wait for my interpretation and recommendations prior to viewing your results to avoid any unnecessary confusion or misinterpretation. I will address all of the lab values that I interpret as abnormal and message you accordingly on your MyChart. I will always send you a message about your results even if they are normal. If you do not hear back from me within 5-7 business days after completing your tests, then please send me a message on [employer] so I can obtain your results (especially if you went to an outside lab or imaging center - [facility], [facility], etc).     If you have any additional questions or concerns beyond my interpretation of your results, please make an appointment with me to discuss in further detail.    Please only use the [employer] messaging system for questions regarding your most recent appointment or if advised to use otherwise (glucose or blood pressure reporting).     If you have any new problems or concerns, you must make an appointment to discuss. This includes any referral requests, lab requests (unless advised to notify me for pre-appt labs), medication side effects, or request for medication adjustments.     Please arrive 15 minutes prior to your appointment time to complete your check-in and intake with the medical assistant.      Thank you,    Jane Berumen) Russell  AUGUSTINA  Physician Assistant Certified  [facility]

## 2022-09-19 DIAGNOSIS — E11.69 TYPE 2 DIABETES MELLITUS WITH OTHER SPECIFIED COMPLICATION, WITHOUT LONG-TERM CURRENT USE OF INSULIN (HCC): ICD-10-CM

## 2022-09-19 RX ORDER — GLIMEPIRIDE 2 MG/1
TABLET ORAL
Qty: 90 TABLET | Refills: 0 | Status: SHIPPED | OUTPATIENT
Start: 2022-09-19 | End: 2024-01-30 | Stop reason: SDUPTHER

## 2022-10-23 DIAGNOSIS — E78.2 MIXED HYPERLIPIDEMIA: ICD-10-CM

## 2022-10-25 RX ORDER — FENOFIBRATE 145 MG/1
145 TABLET, COATED ORAL DAILY
Qty: 90 TABLET | Refills: 1 | Status: SHIPPED | OUTPATIENT
Start: 2022-10-25 | End: 2024-01-30 | Stop reason: SDUPTHER

## 2023-06-19 NOTE — PROGRESS NOTES
Lab Results   Component Value Date/Time    CHOLSTRLTOT 284 (H) 01/11/2020 11:45 AM    LDL see below 01/11/2020 11:45 AM    HDL see below 01/11/2020 11:45 AM    TRIGLYCERIDE 1382 (H) 01/11/2020 11:45 AM       Lab Results   Component Value Date/Time    SODIUM 137 02/13/2020 04:02 PM    POTASSIUM 4.1 02/13/2020 04:02 PM    CHLORIDE 105 02/13/2020 04:02 PM    CO2 23 02/13/2020 04:02 PM    GLUCOSE 119 (H) 02/13/2020 04:02 PM    BUN 14 02/13/2020 04:02 PM    CREATININE 1.17 02/13/2020 04:02 PM     Lab Results   Component Value Date/Time    ALKPHOSPHAT 66 02/13/2020 04:02 PM    ASTSGOT 26 02/13/2020 04:02 PM    ALTSGPT 45 02/13/2020 04:02 PM    TBILIRUBIN 0.8 02/13/2020 04:02 PM      1/11/2020:  Glycohemoglobin 0.0 - 5.6 % 11.2High       Tiff Young, APRN-C    
acuteness of illness

## 2023-09-15 DIAGNOSIS — E78.2 MIXED HYPERLIPIDEMIA: Primary | ICD-10-CM

## 2023-09-15 RX ORDER — ATORVASTATIN CALCIUM 10 MG/1
10 TABLET, FILM COATED ORAL EVERY EVENING
Qty: 90 TABLET | Refills: 0 | Status: SHIPPED | OUTPATIENT
Start: 2023-09-15 | End: 2024-01-30

## 2023-09-15 NOTE — TELEPHONE ENCOUNTER
Received request via: Pharmacy    Was the patient seen in the last year in this department? No    Does the patient have an active prescription (recently filled or refills available) for medication(s) requested? No    Does the patient have MCFP Plus and need 100 day supply (blood pressure, diabetes and cholesterol meds only)? Patient does not have SCP    Sent message to pt to make an appointment

## 2024-01-27 ENCOUNTER — HOSPITAL ENCOUNTER (OUTPATIENT)
Dept: LAB | Facility: MEDICAL CENTER | Age: 49
End: 2024-01-27
Attending: PHYSICIAN ASSISTANT
Payer: COMMERCIAL

## 2024-01-27 DIAGNOSIS — E53.8 LOW SERUM VITAMIN B12: ICD-10-CM

## 2024-01-27 DIAGNOSIS — Z12.5 PROSTATE CANCER SCREENING: ICD-10-CM

## 2024-01-27 DIAGNOSIS — E55.9 VITAMIN D DEFICIENCY: ICD-10-CM

## 2024-01-27 DIAGNOSIS — E78.2 MIXED HYPERLIPIDEMIA: ICD-10-CM

## 2024-01-27 DIAGNOSIS — E11.65 TYPE 2 DIABETES MELLITUS WITH HYPERGLYCEMIA, WITHOUT LONG-TERM CURRENT USE OF INSULIN (HCC): ICD-10-CM

## 2024-01-27 LAB
25(OH)D3 SERPL-MCNC: 29 NG/ML (ref 30–100)
CHOLEST SERPL-MCNC: 259 MG/DL (ref 100–199)
EST. AVERAGE GLUCOSE BLD GHB EST-MCNC: 240 MG/DL
HBA1C MFR BLD: 10 % (ref 4–5.6)
HDLC SERPL-MCNC: 32 MG/DL
LDLC SERPL CALC-MCNC: ABNORMAL MG/DL
PSA SERPL-MCNC: 0.75 NG/ML (ref 0–4)
TRIGL SERPL-MCNC: 867 MG/DL (ref 0–149)
VIT B12 SERPL-MCNC: 2493 PG/ML (ref 211–911)

## 2024-01-27 PROCEDURE — 83036 HEMOGLOBIN GLYCOSYLATED A1C: CPT

## 2024-01-27 PROCEDURE — 82607 VITAMIN B-12: CPT

## 2024-01-27 PROCEDURE — 80061 LIPID PANEL: CPT

## 2024-01-27 PROCEDURE — 36415 COLL VENOUS BLD VENIPUNCTURE: CPT

## 2024-01-27 PROCEDURE — 84153 ASSAY OF PSA TOTAL: CPT

## 2024-01-27 PROCEDURE — 82306 VITAMIN D 25 HYDROXY: CPT

## 2024-01-30 ENCOUNTER — HOSPITAL ENCOUNTER (OUTPATIENT)
Facility: MEDICAL CENTER | Age: 49
End: 2024-01-30
Attending: PHYSICIAN ASSISTANT
Payer: COMMERCIAL

## 2024-01-30 ENCOUNTER — OFFICE VISIT (OUTPATIENT)
Dept: MEDICAL GROUP | Facility: IMAGING CENTER | Age: 49
End: 2024-01-30
Payer: COMMERCIAL

## 2024-01-30 VITALS
HEIGHT: 69 IN | TEMPERATURE: 97.2 F | HEART RATE: 89 BPM | OXYGEN SATURATION: 95 % | DIASTOLIC BLOOD PRESSURE: 84 MMHG | BODY MASS INDEX: 29.18 KG/M2 | WEIGHT: 197 LBS | SYSTOLIC BLOOD PRESSURE: 136 MMHG

## 2024-01-30 DIAGNOSIS — E11.65 TYPE 2 DIABETES MELLITUS WITH HYPERGLYCEMIA, WITHOUT LONG-TERM CURRENT USE OF INSULIN (HCC): ICD-10-CM

## 2024-01-30 DIAGNOSIS — Z00.00 WELLNESS EXAMINATION: ICD-10-CM

## 2024-01-30 DIAGNOSIS — K76.0 FATTY LIVER: ICD-10-CM

## 2024-01-30 DIAGNOSIS — Z12.12 SCREENING FOR COLORECTAL CANCER: ICD-10-CM

## 2024-01-30 DIAGNOSIS — Z12.11 SCREENING FOR COLORECTAL CANCER: ICD-10-CM

## 2024-01-30 DIAGNOSIS — E55.9 VITAMIN D DEFICIENCY: ICD-10-CM

## 2024-01-30 DIAGNOSIS — Z13.0 SCREENING FOR DEFICIENCY ANEMIA: ICD-10-CM

## 2024-01-30 DIAGNOSIS — E78.2 MIXED HYPERLIPIDEMIA: ICD-10-CM

## 2024-01-30 PROBLEM — E53.8 LOW SERUM VITAMIN B12: Status: RESOLVED | Noted: 2022-03-17 | Resolved: 2024-01-30

## 2024-01-30 PROCEDURE — 3075F SYST BP GE 130 - 139MM HG: CPT | Performed by: PHYSICIAN ASSISTANT

## 2024-01-30 PROCEDURE — 3079F DIAST BP 80-89 MM HG: CPT | Performed by: PHYSICIAN ASSISTANT

## 2024-01-30 PROCEDURE — 82570 ASSAY OF URINE CREATININE: CPT

## 2024-01-30 PROCEDURE — 82043 UR ALBUMIN QUANTITATIVE: CPT

## 2024-01-30 PROCEDURE — 99396 PREV VISIT EST AGE 40-64: CPT | Performed by: PHYSICIAN ASSISTANT

## 2024-01-30 RX ORDER — ASPIRIN 81 MG/1
81 TABLET ORAL DAILY
COMMUNITY

## 2024-01-30 RX ORDER — ROSUVASTATIN CALCIUM 40 MG/1
40 TABLET, COATED ORAL DAILY
Qty: 30 TABLET | Refills: 3 | Status: CANCELLED | OUTPATIENT
Start: 2024-01-30

## 2024-01-30 RX ORDER — ERGOCALCIFEROL 1.25 MG/1
50000 CAPSULE ORAL
Qty: 8 CAPSULE | Refills: 0 | Status: SHIPPED | OUTPATIENT
Start: 2024-01-30

## 2024-01-30 RX ORDER — OMEGA-3-ACID ETHYL ESTERS 1 G/1
2 CAPSULE, LIQUID FILLED ORAL
Qty: 360 CAPSULE | Refills: 1 | Status: CANCELLED | OUTPATIENT
Start: 2024-01-30

## 2024-01-30 RX ORDER — FENOFIBRATE 145 MG/1
145 TABLET, COATED ORAL DAILY
Qty: 90 TABLET | Refills: 1 | Status: SHIPPED | OUTPATIENT
Start: 2024-01-30

## 2024-01-30 RX ORDER — ROSUVASTATIN CALCIUM 20 MG/1
20 TABLET, COATED ORAL EVERY EVENING
Qty: 90 TABLET | Refills: 3 | Status: SHIPPED | OUTPATIENT
Start: 2024-01-30

## 2024-01-30 RX ORDER — BLOOD-GLUCOSE SENSOR
EACH MISCELLANEOUS
Qty: 2 EACH | Refills: 5 | Status: SHIPPED | OUTPATIENT
Start: 2024-01-30

## 2024-01-30 RX ORDER — GLIMEPIRIDE 2 MG/1
2 TABLET ORAL EVERY MORNING
Qty: 90 TABLET | Refills: 0 | Status: SHIPPED | OUTPATIENT
Start: 2024-01-30

## 2024-01-30 ASSESSMENT — FIBROSIS 4 INDEX: FIB4 SCORE: 0.79

## 2024-01-30 ASSESSMENT — PATIENT HEALTH QUESTIONNAIRE - PHQ9: CLINICAL INTERPRETATION OF PHQ2 SCORE: 0

## 2024-01-30 NOTE — PROGRESS NOTES
"Subjective:     CC:   Chief Complaint   Patient presents with    Annual Exam       HPI:   Diogo presents today to discuss:    Type 2 diabetes mellitus with hyperglycemia, without long-term current use of insulin (HCC)  Chronic, uncontrolled.  Ran out of all of his medications several months ago.  Has not been following a balanced diet.  Has been eating a lot of foods that are high in sugar, carbohydrates, fat.  Has been taking vitamin C and fish oil over-the-counter.  Overdue for eye exam.  Notes urinary frequency when he drinks caffeine.    Mixed hyperlipidemia  Chronic, uncontrolled.  Has been taking over-the-counter fish oil.  Has not taken his prescription fenofibrate or atorvastatin in several months.  No showed his last 2 appointments.    Health Maintenance/Anticipatory Guidance:  Diet: snacking a lot, higher fat and sugar intake. Has been out of his medications for \"several months\".  Exercise: walking 1 mile per day, yoga once per week  Substance Abuse: no history  Safe in Relationship: yes  Sun Protection/Sunscreen: yes   Dermatologist: due  Dentist: twice yearly appointments  Eye Doctor: due  Seatbelts, helmets, and gun safety discussed    Cancer Screening:  Colorectal Cancer: due  Prostate Cancer: PSA WNL    Infectious Disease Screening:  STI/HIV screening: declined    Immunizations: due for hep b, prevnar, shingrix (unavailable in office today).    Past Medical History:   Diagnosis Date    Low serum vitamin B12 2022     Family History   Problem Relation Age of Onset    Diabetes Mother     Stroke Father     Stroke Paternal Grandfather      Past Surgical History:   Procedure Laterality Date    APPENDECTOMY       Social History     Tobacco Use    Smoking status: Former     Current packs/day: 0.00     Types: Cigarettes     Quit date: 2017     Years since quittin.1    Smokeless tobacco: Former    Tobacco comments:     States that he quit in 2017, previous he would smoke when he was stressed. "   Vaping Use    Vaping Use: Never used   Substance Use Topics    Alcohol use: Yes     Alcohol/week: 1.8 oz     Types: 3 Standard drinks or equivalent per week     Comment: occasionally     Drug use: Yes     Types: Marijuana, Inhaled     Comment: occasional     Social History     Social History Narrative    Born and raised in [place]    Moved from [facility]         Current Outpatient Medications Ordered in Epic   Medication Sig Dispense Refill    diclofenac CR (VOLTAREN-XR) 100 MG TABLET SR 24 HR tablet       vitamin D2, Ergocalciferol, (DRISDOL) 1.25 MG (95359 UT) Cap capsule Take 1 Capsule by mouth every 7 days. 8 Capsule 0    fenofibrate (TRICOR) 145 MG Tab Take 1 Tablet by mouth every day. 90 Tablet 1    Empagliflozin-metFORMIN HCl 5-1000 MG Tab Take 1 Tablet by mouth 2 times a day. 180 Tablet 0    rosuvastatin (CRESTOR) 20 MG Tab Take 1 Tablet by mouth every evening. 90 Tablet 3    Continuous Blood Gluc Sensor (FREESTYLE KARLENE 3 SENSOR) Misc Use as directed 2 Each 5    glimepiride (AMARYL) 2 MG Tab Take 1 Tablet by mouth every morning. 90 Tablet 0    aspirin 81 MG EC tablet Take 81 mg by mouth every day.      Lancets Lancets order: Lancets for Accucheck Laura meter. Sig: use in am for FBS and prn ssx high or low sugar. #100 RF x 3 100 Each 3    Alcohol Swabs (CVS PREP) 70 % Pads 1 Package by Other route 3 times a week. 100 Each 0    ACCU-CHEK LAURA PLUS strip Check FBS and 2 hours post meal glucose 3-4 times a week. 100 Strip 1    Coenzyme Q10 (COQ10 PO) Take  by mouth.      Ascorbic Acid (VITAMIN C) 1000 MG Tab Take  by mouth.      Blood Glucose Monitoring Suppl (ACCU-CHEK LAURA PLUS) w/Device Kit       Multiple Vitamins-Minerals (MULTIVITAMIN ADULT PO) Take  by mouth.      Calcium Polycarbophil (FIBER-LAX PO) Take  by mouth.      Probiotic Product (PROBIOTIC DAILY PO) Take  by mouth.      IBUPROFEN PO Take  by mouth.       No current Epic-ordered facility-administered medications on  "file.     Codeine    PMH/PSH/FH/Social history reviewed.  Vaccinations discussed.  Previous records and labs reviewed. Discussed age appropriate anticipatory guidance.    ROS: see hpi  Gen: no fevers/chills  Pulm: no sob, no cough  CV: no chest pain, no palpitations, no edema  GI: no nausea/vomiting, no diarrhea  Skin: no rash    Objective:   Exam:  /84 (BP Location: Right arm, Patient Position: Sitting, BP Cuff Size: Adult long)   Pulse 89   Temp 36.2 °C (97.2 °F) (Temporal)   Ht 1.753 m (5' 9\")   Wt 89.4 kg (197 lb)   SpO2 95%   BMI 29.09 kg/m²    Body mass index is 29.09 kg/m².    Gen: Alert and oriented, No apparent distress.  HEENT: Head atraumatic, normocephalic. Pupils equal and round.  Neck: Neck is supple without lymphadenopathy.   Lungs: Normal effort, CTA bilaterally, no wheezes, rhonchi, or rales  CV: Regular rate and rhythm. No murmurs, rubs, or gallops.  ABD: +BS. Non-tender, non-distended. No rebound, rigidity, or guarding.  Ext: No clubbing, cyanosis, edema.    Assessment & Plan:     48 y.o. male with the following -     1. Wellness examination  PMH/PSH/FH/Social history reviewed.  Vaccinations discussed.  Previous records and labs reviewed. Discussed age appropriate anticipatory guidance.     2. Type 2 diabetes mellitus with hyperglycemia, without long-term current use of insulin (HCC)  Chronic, uncontrolled.  Medical noncompliance.  Restart statin but will switch to rosuvastatin 20 mg daily.  Change metformin to combination with SGLT-2 inhibitor.  Restart glimepiride.  Declining GLP-1 analog injections.  May consider DPP 4 inhibitor once triglycerides have improved due to risk of pancreatitis.  Start aspirin 81 mg daily for primary prevention.  Diabetes recommendations:  -Follow an 1800 calorie ADA diet and aim to get about half of your calories from carbohydrates. For an 1800 calorie diet, that would be around 150-200 grams of carbs a day. Exercise as tolerated; ideally 150 minutes of " cardiovascular exercise a week, or 20 minutes a day.   -Monitor blood sugars at home and keep a log book. Check your glucose fasting every morning, before dinner, and 2 hours after dinner (or as otherwise recommended at your appointment). Call if glucose <70 or >300. Please send weekly readings of your blood glucose through [employer].  -Please check your feet frequently for any open wounds/ulcers, signs of infection, or changes in sensation/neuropathy.  -Ensure that you see your ophthalmologist for your annual eye exam to assess for diabetic retinopathy.  - Microalbumin Creat Ratio Urine (Clinic Collect); Future  - Comp Metabolic Panel; Future  - Referral for Retinal Screening Exam; Future  - Empagliflozin-metFORMIN HCl 5-1000 MG Tab; Take 1 Tablet by mouth 2 times a day.  Dispense: 180 Tablet; Refill: 0  - Continuous Blood Gluc Sensor (FREESTYLE KARLENE 3 SENSOR) [facility]; Use as directed  Dispense: 2 Each; Refill: 5  - glimepiride (AMARYL) 2 MG Tab; Take 1 Tablet by mouth every morning.  Dispense: 90 Tablet; Refill: 0    3. Fatty liver  Chronic, uncontrolled.  Restart fenofibrate and statin. Please continue working on lifestyle modifications.  There are 4 types of exercise that are recommended.  Endurance training includes 150 minutes or more of moderate-intensity activity each week as tolerated.  It is also recommended to incorporate exercises to help with flexibility and balance twice per week, and weight/resistance training twice per week.  The benefits of weight training include increased strength of the bones, muscles, and connective tissues which ultimately helps to stabilize your joints, lower risk of injury, decrease fall risk, improve your metabolism, and improve quality of life long-term.  If you are weightlifting twice a week, one day can be focus more on your lower body and the other on your upper body.  If you need assistance with a weight training program, please consult with a certified  or schedule  a consultation in my office for guidance.    Additionally, please follow a healthy diet that is low in saturated fat, sodium, and cholesterol, such as the mediterranean diet.  Please focus on a diet that is high in fruits, vegetables, whole grains, beans, nuts, and seeds, includes olive oil as an important source of monounsaturated fat. You may also consider a plant-based diet.  Please also increase your dietary fiber intake to 25 to 30 g a day and limit added sugar to <30 g/day.      In regards to alcohol intake, the 2020 to 2025 Dietary Guidelines for Americans advise no more than two drinks per day for males and one drink per day for nonpregnant females, although complete cessation would be ideal to reduce risks of long term health effects from alcohol.    Please consume at least 5-6 servings of fruits and vegetables per day. Studies show that consuming 5 servings of fruits and vegetables per day can increase life expectancy by 3 years.  Even eating 2.5 servings of fruits and vegetables per day can be associated with a 16% reduced risk of heart disease, 18% reduced risk of stroke, 4% reduced risk of cancer, and 15% reduced risk of premature death.    I would also recommend protein consumption with every meal.  Protein sources include beans, lentils, chickpeas, nuts and seeds, eggs, high protein yogurt (>15 g), cottage cheese, lean meats such as salmon and tuna, soy products i.e. tofu, as well as supplementary protein powders that are low in added sugar.    4. Mixed hyperlipidemia  Chronic, uncontrolled. Discussed risk of pancreatitis with elevated triglycerides.  Recommend low-fat diet and resumption of medications listed below.  - Lipid Profile; Future  - fenofibrate (TRICOR) 145 MG Tab; Take 1 Tablet by mouth every day.  Dispense: 90 Tablet; Refill: 1  - rosuvastatin (CRESTOR) 20 MG Tab; Take 1 Tablet by mouth every evening.  Dispense: 90 Tablet; Refill: 3    5. Vitamin D deficiency  - vitamin D2,  Ergocalciferol, (DRISDOL) 1.25 MG (67611 UT) Cap capsule; Take 1 Capsule by mouth every 7 days.  Dispense: 8 Capsule; Refill: 0    6. Screening for deficiency anemia  - CBC WITH DIFFERENTIAL; Future    7. Screening for colorectal cancer  - Referral to GI for Colonoscopy    Return in about 5 weeks (around 3/5/2024) for Medication recheck.    Jane Wells PA-C (Baker)  Physician Assistant Certified  [facility]    Please note that this dictation was created using voice recognition software. I have made every reasonable attempt to correct obvious errors, but I expect that there are errors of grammar and possibly content that I did not discover before finalizing the note.

## 2024-01-31 LAB
CREAT UR-MCNC: 32.87 MG/DL
MICROALBUMIN UR-MCNC: <1.2 MG/DL
MICROALBUMIN/CREAT UR: NORMAL MG/G (ref 0–30)

## 2024-01-31 NOTE — ASSESSMENT & PLAN NOTE
Chronic, uncontrolled.  Ran out of all of his medications several months ago.  Has not been following a balanced diet.  Has been eating a lot of foods that are high in sugar, carbohydrates, fat.  Has been taking vitamin C and fish oil over-the-counter.  Overdue for eye exam.  Notes urinary frequency when he drinks caffeine.

## 2024-01-31 NOTE — ASSESSMENT & PLAN NOTE
Chronic, uncontrolled.  Has been taking over-the-counter fish oil.  Has not taken his prescription fenofibrate or atorvastatin in several months.  No showed his last 2 appointments.

## 2024-01-31 NOTE — PATIENT INSTRUCTIONS
It was a pleasure meeting with you today at [facility]!    Your medical history/records and medications were reviewed today.     UPDATE on MyChart Results: If you have blood work, and/or imaging studies, or any other test or procedure completed, you will have access to results as soon as they become available in MyChart. Recently, these results will be available for review at the same time that your provider is able to see results!    This will likely mean you will see a result before your provider has had a chance to review and discuss with you.  Some results or care notes may be hard to understand and may be serious in nature.    We look at every result and your provider will contact you to explain what they mean and discuss appropriate next steps. Please allow for at least 72 business hours for chart and result review.     We prefer that you wait for your care team to contact you with your results.  Often, your provider will discuss your results with you at your next appointment. We look forward to continuing to partner with you in your care.    Please review my practice information below:    If you have any prescription refill requests, please send them via [employer] or discuss with your provider at the start of your office visits. Please allow 3-5 business days for lab and testing review and you will be contacted via [employer] with those results, or if advised to make a follow up appointment regarding those results, then please do so.     Once resulted, your lab/test/imaging results will show up automatically in your MyChart. Please wait for my interpretation and recommendations prior to viewing your results to avoid any unnecessary confusion or misinterpretation. I will address all of the lab values that I interpret as abnormal and message you accordingly on your MyChart. I will always send you a message about your results even if they are normal. If you do not hear back from me within 5-7 business  days after completing your tests, then please send me a message on [employer] so I can obtain your results (especially if you went to an outside lab or imaging center - [facility], [facility], etc).     If you have any additional questions or concerns beyond my interpretation of your results, please make an appointment with me to discuss in further detail.    Please only use the [employer] messaging system for questions regarding your most recent appointment or if advised to use otherwise (glucose or blood pressure reporting).     If you have any new problems or concerns, you must make an appointment to discuss. This includes any referral requests, lab requests (unless advised to notify me for pre-appt labs), medication side effects, or request for medication adjustments.     Please arrive 15 minutes prior to your appointment time to complete your check-in and intake with the medical assistant.      Thank you,    Jane Wells PA-C (Baker)  Physician Assistant Certified  [facility]    -----------------------------------------------------------------    Attn: Patients of [facility]:    In an effort to continue to provide excellent and efficient care to our patients, it is vital that we continue to use our resources appropriately. With that, this is a reminder that [employer] is used for prescription refill requests, test results, virtual visits, and chart review only.     Any new questions, concerns/conditions, lab/imaging requests, medication adjustments, new prescriptions, or referral requests do require an appointment (virtually or in person), unless discussed otherwise at your most recent appointment.     Thank you for your understanding,    [facility]    Please continue working on lifestyle modifications.  There are 4 types of exercise that are recommended.  Endurance training includes 150 minutes or more of moderate-intensity activity each week as tolerated.  It is also recommended to  incorporate exercises to help with flexibility and balance twice per week, and weight/resistance training twice per week.  The benefits of weight training include increased strength of the bones, muscles, and connective tissues which ultimately helps to stabilize your joints, lower risk of injury, decrease fall risk, improve your metabolism, and improve quality of life long-term.  If you are weightlifting twice a week, one day can be focus more on your lower body and the other on your upper body.  If you need assistance with a weight training program, please consult with a certified  or schedule a consultation in my office for guidance.    Additionally, please follow a healthy diet that is low in saturated fat, sodium, and cholesterol, such as the mediterranean diet.  Please focus on a diet that is high in fruits, vegetables, whole grains, beans, nuts, and seeds, includes olive oil as an important source of monounsaturated fat. You may also consider a plant-based diet.  Please also increase your dietary fiber intake to 25 to 30 g a day and limit added sugar to <30 g/day.      In regards to alcohol intake, the 2020 to 2025 Dietary Guidelines for Americans advise no more than two drinks per day for males and one drink per day for nonpregnant females, although complete cessation would be ideal to reduce risks of long term health effects from alcohol.    Please consume at least 5-6 servings of fruits and vegetables per day. Studies show that consuming 5 servings of fruits and vegetables per day can increase life expectancy by 3 years.  Even eating 2.5 servings of fruits and vegetables per day can be associated with a 16% reduced risk of heart disease, 18% reduced risk of stroke, 4% reduced risk of cancer, and 15% reduced risk of premature death.    I would also recommend protein consumption with every meal.  Protein sources include beans, lentils, chickpeas, nuts and seeds, eggs, high protein yogurt (>15 g),  cottage cheese, lean meats such as salmon and tuna, soy products i.e. tofu, as well as supplementary protein powders that are low in added sugar.

## 2024-03-14 ENCOUNTER — OFFICE VISIT (OUTPATIENT)
Dept: MEDICAL GROUP | Facility: IMAGING CENTER | Age: 49
End: 2024-03-14
Payer: COMMERCIAL

## 2024-03-14 VITALS
DIASTOLIC BLOOD PRESSURE: 78 MMHG | HEART RATE: 93 BPM | RESPIRATION RATE: 16 BRPM | OXYGEN SATURATION: 95 % | BODY MASS INDEX: 28.88 KG/M2 | SYSTOLIC BLOOD PRESSURE: 124 MMHG | HEIGHT: 69 IN | WEIGHT: 195 LBS | TEMPERATURE: 98 F

## 2024-03-14 DIAGNOSIS — E11.65 TYPE 2 DIABETES MELLITUS WITH HYPERGLYCEMIA, WITHOUT LONG-TERM CURRENT USE OF INSULIN (HCC): ICD-10-CM

## 2024-03-14 DIAGNOSIS — E78.2 MIXED HYPERLIPIDEMIA: ICD-10-CM

## 2024-03-14 DIAGNOSIS — Z23 NEED FOR VACCINATION: ICD-10-CM

## 2024-03-14 PROBLEM — Z56.6 STRESS AT WORK: Status: RESOLVED | Noted: 2022-02-07 | Resolved: 2024-03-14

## 2024-03-14 PROBLEM — L98.9 FOOT LESION: Status: RESOLVED | Noted: 2022-06-21 | Resolved: 2024-03-14

## 2024-03-14 PROBLEM — R35.0 URINARY FREQUENCY: Status: RESOLVED | Noted: 2022-06-21 | Resolved: 2024-03-14

## 2024-03-14 PROCEDURE — 1126F AMNT PAIN NOTED NONE PRSNT: CPT | Performed by: PHYSICIAN ASSISTANT

## 2024-03-14 PROCEDURE — 3074F SYST BP LT 130 MM HG: CPT | Performed by: PHYSICIAN ASSISTANT

## 2024-03-14 PROCEDURE — 90472 IMMUNIZATION ADMIN EACH ADD: CPT | Performed by: PHYSICIAN ASSISTANT

## 2024-03-14 PROCEDURE — 99214 OFFICE O/P EST MOD 30 MIN: CPT | Mod: 25 | Performed by: PHYSICIAN ASSISTANT

## 2024-03-14 PROCEDURE — 90677 PCV20 VACCINE IM: CPT | Performed by: PHYSICIAN ASSISTANT

## 2024-03-14 PROCEDURE — 90746 HEPB VACCINE 3 DOSE ADULT IM: CPT | Performed by: PHYSICIAN ASSISTANT

## 2024-03-14 PROCEDURE — 3078F DIAST BP <80 MM HG: CPT | Performed by: PHYSICIAN ASSISTANT

## 2024-03-14 PROCEDURE — 90471 IMMUNIZATION ADMIN: CPT | Performed by: PHYSICIAN ASSISTANT

## 2024-03-14 ASSESSMENT — FIBROSIS 4 INDEX: FIB4 SCORE: 0.79

## 2024-03-14 ASSESSMENT — PAIN SCALES - GENERAL: PAINLEVEL: NO PAIN

## 2024-03-14 NOTE — ASSESSMENT & PLAN NOTE
Chronic, uncontrolled.  Only recently was able to start the Synjardy and glimepiride, as he was having difficulty with the pharmacy.  He also just started the fenofibrate and rosuvastatin.  Has not completed his follow-up labs yet.  Does not check glucose , but did  the continuous glucose monitor. He has yet to apply it to his arm.

## 2024-03-14 NOTE — ASSESSMENT & PLAN NOTE
Chronic, uncontrolled.  Patient trying to make dietary changes.  Just started taking fenofibrate and rosuvastatin.  Has not started aspirin 81 mg daily for primary prevention.

## 2024-03-14 NOTE — PROGRESS NOTES
Subjective:     CC:   Chief Complaint   Patient presents with    Follow-Up     On medication        HPI:   Diogo presents today to discuss:    Type 2 diabetes mellitus with hyperglycemia, without long-term current use of insulin (HCC)  Chronic, uncontrolled.  Only recently was able to start the Synjardy and glimepiride, as he was having difficulty with the pharmacy.  He also just started the fenofibrate and rosuvastatin.  Has not completed his follow-up labs yet.  Does not check glucose , but did  the continuous glucose monitor. He has yet to apply it to his arm.    Mixed hyperlipidemia  Chronic, uncontrolled.  Patient trying to make dietary changes.  Just started taking fenofibrate and rosuvastatin.  Has not started aspirin 81 mg daily for primary prevention.    Past Medical History:   Diagnosis Date    Low serum vitamin B12 2022     Family History   Problem Relation Age of Onset    Diabetes Mother     Stroke Father     Stroke Paternal Grandfather      Past Surgical History:   Procedure Laterality Date    APPENDECTOMY       Social History     Tobacco Use    Smoking status: Former     Current packs/day: 0.00     Types: Cigarettes     Quit date: 2017     Years since quittin.2    Smokeless tobacco: Former    Tobacco comments:     States that he quit in 2017, previous he would smoke when he was stressed.   Vaping Use    Vaping Use: Never used   Substance Use Topics    Alcohol use: Yes     Alcohol/week: 1.8 oz     Types: 3 Standard drinks or equivalent per week     Comment: occasionally     Drug use: Yes     Types: Marijuana, Inhaled     Comment: occasional     Social History     Social History Narrative    Born and raised in [place]    Moved from [facility]         Current Outpatient Medications Ordered in Epic   Medication Sig Dispense Refill    diclofenac CR (VOLTAREN-XR) 100 MG TABLET SR 24 HR tablet       vitamin D2, Ergocalciferol, (DRISDOL) 1.25 MG (43870 UT) Cap  "capsule Take 1 Capsule by mouth every 7 days. 8 Capsule 0    fenofibrate (TRICOR) 145 MG Tab Take 1 Tablet by mouth every day. 90 Tablet 1    Empagliflozin-metFORMIN HCl 5-1000 MG Tab Take 1 Tablet by mouth 2 times a day. 180 Tablet 0    rosuvastatin (CRESTOR) 20 MG Tab Take 1 Tablet by mouth every evening. 90 Tablet 3    Continuous Blood Gluc Sensor (FREESTYLE KARLENE 3 SENSOR) Misc Use as directed 2 Each 5    glimepiride (AMARYL) 2 MG Tab Take 1 Tablet by mouth every morning. 90 Tablet 0    aspirin 81 MG EC tablet Take 81 mg by mouth every day.      Lancets Lancets order: Lancets for Accucheck Laura meter. Sig: use in am for FBS and prn ssx high or low sugar. #100 RF x 3 100 Each 3    Alcohol Swabs (CVS PREP) 70 % Pads 1 Package by Other route 3 times a week. 100 Each 0    ACCU-CHEK LAURA PLUS strip Check FBS and 2 hours post meal glucose 3-4 times a week. 100 Strip 1    Coenzyme Q10 (COQ10 PO) Take  by mouth.      Blood Glucose Monitoring Suppl (ACCU-CHEK LAURA PLUS) w/Device Kit       Multiple Vitamins-Minerals (MULTIVITAMIN ADULT PO) Take  by mouth.      Calcium Polycarbophil (FIBER-LAX PO) Take  by mouth.      Probiotic Product (PROBIOTIC DAILY PO) Take  by mouth.      IBUPROFEN PO Take  by mouth.      Ascorbic Acid (VITAMIN C) 1000 MG Tab Take  by mouth.       No current Epic-ordered facility-administered medications on file.     Codeine    PMH/PSH/FH/Social history reviewed.  Vaccinations discussed.  Previous records and labs reviewed. Discussed age appropriate anticipatory guidance.    ROS: see hpi  Gen: no fevers/chills  Pulm: no sob, no cough  CV: no chest pain, no palpitations, no edema  GI: no nausea/vomiting, no diarrhea  Skin: no rash    Objective:   Exam:  /78 (BP Location: Right arm, Patient Position: Sitting, BP Cuff Size: Adult)   Pulse 93   Temp 36.7 °C (98 °F) (Temporal)   Resp 16   Ht 1.753 m (5' 9\")   Wt 88.5 kg (195 lb)   SpO2 95%   BMI 28.80 kg/m²    Body mass index is 28.8 " kg/m².    Gen: Alert and oriented, No apparent distress.  HEENT: Head atraumatic, normocephalic. Pupils equal and round.  Neck: Neck is supple without lymphadenopathy.   Lungs: Normal effort, CTA bilaterally, no wheezes, rhonchi, or rales  CV: Regular rate and rhythm. No murmurs, rubs, or gallops.  Ext: No clubbing, cyanosis, edema.    Monofilament testing with a 10 gram force: sensation intact: intact bilaterally  Visual Inspection: Feet without maceration, ulcers, fissures.  Pedal pulses: intact bilaterally    Assessment & Plan:     48 y.o. male with the following -     1. Type 2 diabetes mellitus with hyperglycemia, without long-term current use of insulin (HCC)  Chronic, uncontrolled.  Will check labs in 6 to 8 weeks, then follow-up in office.  Bring in continuous glucose monitor report to review next visit.  - Diabetic Monofilament LE Exam  - POCT Retinal Eye Exam  - HEMOGLOBIN A1C; Future  - Comp Metabolic Panel; Future    2. Mixed hyperlipidemia  Chronic, uncontrolled.  Check labs once on cholesterol medications for at least 6 to 8 weeks.  Start aspirin 81 mg daily for primary prevention.  - CBC WITH DIFFERENTIAL; Future  - Lipid Profile; Future    3. Need for vaccination  - Pneumococcal Conjugate Vaccine 20-Valent (6 wks+)  - Hepatitis B Vaccine Adult 20+    Return in about 9 weeks (around 5/16/2024) for Follow-up labs/tests, Medication recheck, 2nd Hep B.    Jane Wells PA-C (Baker)  Physician Assistant Certified  [facility]    Please note that this dictation was created using voice recognition software. I have made every reasonable attempt to correct obvious errors, but I expect that there are errors of grammar and possibly content that I did not discover before finalizing the note.

## 2024-03-14 NOTE — PATIENT INSTRUCTIONS
It was a pleasure meeting with you today at [facility]!    Your medical history/records and medications were reviewed today.     UPDATE on MyChart Results: If you have blood work, and/or imaging studies, or any other test or procedure completed, you will have access to results as soon as they become available in MyChart. Recently, these results will be available for review at the same time that your provider is able to see results!    This will likely mean you will see a result before your provider has had a chance to review and discuss with you.  Some results or care notes may be hard to understand and may be serious in nature.    We look at every result and your provider will contact you to explain what they mean and discuss appropriate next steps. Please allow for at least 72 business hours for chart and result review.     We prefer that you wait for your care team to contact you with your results.  Often, your provider will discuss your results with you at your next appointment. We look forward to continuing to partner with you in your care.    Please review my practice information below:    If you have any prescription refill requests, please send them via [employer] or discuss with your provider at the start of your office visits. Please allow 3-5 business days for lab and testing review and you will be contacted via [employer] with those results, or if advised to make a follow up appointment regarding those results, then please do so.     Once resulted, your lab/test/imaging results will show up automatically in your MyChart. Please wait for my interpretation and recommendations prior to viewing your results to avoid any unnecessary confusion or misinterpretation. I will address all of the lab values that I interpret as abnormal and message you accordingly on your MyChart. I will always send you a message about your results even if they are normal. If you do not hear back from me within 5-7 business  days after completing your tests, then please send me a message on [employer] so I can obtain your results (especially if you went to an outside lab or imaging center - [facility], [facility], etc).     If you have any additional questions or concerns beyond my interpretation of your results, please make an appointment with me to discuss in further detail.    Please only use the [employer] messaging system for questions regarding your most recent appointment or if advised to use otherwise (glucose or blood pressure reporting).     If you have any new problems or concerns, you must make an appointment to discuss. This includes any referral requests, lab requests (unless advised to notify me for pre-appt labs), medication side effects, or request for medication adjustments.     Please arrive 15 minutes prior to your appointment time to complete your check-in and intake with the medical assistant.      Thank you,    Jane Wells PA-C (Baker)  Physician Assistant Certified  [facility]    -----------------------------------------------------------------    Attn: Patients of [facility]:    In an effort to continue to provide excellent and efficient care to our patients, it is vital that we continue to use our resources appropriately. With that, this is a reminder that [employer] is used for prescription refill requests, test results, virtual visits, and chart review only.     Any new questions, concerns/conditions, lab/imaging requests, medication adjustments, new prescriptions, or referral requests do require an appointment (virtually or in person), unless discussed otherwise at your most recent appointment.     Thank you for your understanding,    [facility]

## 2024-03-21 LAB — RETINAL SCREEN: NEGATIVE

## 2024-04-01 ENCOUNTER — APPOINTMENT (OUTPATIENT)
Dept: MEDICAL GROUP | Facility: IMAGING CENTER | Age: 49
End: 2024-04-01
Payer: COMMERCIAL

## 2024-04-01 VITALS
WEIGHT: 195.8 LBS | TEMPERATURE: 97.7 F | BODY MASS INDEX: 29 KG/M2 | HEIGHT: 69 IN | RESPIRATION RATE: 17 BRPM | HEART RATE: 80 BPM | SYSTOLIC BLOOD PRESSURE: 122 MMHG | OXYGEN SATURATION: 95 % | DIASTOLIC BLOOD PRESSURE: 68 MMHG

## 2024-04-01 DIAGNOSIS — N52.9 ERECTILE DYSFUNCTION, UNSPECIFIED ERECTILE DYSFUNCTION TYPE: ICD-10-CM

## 2024-04-01 PROCEDURE — 99213 OFFICE O/P EST LOW 20 MIN: CPT | Performed by: PHYSICIAN ASSISTANT

## 2024-04-01 PROCEDURE — 1126F AMNT PAIN NOTED NONE PRSNT: CPT | Performed by: PHYSICIAN ASSISTANT

## 2024-04-01 PROCEDURE — 3078F DIAST BP <80 MM HG: CPT | Performed by: PHYSICIAN ASSISTANT

## 2024-04-01 PROCEDURE — 3074F SYST BP LT 130 MM HG: CPT | Performed by: PHYSICIAN ASSISTANT

## 2024-04-01 RX ORDER — SILDENAFIL 100 MG/1
50-100 TABLET, FILM COATED ORAL
Qty: 10 TABLET | Refills: 0 | Status: SHIPPED | OUTPATIENT
Start: 2024-04-01 | End: 2024-04-01

## 2024-04-01 RX ORDER — TADALAFIL 10 MG/1
10 TABLET ORAL
Qty: 10 TABLET | Refills: 3 | Status: CANCELLED | OUTPATIENT
Start: 2024-04-01

## 2024-04-01 RX ORDER — SILDENAFIL 100 MG/1
50-100 TABLET, FILM COATED ORAL
Qty: 10 TABLET | Refills: 5 | Status: SHIPPED | OUTPATIENT
Start: 2024-04-01 | End: 2024-04-11 | Stop reason: SDUPTHER

## 2024-04-01 ASSESSMENT — PAIN SCALES - GENERAL: PAINLEVEL: NO PAIN

## 2024-04-01 NOTE — ASSESSMENT & PLAN NOTE
Patient admits to difficulty obtaining and maintaining an erection.  Interested in medication options.  Has never taken medications for ED.  Denies any headaches, chest pain, palpitations.  Blood pressure controlled.  History of high cholesterol.  PSA within normal limits.  No decreased urinary stream or incomplete bladder emptying.

## 2024-04-01 NOTE — PROGRESS NOTES
Subjective:     CC:   Chief Complaint   Patient presents with    Follow-Up     On medication        HPI:   Diogo presents today to discuss:    Erectile dysfunction  Patient admits to difficulty obtaining and maintaining an erection.  Interested in medication options.  Has never taken medications for ED.  Denies any headaches, chest pain, palpitations.  Blood pressure controlled.  History of high cholesterol.  PSA within normal limits.  No decreased urinary stream or incomplete bladder emptying.      Past Medical History:   Diagnosis Date    Low serum vitamin B12 2022     Family History   Problem Relation Age of Onset    Diabetes Mother     Stroke Father     Stroke Paternal Grandfather      Past Surgical History:   Procedure Laterality Date    APPENDECTOMY       Social History     Tobacco Use    Smoking status: Former     Current packs/day: 0.00     Types: Cigarettes     Quit date: 2017     Years since quittin.3    Smokeless tobacco: Former    Tobacco comments:     States that he quit in 2017, previous he would smoke when he was stressed.   Vaping Use    Vaping Use: Never used   Substance Use Topics    Alcohol use: Yes     Alcohol/week: 1.8 oz     Types: 3 Standard drinks or equivalent per week     Comment: occasionally     Drug use: Yes     Types: Marijuana, Inhaled     Comment: occasional     Social History     Social History Narrative    Born and raised in [place]    Moved from [facility] - special ed         Current Outpatient Medications Ordered in Epic   Medication Sig Dispense Refill    sildenafil citrate (VIAGRA) 100 MG tablet Take 0.5-1 Tablets by mouth 1 time a day as needed for Erectile Dysfunction. 10 Tablet 5    diclofenac CR (VOLTAREN-XR) 100 MG TABLET SR 24 HR tablet       vitamin D2, Ergocalciferol, (DRISDOL) 1.25 MG (74901 UT) Cap capsule Take 1 Capsule by mouth every 7 days. 8 Capsule 0    fenofibrate (TRICOR) 145 MG Tab Take 1 Tablet by mouth every day. 90 Tablet 1     "Empagliflozin-metFORMIN HCl 5-1000 MG Tab Take 1 Tablet by mouth 2 times a day. 180 Tablet 0    rosuvastatin (CRESTOR) 20 MG Tab Take 1 Tablet by mouth every evening. 90 Tablet 3    Continuous Blood Gluc Sensor (FREESTYLE KARLENE 3 SENSOR) Misc Use as directed 2 Each 5    glimepiride (AMARYL) 2 MG Tab Take 1 Tablet by mouth every morning. 90 Tablet 0    aspirin 81 MG EC tablet Take 81 mg by mouth every day.      Lancets Lancets order: Lancets for Accucheck Laura meter. Sig: use in am for FBS and prn ssx high or low sugar. #100 RF x 3 100 Each 3    Alcohol Swabs (CVS PREP) 70 % Pads 1 Package by Other route 3 times a week. 100 Each 0    ACCU-CHEK LAURA PLUS strip Check FBS and 2 hours post meal glucose 3-4 times a week. 100 Strip 1    Coenzyme Q10 (COQ10 PO) Take  by mouth.      Ascorbic Acid (VITAMIN C) 1000 MG Tab Take  by mouth.      Blood Glucose Monitoring Suppl (ACCU-CHEK LAURA PLUS) w/Device Kit       Multiple Vitamins-Minerals (MULTIVITAMIN ADULT PO) Take  by mouth.      Calcium Polycarbophil (FIBER-LAX PO) Take  by mouth.      Probiotic Product (PROBIOTIC DAILY PO) Take  by mouth.      IBUPROFEN PO Take  by mouth.       No current Epic-ordered facility-administered medications on file.     Codeine    PMH/PSH/FH/Social history reviewed.  Vaccinations discussed.  Previous records and labs reviewed. Discussed age appropriate anticipatory guidance.    ROS: see hpi  Gen: no fevers/chills  Pulm: no sob, no cough  CV: no chest pain, no palpitations, no edema  GI: no nausea/vomiting, no diarrhea  Skin: no rash    Objective:   Exam:  /68 (BP Location: Right arm, Patient Position: Sitting, BP Cuff Size: Adult)   Pulse 80   Temp 36.5 °C (97.7 °F) (Temporal)   Resp 17   Ht 1.753 m (5' 9\")   Wt 88.8 kg (195 lb 12.8 oz)   SpO2 95%   BMI 28.91 kg/m²    Body mass index is 28.91 kg/m².    Gen: Alert and oriented, No apparent distress.  HEENT: Head atraumatic, normocephalic. Pupils equal and round.  Lungs: Normal " effort, CTA bilaterally, no wheezes, rhonchi, or rales  CV: Regular rate and rhythm. No murmurs, rubs, or gallops.  Ext: No clubbing, cyanosis, edema.    Assessment & Plan:     48 y.o. male with the following -     1. Erectile dysfunction, unspecified erectile dysfunction type  Chronic, uncontrolled.  Will prescribe Viagra, take 1/2-1 full tab 30 to 60 minutes prior to sexual activity.  Side effects may include headache and flushing.  - sildenafil citrate (VIAGRA) 100 MG tablet; Take 0.5-1 Tablets by mouth 1 time a day as needed for Erectile Dysfunction.  Dispense: 10 Tablet; Refill: 5    Return for As scheduled.    Jane Wells PA-C (Baker)  Physician Assistant Certified  [facility]    Please note that this dictation was created using voice recognition software. I have made every reasonable attempt to correct obvious errors, but I expect that there are errors of grammar and possibly content that I did not discover before finalizing the note.

## 2024-04-01 NOTE — PATIENT INSTRUCTIONS
It was a pleasure meeting with you today at [facility]!    Your medical history/records and medications were reviewed today.     UPDATE on MyChart Results: If you have blood work, and/or imaging studies, or any other test or procedure completed, you will have access to results as soon as they become available in MyChart. Recently, these results will be available for review at the same time that your provider is able to see results!    This will likely mean you will see a result before your provider has had a chance to review and discuss with you.  Some results or care notes may be hard to understand and may be serious in nature.    We look at every result and your provider will contact you to explain what they mean and discuss appropriate next steps. Please allow for at least 72 business hours for chart and result review.     We prefer that you wait for your care team to contact you with your results.  Often, your provider will discuss your results with you at your next appointment. We look forward to continuing to partner with you in your care.    Please review my practice information below:    If you have any prescription refill requests, please send them via [employer] or discuss with your provider at the start of your office visits. Please allow 3-5 business days for lab and testing review and you will be contacted via [employer] with those results, or if advised to make a follow up appointment regarding those results, then please do so.     Once resulted, your lab/test/imaging results will show up automatically in your MyChart. Please wait for my interpretation and recommendations prior to viewing your results to avoid any unnecessary confusion or misinterpretation. I will address all of the lab values that I interpret as abnormal and message you accordingly on your MyChart. I will always send you a message about your results even if they are normal. If you do not hear back from me within 5-7 business  days after completing your tests, then please send me a message on [employer] so I can obtain your results (especially if you went to an outside lab or imaging center - [facility], [facility], etc).     If you have any additional questions or concerns beyond my interpretation of your results, please make an appointment with me to discuss in further detail.    Please only use the [employer] messaging system for questions regarding your most recent appointment or if advised to use otherwise (glucose or blood pressure reporting).     If you have any new problems or concerns, you must make an appointment to discuss. This includes any referral requests, lab requests (unless advised to notify me for pre-appt labs), medication side effects, or request for medication adjustments.     Please arrive 15 minutes prior to your appointment time to complete your check-in and intake with the medical assistant.      Thank you,    Jane Wells PA-C (Baker)  Physician Assistant Certified  [facility]    -----------------------------------------------------------------    Attn: Patients of [facility]:    In an effort to continue to provide excellent and efficient care to our patients, it is vital that we continue to use our resources appropriately. With that, this is a reminder that [employer] is used for prescription refill requests, test results, virtual visits, and chart review only.     Any new questions, concerns/conditions, lab/imaging requests, medication adjustments, new prescriptions, or referral requests do require an appointment (virtually or in person), unless discussed otherwise at your most recent appointment.     Thank you for your understanding,    [facility]

## 2024-04-11 DIAGNOSIS — N52.9 ERECTILE DYSFUNCTION, UNSPECIFIED ERECTILE DYSFUNCTION TYPE: ICD-10-CM

## 2024-04-11 RX ORDER — SILDENAFIL 100 MG/1
50-100 TABLET, FILM COATED ORAL
Qty: 10 TABLET | Refills: 5 | Status: SHIPPED | OUTPATIENT
Start: 2024-04-11

## 2024-04-11 NOTE — PROGRESS NOTES
PCP is out of office  Patient request sildenafil send to [facility] location on [address].

## 2024-04-25 DIAGNOSIS — E55.9 VITAMIN D DEFICIENCY: ICD-10-CM

## 2024-04-25 RX ORDER — ERGOCALCIFEROL 1.25 MG/1
50000 CAPSULE ORAL
Qty: 8 CAPSULE | Refills: 0 | Status: SHIPPED | OUTPATIENT
Start: 2024-04-25

## 2024-04-26 NOTE — TELEPHONE ENCOUNTER
Received request via: Pharmacy    Was the patient seen in the last year in this department? Yes    Does the patient have an active prescription (recently filled or refills available) for medication(s) requested? No    [facility]     Does the patient have longterm Plus and need 100 day supply (blood pressure, diabetes and cholesterol meds only)? Patient does not have SCP

## 2024-05-14 ENCOUNTER — OFFICE VISIT (OUTPATIENT)
Dept: MEDICAL GROUP | Facility: IMAGING CENTER | Age: 49
End: 2024-05-14
Payer: COMMERCIAL

## 2024-05-14 VITALS
RESPIRATION RATE: 16 BRPM | HEIGHT: 69 IN | SYSTOLIC BLOOD PRESSURE: 118 MMHG | WEIGHT: 199 LBS | DIASTOLIC BLOOD PRESSURE: 76 MMHG | OXYGEN SATURATION: 94 % | BODY MASS INDEX: 29.47 KG/M2 | TEMPERATURE: 97.6 F | HEART RATE: 80 BPM

## 2024-05-14 DIAGNOSIS — N52.9 ERECTILE DYSFUNCTION, UNSPECIFIED ERECTILE DYSFUNCTION TYPE: ICD-10-CM

## 2024-05-14 DIAGNOSIS — Z13.0 SCREENING FOR DEFICIENCY ANEMIA: ICD-10-CM

## 2024-05-14 DIAGNOSIS — Z11.59 NEED FOR HEPATITIS C SCREENING TEST: ICD-10-CM

## 2024-05-14 DIAGNOSIS — E55.9 VITAMIN D DEFICIENCY: ICD-10-CM

## 2024-05-14 DIAGNOSIS — E78.2 MIXED HYPERLIPIDEMIA: ICD-10-CM

## 2024-05-14 DIAGNOSIS — E11.65 TYPE 2 DIABETES MELLITUS WITH HYPERGLYCEMIA, WITHOUT LONG-TERM CURRENT USE OF INSULIN (HCC): ICD-10-CM

## 2024-05-14 LAB
HBA1C MFR BLD: 7.6 % (ref ?–5.8)
POCT INT CON NEG: NEGATIVE
POCT INT CON POS: POSITIVE

## 2024-05-14 PROCEDURE — 83036 HEMOGLOBIN GLYCOSYLATED A1C: CPT | Performed by: PHYSICIAN ASSISTANT

## 2024-05-14 PROCEDURE — 3078F DIAST BP <80 MM HG: CPT | Performed by: PHYSICIAN ASSISTANT

## 2024-05-14 PROCEDURE — 3074F SYST BP LT 130 MM HG: CPT | Performed by: PHYSICIAN ASSISTANT

## 2024-05-14 PROCEDURE — 1126F AMNT PAIN NOTED NONE PRSNT: CPT | Performed by: PHYSICIAN ASSISTANT

## 2024-05-14 PROCEDURE — 99214 OFFICE O/P EST MOD 30 MIN: CPT | Performed by: PHYSICIAN ASSISTANT

## 2024-05-14 RX ORDER — ROSUVASTATIN CALCIUM 20 MG/1
20 TABLET, COATED ORAL EVERY EVENING
Qty: 90 TABLET | Refills: 3 | Status: SHIPPED | OUTPATIENT
Start: 2024-05-14

## 2024-05-14 RX ORDER — FENOFIBRATE 145 MG/1
145 TABLET, COATED ORAL DAILY
Qty: 90 TABLET | Refills: 1 | Status: SHIPPED | OUTPATIENT
Start: 2024-05-14

## 2024-05-14 RX ORDER — GLIMEPIRIDE 2 MG/1
2 TABLET ORAL EVERY MORNING
Qty: 90 TABLET | Refills: 0 | Status: SHIPPED | OUTPATIENT
Start: 2024-05-14 | End: 2024-05-20

## 2024-05-14 ASSESSMENT — PAIN SCALES - GENERAL: PAINLEVEL: NO PAIN

## 2024-05-14 NOTE — ASSESSMENT & PLAN NOTE
Chronic, patient states he is compliant with fenofibrate and rosuvastatin.  Has not completed his lab testing yet.  Has not been changing his diet.

## 2024-05-14 NOTE — PROGRESS NOTES
Subjective:     CC:   Chief Complaint   Patient presents with    Follow-Up       HPI:   Diogo presents today to discuss:    Erectile dysfunction  Patient states that Viagra has not been effective for his erectile dysfunction.  No nocturia or urinary frequency.  Has diabetes and hyperlipidemia.    Mixed hyperlipidemia  Chronic, patient states he is compliant with fenofibrate and rosuvastatin.  Has not completed his lab testing yet.  Has not been changing his diet.    Type 2 diabetes mellitus with hyperglycemia, without long-term current use of insulin (HCC)  Chronic, compliant with Synjardy and glimepiride.  Does not check blood sugar at home.  No vision changes, neuropathy.      Past Medical History:   Diagnosis Date    Low serum vitamin B12 2022     Family History   Problem Relation Age of Onset    Diabetes Mother     Stroke Father     Stroke Paternal Grandfather      Past Surgical History:   Procedure Laterality Date    APPENDECTOMY       Social History     Tobacco Use    Smoking status: Former     Current packs/day: 0.00     Types: Cigarettes     Quit date: 2017     Years since quittin.4    Smokeless tobacco: Former    Tobacco comments:     States that he quit in 2017, previous he would smoke when he was stressed.   Vaping Use    Vaping Use: Never used   Substance Use Topics    Alcohol use: Yes     Alcohol/week: 1.8 oz     Types: 3 Standard drinks or equivalent per week     Comment: occasionally     Drug use: Yes     Types: Marijuana, Inhaled     Comment: occasional     Social History     Social History Narrative    Born and raised in [place]    Moved from [facility]         Current Outpatient Medications Ordered in Epic   Medication Sig Dispense Refill    glimepiride (AMARYL) 2 MG Tab Take 1 Tablet by mouth every morning. 90 Tablet 0    fenofibrate (TRICOR) 145 MG Tab Take 1 Tablet by mouth every day. 90 Tablet 1    Empagliflozin-metFORMIN HCl 5-1000 MG Tab Take 1 Tablet by  "mouth 2 times a day. 180 Tablet 0    rosuvastatin (CRESTOR) 20 MG Tab Take 1 Tablet by mouth every evening. 90 Tablet 3    vitamin D2, Ergocalciferol, (DRISDOL) 1.25 MG (15046 UT) Cap capsule TAKE 1 CAPSULE BY MOUTH ONE TIME PER WEEK 8 Capsule 0    sildenafil citrate (VIAGRA) 100 MG tablet Take 0.5-1 Tablets by mouth 1 time a day as needed for Erectile Dysfunction. 10 Tablet 5    diclofenac CR (VOLTAREN-XR) 100 MG TABLET SR 24 HR tablet       Continuous Blood Gluc Sensor (FREESTYLE KARLENE 3 SENSOR) Misc Use as directed 2 Each 5    aspirin 81 MG EC tablet Take 81 mg by mouth every day.      Lancets Lancets order: Lancets for Accucheck Laura meter. Sig: use in am for FBS and prn ssx high or low sugar. #100 RF x 3 100 Each 3    Alcohol Swabs ([facility] PREP) 70 % Pads 1 Package by Other route 3 times a week. 100 Each 0    ACCU-CHEK LAURA PLUS strip Check FBS and 2 hours post meal glucose 3-4 times a week. 100 Strip 1    Coenzyme Q10 (COQ10 PO) Take  by mouth.      Ascorbic Acid (VITAMIN C) 1000 MG Tab Take  by mouth.      Blood Glucose Monitoring Suppl (ACCU-CHEK LAURA PLUS) w/Device Kit       Multiple Vitamins-Minerals (MULTIVITAMIN ADULT PO) Take  by mouth.      Calcium Polycarbophil (FIBER-LAX PO) Take  by mouth.      Probiotic Product (PROBIOTIC DAILY PO) Take  by mouth.      IBUPROFEN PO Take  by mouth.       No current Epic-ordered facility-administered medications on file.     Codeine    PMH/PSH/FH/Social history reviewed.  Vaccinations discussed.  Previous records and labs reviewed. Discussed age appropriate anticipatory guidance.    ROS: see hpi  Gen: no fevers/chills  Pulm: no sob, no cough  CV: no chest pain, no palpitations, no edema  GI: no nausea/vomiting, no diarrhea  Skin: no rash    Objective:   Exam:  /76 (BP Location: Left arm, Patient Position: Sitting, BP Cuff Size: Adult)   Pulse 80   Temp 36.4 °C (97.6 °F) (Temporal)   Resp 16   Ht 1.753 m (5' 9\")   Wt 90.3 kg (199 lb)   SpO2 94%   BMI " 29.39 kg/m²    Body mass index is 29.39 kg/m².    Gen: Alert and oriented, No apparent distress.  HEENT: Head atraumatic, normocephalic. Pupils equal and round.  Neck: Neck is supple without lymphadenopathy.   Lungs: Normal effort, CTA bilaterally, no wheezes, rhonchi, or rales  CV: Regular rate and rhythm. No murmurs, rubs, or gallops.  Ext: No clubbing, cyanosis, edema.    Assessment & Plan:     48 y.o. male with the following -     1. Type 2 diabetes mellitus with hyperglycemia, without long-term current use of insulin (HCC)  Chronic, improving.  Continue current regimen.  Discussed dietary changes.  Diabetes recommendations:  -Follow an 1800 calorie ADA diet and aim to get about half of your calories from carbohydrates. For an 1800 calorie diet, that would be around 150-200 grams of carbs a day. Exercise as tolerated; ideally 150 minutes of cardiovascular exercise a week, or 20 minutes a day.   -Monitor blood sugars at home and keep a log book. Check your glucose fasting every morning, before dinner, and 2 hours after dinner (or as otherwise recommended at your appointment). Call if glucose <70 or >300. Please send weekly readings of your blood glucose through [employer].  -Please check your feet frequently for any open wounds/ulcers, signs of infection, or changes in sensation/neuropathy.  -Ensure that you see your ophthalmologist for your annual eye exam to assess for diabetic retinopathy.  - POCT  A1C  - glimepiride (AMARYL) 2 MG Tab; Take 1 Tablet by mouth every morning.  Dispense: 90 Tablet; Refill: 0  - Empagliflozin-metFORMIN HCl 5-1000 MG Tab; Take 1 Tablet by mouth 2 times a day.  Dispense: 180 Tablet; Refill: 0  - Comp Metabolic Panel; Future    2. Mixed hyperlipidemia  Chronic, due for lipid profile.  Will adjust medications accordingly.  - fenofibrate (TRICOR) 145 MG Tab; Take 1 Tablet by mouth every day.  Dispense: 90 Tablet; Refill: 1  - rosuvastatin (CRESTOR) 20 MG Tab; Take 1 Tablet by mouth every  evening.  Dispense: 90 Tablet; Refill: 3  - Lipid Profile; Future    3. Vitamin D deficiency  - VITAMIN D,25 HYDROXY (DEFICIENCY); Future    4. Erectile dysfunction, unspecified erectile dysfunction type  Chronic, uncontrolled.  Rule out hypogonadism.  Urology evaluation if persists.  - TESTOSTERONE, FREE AND TOTAL; Future    5. Need for hepatitis C screening test  - HEP C VIRUS ANTIBODY; Future    6. Screening for deficiency anemia  - CBC WITH DIFFERENTIAL; Future    Return in about 1 month (around 6/14/2024) for Follow-up, 2nd hep B.    Jane Wells PA-C (Baker)  Physician Assistant Certified  [facility]    Please note that this dictation was created using voice recognition software. I have made every reasonable attempt to correct obvious errors, but I expect that there are errors of grammar and possibly content that I did not discover before finalizing the note.

## 2024-05-14 NOTE — PATIENT INSTRUCTIONS
It was a pleasure meeting with you today at [facility]!    Your medical history/records and medications were reviewed today.     UPDATE on MyChart Results: If you have blood work, and/or imaging studies, or any other test or procedure completed, you will have access to results as soon as they become available in MyChart. Recently, these results will be available for review at the same time that your provider is able to see results!    This will likely mean you will see a result before your provider has had a chance to review and discuss with you.  Some results or care notes may be hard to understand and may be serious in nature.    We look at every result and your provider will contact you to explain what they mean and discuss appropriate next steps. Please allow for at least 72 business hours for chart and result review.     We prefer that you wait for your care team to contact you with your results.  Often, your provider will discuss your results with you at your next appointment. We look forward to continuing to partner with you in your care.    Please review my practice information below:    If you have any prescription refill requests, please send them via [employer] or discuss with your provider at the start of your office visits. Please allow 3-5 business days for lab and testing review and you will be contacted via [employer] with those results, or if advised to make a follow up appointment regarding those results, then please do so.     Once resulted, your lab/test/imaging results will show up automatically in your MyChart. Please wait for my interpretation and recommendations prior to viewing your results to avoid any unnecessary confusion or misinterpretation. I will address all of the lab values that I interpret as abnormal and message you accordingly on your MyChart. I will always send you a message about your results even if they are normal. If you do not hear back from me within 5-7 business  days after completing your tests, then please send me a message on [employer] so I can obtain your results (especially if you went to an outside lab or imaging center - [facility], [facility], etc).     If you have any additional questions or concerns beyond my interpretation of your results, please make an appointment with me to discuss in further detail.    Please only use the [employer] messaging system for questions regarding your most recent appointment or if advised to use otherwise (glucose or blood pressure reporting).     If you have any new problems or concerns, you must make an appointment to discuss. This includes any referral requests, lab requests (unless advised to notify me for pre-appt labs), medication side effects, or request for medication adjustments.     Please arrive 15 minutes prior to your appointment time to complete your check-in and intake with the medical assistant.      Thank you,    Jane Wells PA-C (Baker)  Physician Assistant Certified  [facility]    -----------------------------------------------------------------    Attn: Patients of [facility]:    In an effort to continue to provide excellent and efficient care to our patients, it is vital that we continue to use our resources appropriately. With that, this is a reminder that [employer] is used for prescription refill requests, test results, virtual visits, and chart review only.     Any new questions, concerns/conditions, lab/imaging requests, medication adjustments, new prescriptions, or referral requests do require an appointment (virtually or in person), unless discussed otherwise at your most recent appointment.     Thank you for your understanding,    [facility]      [facility]  [address] [address]  Phone: [phone]

## 2024-05-14 NOTE — ASSESSMENT & PLAN NOTE
Patient states that Viagra has not been effective for his erectile dysfunction.  No nocturia or urinary frequency.  Has diabetes and hyperlipidemia.

## 2024-05-14 NOTE — ASSESSMENT & PLAN NOTE
Chronic, compliant with Synjardy and glimepiride.  Does not check blood sugar at home.  No vision changes, neuropathy.

## 2024-05-20 DIAGNOSIS — E11.65 TYPE 2 DIABETES MELLITUS WITH HYPERGLYCEMIA, WITHOUT LONG-TERM CURRENT USE OF INSULIN (HCC): ICD-10-CM

## 2024-05-20 RX ORDER — GLIMEPIRIDE 2 MG/1
2 TABLET ORAL EVERY MORNING
Qty: 90 TABLET | Refills: 0 | Status: SHIPPED | OUTPATIENT
Start: 2024-05-20

## 2024-05-20 RX ORDER — EMPAGLIFLOZIN AND METFORMIN HYDROCHLORIDE 5; 1000 MG/1; MG/1
1 TABLET ORAL 2 TIMES DAILY
Qty: 180 TABLET | Refills: 0 | Status: SHIPPED | OUTPATIENT
Start: 2024-05-20

## 2024-05-20 NOTE — TELEPHONE ENCOUNTER
Received request via: Pharmacy    Was the patient seen in the last year in this department? Yes    Does the patient have an active prescription (recently filled or refills available) for medication(s) requested? No    Pharmacy Name: [id number]    Does the patient have retirement Plus and need 100 day supply (blood pressure, diabetes and cholesterol meds only)? Patient does not have SCP

## 2024-06-15 ENCOUNTER — HOSPITAL ENCOUNTER (OUTPATIENT)
Dept: LAB | Facility: MEDICAL CENTER | Age: 49
End: 2024-06-15
Attending: PHYSICIAN ASSISTANT
Payer: COMMERCIAL

## 2024-06-15 DIAGNOSIS — E55.9 VITAMIN D DEFICIENCY: ICD-10-CM

## 2024-06-15 DIAGNOSIS — N52.9 ERECTILE DYSFUNCTION, UNSPECIFIED ERECTILE DYSFUNCTION TYPE: ICD-10-CM

## 2024-06-15 DIAGNOSIS — Z13.0 SCREENING FOR DEFICIENCY ANEMIA: ICD-10-CM

## 2024-06-15 DIAGNOSIS — E78.2 MIXED HYPERLIPIDEMIA: ICD-10-CM

## 2024-06-15 DIAGNOSIS — Z11.59 NEED FOR HEPATITIS C SCREENING TEST: ICD-10-CM

## 2024-06-15 DIAGNOSIS — E11.65 TYPE 2 DIABETES MELLITUS WITH HYPERGLYCEMIA, WITHOUT LONG-TERM CURRENT USE OF INSULIN (HCC): ICD-10-CM

## 2024-06-15 LAB
25(OH)D3 SERPL-MCNC: 62 NG/ML (ref 30–100)
ALBUMIN SERPL BCP-MCNC: 4.8 G/DL (ref 3.2–4.9)
ALBUMIN/GLOB SERPL: 1.7 G/DL
ALP SERPL-CCNC: 69 U/L (ref 30–99)
ALT SERPL-CCNC: 23 U/L (ref 2–50)
ANION GAP SERPL CALC-SCNC: 15 MMOL/L (ref 7–16)
AST SERPL-CCNC: 20 U/L (ref 12–45)
BASOPHILS # BLD AUTO: 0.8 % (ref 0–1.8)
BASOPHILS # BLD: 0.05 K/UL (ref 0–0.12)
BILIRUB SERPL-MCNC: 0.5 MG/DL (ref 0.1–1.5)
BUN SERPL-MCNC: 18 MG/DL (ref 8–22)
CALCIUM ALBUM COR SERPL-MCNC: 8.9 MG/DL (ref 8.5–10.5)
CALCIUM SERPL-MCNC: 9.5 MG/DL (ref 8.5–10.5)
CHLORIDE SERPL-SCNC: 110 MMOL/L (ref 96–112)
CHOLEST SERPL-MCNC: 126 MG/DL (ref 100–199)
CO2 SERPL-SCNC: 17 MMOL/L (ref 20–33)
CREAT SERPL-MCNC: 0.89 MG/DL (ref 0.5–1.4)
EOSINOPHIL # BLD AUTO: 0.09 K/UL (ref 0–0.51)
EOSINOPHIL NFR BLD: 1.5 % (ref 0–6.9)
ERYTHROCYTE [DISTWIDTH] IN BLOOD BY AUTOMATED COUNT: 43.5 FL (ref 35.9–50)
FASTING STATUS PATIENT QL REPORTED: NORMAL
GFR SERPLBLD CREATININE-BSD FMLA CKD-EPI: 104 ML/MIN/1.73 M 2
GLOBULIN SER CALC-MCNC: 2.9 G/DL (ref 1.9–3.5)
GLUCOSE SERPL-MCNC: 141 MG/DL (ref 65–99)
HCT VFR BLD AUTO: 55.1 % (ref 42–52)
HCV AB SER QL: NORMAL
HDLC SERPL-MCNC: 42 MG/DL
HGB BLD-MCNC: 17.7 G/DL (ref 14–18)
IMM GRANULOCYTES # BLD AUTO: 0.02 K/UL (ref 0–0.11)
IMM GRANULOCYTES NFR BLD AUTO: 0.3 % (ref 0–0.9)
LDLC SERPL CALC-MCNC: 58 MG/DL
LYMPHOCYTES # BLD AUTO: 2.16 K/UL (ref 1–4.8)
LYMPHOCYTES NFR BLD: 36.6 % (ref 22–41)
MCH RBC QN AUTO: 28.7 PG (ref 27–33)
MCHC RBC AUTO-ENTMCNC: 32.1 G/DL (ref 32.3–36.5)
MCV RBC AUTO: 89.3 FL (ref 81.4–97.8)
MONOCYTES # BLD AUTO: 0.43 K/UL (ref 0–0.85)
MONOCYTES NFR BLD AUTO: 7.3 % (ref 0–13.4)
NEUTROPHILS # BLD AUTO: 3.15 K/UL (ref 1.82–7.42)
NEUTROPHILS NFR BLD: 53.5 % (ref 44–72)
NRBC # BLD AUTO: 0 K/UL
NRBC BLD-RTO: 0 /100 WBC (ref 0–0.2)
PLATELET # BLD AUTO: 263 K/UL (ref 164–446)
PMV BLD AUTO: 10.1 FL (ref 9–12.9)
POTASSIUM SERPL-SCNC: 4.2 MMOL/L (ref 3.6–5.5)
PROT SERPL-MCNC: 7.7 G/DL (ref 6–8.2)
RBC # BLD AUTO: 6.17 M/UL (ref 4.7–6.1)
SODIUM SERPL-SCNC: 142 MMOL/L (ref 135–145)
TRIGL SERPL-MCNC: 132 MG/DL (ref 0–149)
WBC # BLD AUTO: 5.9 K/UL (ref 4.8–10.8)

## 2024-06-15 PROCEDURE — 82306 VITAMIN D 25 HYDROXY: CPT

## 2024-06-15 PROCEDURE — 86803 HEPATITIS C AB TEST: CPT

## 2024-06-15 PROCEDURE — 80061 LIPID PANEL: CPT

## 2024-06-15 PROCEDURE — 84402 ASSAY OF FREE TESTOSTERONE: CPT

## 2024-06-15 PROCEDURE — 84403 ASSAY OF TOTAL TESTOSTERONE: CPT

## 2024-06-15 PROCEDURE — 85025 COMPLETE CBC W/AUTO DIFF WBC: CPT

## 2024-06-15 PROCEDURE — 80053 COMPREHEN METABOLIC PANEL: CPT

## 2024-06-15 PROCEDURE — 36415 COLL VENOUS BLD VENIPUNCTURE: CPT

## 2024-06-15 PROCEDURE — 84270 ASSAY OF SEX HORMONE GLOBUL: CPT

## 2024-06-17 ENCOUNTER — OFFICE VISIT (OUTPATIENT)
Dept: MEDICAL GROUP | Facility: IMAGING CENTER | Age: 49
End: 2024-06-17
Payer: COMMERCIAL

## 2024-06-17 VITALS
BODY MASS INDEX: 29.18 KG/M2 | TEMPERATURE: 97.7 F | DIASTOLIC BLOOD PRESSURE: 72 MMHG | HEIGHT: 69 IN | WEIGHT: 197 LBS | RESPIRATION RATE: 16 BRPM | HEART RATE: 83 BPM | SYSTOLIC BLOOD PRESSURE: 122 MMHG | OXYGEN SATURATION: 95 %

## 2024-06-17 DIAGNOSIS — N52.9 ERECTILE DYSFUNCTION, UNSPECIFIED ERECTILE DYSFUNCTION TYPE: ICD-10-CM

## 2024-06-17 DIAGNOSIS — D75.1 ERYTHROCYTOSIS: ICD-10-CM

## 2024-06-17 DIAGNOSIS — E78.2 MIXED HYPERLIPIDEMIA: ICD-10-CM

## 2024-06-17 DIAGNOSIS — Z12.11 COLON CANCER SCREENING: ICD-10-CM

## 2024-06-17 DIAGNOSIS — E11.65 TYPE 2 DIABETES MELLITUS WITH HYPERGLYCEMIA, WITHOUT LONG-TERM CURRENT USE OF INSULIN (HCC): ICD-10-CM

## 2024-06-17 LAB
SHBG SERPL-SCNC: 25 NMOL/L (ref 19–76)
TESTOST FREE MFR SERPL: 2.1 % (ref 1.6–2.9)
TESTOST FREE SERPL-MCNC: 81 PG/ML (ref 47–244)
TESTOST SERPL-MCNC: 383 NG/DL (ref 300–890)

## 2024-06-17 PROCEDURE — 99214 OFFICE O/P EST MOD 30 MIN: CPT | Performed by: PHYSICIAN ASSISTANT

## 2024-06-17 PROCEDURE — 1126F AMNT PAIN NOTED NONE PRSNT: CPT | Performed by: PHYSICIAN ASSISTANT

## 2024-06-17 RX ORDER — SILDENAFIL 100 MG/1
100 TABLET, FILM COATED ORAL
Qty: 10 TABLET | Refills: 5 | Status: SHIPPED | OUTPATIENT
Start: 2024-06-17

## 2024-06-17 ASSESSMENT — PAIN SCALES - GENERAL: PAINLEVEL: NO PAIN

## 2024-06-17 ASSESSMENT — FIBROSIS 4 INDEX: FIB4 SCORE: 0.79

## 2024-06-17 NOTE — ASSESSMENT & PLAN NOTE
Chronic, on Viagra 100 mg daily as needed.  Needs refill today.  Testosterone levels pending.  No chest pain, palpitations, dizziness.

## 2024-06-17 NOTE — ASSESSMENT & PLAN NOTE
Chronic, does not check blood sugar at home.  Compliant with his medications.  Takes glimepiride 2 mg every morning and Synjardy.  On rosuvastatin and fenofibrate for mixed hyperlipidemia.  Up-to-date on eye exam.  Due for second hepatitis B vaccination, unavailable in office today.

## 2024-06-17 NOTE — PROGRESS NOTES
Subjective:     CC:   Chief Complaint   Patient presents with    Follow-Up     On lab results from 06/15/24       HPI:   Diogo presents today to discuss:    Mixed hyperlipidemia   Latest Reference Range & Units 24 09:42 06/15/24 08:54   Cholesterol,Tot 100 - 199 mg/dL 259 (H) 126   Triglycerides 0 - 149 mg/dL 867 (H) 132   HDL >=40 mg/dL 32 ! 42   LDL <100 mg/dL see below 58   (H): Data is abnormally high  !: Data is abnormal    Type 2 diabetes mellitus with hyperglycemia, without long-term current use of insulin (HCC)  Chronic, does not check blood sugar at home.  Compliant with his medications.  Takes glimepiride 2 mg every morning and Synjardy.  On rosuvastatin and fenofibrate for mixed hyperlipidemia.  Up-to-date on eye exam.  Due for second hepatitis B vaccination, unavailable in office today.    Erectile dysfunction  Chronic, on Viagra 100 mg daily as needed.  Needs refill today.  Testosterone levels pending.  No chest pain, palpitations, dizziness.      Past Medical History:   Diagnosis Date    Low serum vitamin B12 2022     Family History   Problem Relation Age of Onset    Diabetes Mother     Stroke Father     Stroke Paternal Grandfather      Past Surgical History:   Procedure Laterality Date    APPENDECTOMY       Social History     Tobacco Use    Smoking status: Former     Current packs/day: 0.00     Types: Cigarettes     Quit date: 2017     Years since quittin.5    Smokeless tobacco: Former    Tobacco comments:     States that he quit in 2017, previous he would smoke when he was stressed.   Vaping Use    Vaping status: Never Used   Substance Use Topics    Alcohol use: Yes     Alcohol/week: 1.8 oz     Types: 3 Standard drinks or equivalent per week     Comment: occasionally     Drug use: Yes     Types: Marijuana, Inhaled     Comment: occasional     Social History     Social History Narrative    Born and raised in [place]    Moved from [facility]         Current  Outpatient Medications Ordered in Epic   Medication Sig Dispense Refill    sildenafil citrate (VIAGRA) 100 MG tablet Take 1 Tablet by mouth 1 time a day as needed for Erectile Dysfunction. 10 Tablet 5    SYNJARDY 5-1000 MG Tab TAKE 1 TABLET BY MOUTH TWICE A  Tablet 0    glimepiride (AMARYL) 2 MG Tab TAKE 1 TABLET BY MOUTH EVERY DAY IN THE MORNING 90 Tablet 0    fenofibrate (TRICOR) 145 MG Tab Take 1 Tablet by mouth every day. 90 Tablet 1    rosuvastatin (CRESTOR) 20 MG Tab Take 1 Tablet by mouth every evening. 90 Tablet 3    vitamin D2, Ergocalciferol, (DRISDOL) 1.25 MG (43223 UT) Cap capsule TAKE 1 CAPSULE BY MOUTH ONE TIME PER WEEK 8 Capsule 0    diclofenac CR (VOLTAREN-XR) 100 MG TABLET SR 24 HR tablet       aspirin 81 MG EC tablet Take 81 mg by mouth every day.      Coenzyme Q10 (COQ10 PO) Take  by mouth.      Ascorbic Acid (VITAMIN C) 1000 MG Tab Take  by mouth.      Multiple Vitamins-Minerals (MULTIVITAMIN ADULT PO) Take  by mouth.      Calcium Polycarbophil (FIBER-LAX PO) Take  by mouth.      Probiotic Product (PROBIOTIC DAILY PO) Take  by mouth.      IBUPROFEN PO Take  by mouth.      Continuous Blood Gluc Sensor (FREESTYLE KARLENE 3 SENSOR) [facility] Use as directed (Patient not taking: Reported on 6/17/2024) 2 Each 5    Lancets Lancets order: Lancets for Accucheck Laura meter. Sig: use in am for FBS and prn ssx high or low sugar. #100 RF x 3 (Patient not taking: Reported on 6/17/2024) 100 Each 3    Alcohol Swabs (CVS PREP) 70 % Pads 1 Package by Other route 3 times a week. (Patient not taking: Reported on 6/17/2024) 100 Each 0    ACCU-CHEK LAURA PLUS strip Check FBS and 2 hours post meal glucose 3-4 times a week. (Patient not taking: Reported on 6/17/2024) 100 Strip 1    Blood Glucose Monitoring Suppl (ACCU-CHEK LAURA PLUS) w/Device Kit  (Patient not taking: Reported on 6/17/2024)       No current [facility]-ordered facility-administered medications on file.     Codeine    PMH/PSH/FH/Social history reviewed.   "Vaccinations discussed.  Previous records and labs reviewed. Discussed age appropriate anticipatory guidance.    ROS: see hpi  Gen: no fevers/chills  Pulm: no sob, no cough  CV: no chest pain, no palpitations, no edema  GI: no nausea/vomiting, no diarrhea  Skin: no rash    Objective:   Exam:  /72 (BP Location: Right arm, Patient Position: Sitting, BP Cuff Size: Adult)   Pulse 83   Temp 36.5 °C (97.7 °F) (Temporal)   Resp 16   Ht 1.753 m (5' 9\")   Wt 89.4 kg (197 lb)   SpO2 95%   BMI 29.09 kg/m²    Body mass index is 29.09 kg/m².    Gen: Alert and oriented, No apparent distress.  HEENT: Head atraumatic, normocephalic. Pupils equal and round.  Neck: Neck is supple without lymphadenopathy.   Lungs: Normal effort, CTA bilaterally, no wheezes, rhonchi, or rales  CV: Regular rate and rhythm. No murmurs, rubs, or gallops.  ABD: +BS. Non-tender, non-distended. No rebound, rigidity, or guarding.  Ext: No clubbing, cyanosis, edema.    Assessment & Plan:     48 y.o. male with the following -     1. Type 2 diabetes mellitus with hyperglycemia, without long-term current use of insulin (HCC)  Chronic, improving.  Continue Synjardy and glimepiride.  Has refused GLP-1 analogs in the past.  Continue rosuvastatin.  - Basic Metabolic Panel; Future  - HEMOGLOBIN A1C; Future    2. Mixed hyperlipidemia  Chronic, controlled and stable. Continue current regimen -rosuvastatin 20 mg nightly, fenofibrate 145 mg daily.    3. Erectile dysfunction, unspecified erectile dysfunction type  Chronic, controlled and stable. Continue current regimen -   - sildenafil citrate (VIAGRA) 100 MG tablet; Take 1 Tablet by mouth 1 time a day as needed for Erectile Dysfunction.  Dispense: 10 Tablet; Refill: 5    4. Erythrocytosis  Recheck, also with low bicarb.  Patient drink alcohol the night before his lab draw.  Recheck BMP and CBC.  - CBC WITH DIFFERENTIAL; Future    5. Colon cancer screening  - ASHLEE (FIT DNA)    Return in about 3 months " (around 9/17/2024) for Follow-up labs/tests, 2nd Hep B.    Jane Wells PA-C (Baker)  Physician Assistant Certified  [facility]      Please note that this dictation was created using voice recognition software. I have made every reasonable attempt to correct obvious errors, but I expect that there are errors of grammar and possibly content that I did not discover before finalizing the note.

## 2024-06-17 NOTE — PATIENT INSTRUCTIONS
It was a pleasure meeting with you today at [facility]!    Your medical history/records and medications were reviewed today.     UPDATE on MyChart Results: If you have blood work, and/or imaging studies, or any other test or procedure completed, you will have access to results as soon as they become available in MyChart. Recently, these results will be available for review at the same time that your provider is able to see results!    This will likely mean you will see a result before your provider has had a chance to review and discuss with you.  Some results or care notes may be hard to understand and may be serious in nature.    We look at every result and your provider will contact you to explain what they mean and discuss appropriate next steps. Please allow for at least 72 business hours for chart and result review.     We prefer that you wait for your care team to contact you with your results.  Often, your provider will discuss your results with you at your next appointment. We look forward to continuing to partner with you in your care.    Please review my practice information below:    If you have any prescription refill requests, please send them via [employer] or discuss with your provider at the start of your office visits. Please allow 3-5 business days for lab and testing review and you will be contacted via [employer] with those results, or if advised to make a follow up appointment regarding those results, then please do so.     Once resulted, your lab/test/imaging results will show up automatically in your MyChart. Please wait for my interpretation and recommendations prior to viewing your results to avoid any unnecessary confusion or misinterpretation. I will address all of the lab values that I interpret as abnormal and message you accordingly on your MyChart. I will always send you a message about your results even if they are normal. If you do not hear back from me within 5-7 business  days after completing your tests, then please send me a message on [employer] so I can obtain your results (especially if you went to an outside lab or imaging center - [facility], [facility], etc).     If you have any additional questions or concerns beyond my interpretation of your results, please make an appointment with me to discuss in further detail.    Please only use the [employer] messaging system for questions regarding your most recent appointment or if advised to use otherwise (glucose or blood pressure reporting).     If you have any new problems or concerns, you must make an appointment to discuss. This includes any referral requests, lab requests (unless advised to notify me for pre-appt labs), medication side effects, or request for medication adjustments.     Please arrive 15 minutes prior to your appointment time to complete your check-in and intake with the medical assistant.      Thank you,    Jane Wells PA-C (Baker)  Physician Assistant Certified  [facility]    -----------------------------------------------------------------    Attn: Patients of [facility]:    In an effort to continue to provide excellent and efficient care to our patients, it is vital that we continue to use our resources appropriately. With that, this is a reminder that [employer] is used for prescription refill requests, test results, virtual visits, and chart review only.     Any new questions, concerns/conditions, lab/imaging requests, medication adjustments, new prescriptions, or referral requests do require an appointment (virtually or in person), unless discussed otherwise at your most recent appointment.     Thank you for your understanding,    [facility]

## 2024-06-17 NOTE — ASSESSMENT & PLAN NOTE
Latest Reference Range & Units 01/27/24 09:42 06/15/24 08:54   Cholesterol,Tot 100 - 199 mg/dL 259 (H) 126   Triglycerides 0 - 149 mg/dL 867 (H) 132   HDL >=40 mg/dL 32 ! 42   LDL <100 mg/dL see below 58   (H): Data is abnormally high  !: Data is abnormal

## 2024-07-30 DIAGNOSIS — E11.65 TYPE 2 DIABETES MELLITUS WITH HYPERGLYCEMIA, WITHOUT LONG-TERM CURRENT USE OF INSULIN (HCC): ICD-10-CM

## 2024-08-01 RX ORDER — GLIMEPIRIDE 2 MG/1
2 TABLET ORAL EVERY MORNING
Qty: 90 TABLET | Refills: 1 | Status: SHIPPED | OUTPATIENT
Start: 2024-08-01

## 2024-10-25 DIAGNOSIS — E11.65 TYPE 2 DIABETES MELLITUS WITH HYPERGLYCEMIA, WITHOUT LONG-TERM CURRENT USE OF INSULIN (HCC): ICD-10-CM

## 2024-10-25 DIAGNOSIS — E78.2 MIXED HYPERLIPIDEMIA: ICD-10-CM

## 2024-10-28 RX ORDER — FENOFIBRATE 145 MG/1
145 TABLET, COATED ORAL DAILY
Qty: 90 TABLET | Refills: 1 | Status: SHIPPED | OUTPATIENT
Start: 2024-10-28

## 2024-10-28 RX ORDER — EMPAGLIFLOZIN AND METFORMIN HYDROCHLORIDE 5; 1000 MG/1; MG/1
1 TABLET ORAL 2 TIMES DAILY
Qty: 180 TABLET | Refills: 0 | Status: SHIPPED | OUTPATIENT
Start: 2024-10-28

## 2024-12-16 DIAGNOSIS — R19.5 POSITIVE COLORECTAL CANCER SCREENING USING COLOGUARD TEST: ICD-10-CM

## 2025-01-13 DIAGNOSIS — E11.65 TYPE 2 DIABETES MELLITUS WITH HYPERGLYCEMIA, WITHOUT LONG-TERM CURRENT USE OF INSULIN (HCC): ICD-10-CM

## 2025-01-13 RX ORDER — EMPAGLIFLOZIN AND METFORMIN HYDROCHLORIDE 5; 1000 MG/1; MG/1
1 TABLET ORAL 2 TIMES DAILY
Qty: 180 TABLET | Refills: 0 | Status: SHIPPED | OUTPATIENT
Start: 2025-01-13

## 2025-01-14 NOTE — TELEPHONE ENCOUNTER
Received request via: Pharmacy    Was the patient seen in the last year in this department? Yes    Does the patient have an active prescription (recently filled or refills available) for medication(s) requested? No    Pharmacy Name: [facility] #[id number]    Does the patient have assisted Plus and need 100-day supply? (This applies to ALL medications) Patient does not have SCP

## 2025-03-06 ENCOUNTER — OFFICE VISIT (OUTPATIENT)
Dept: MEDICAL GROUP | Facility: IMAGING CENTER | Age: 50
End: 2025-03-06
Payer: COMMERCIAL

## 2025-03-06 ENCOUNTER — HOSPITAL ENCOUNTER (OUTPATIENT)
Facility: MEDICAL CENTER | Age: 50
End: 2025-03-06
Attending: PHYSICIAN ASSISTANT
Payer: COMMERCIAL

## 2025-03-06 VITALS
SYSTOLIC BLOOD PRESSURE: 116 MMHG | HEIGHT: 69 IN | TEMPERATURE: 97.8 F | DIASTOLIC BLOOD PRESSURE: 74 MMHG | WEIGHT: 196.6 LBS | HEART RATE: 66 BPM | BODY MASS INDEX: 29.12 KG/M2 | OXYGEN SATURATION: 98 % | RESPIRATION RATE: 16 BRPM

## 2025-03-06 DIAGNOSIS — E11.65 TYPE 2 DIABETES MELLITUS WITH HYPERGLYCEMIA, WITHOUT LONG-TERM CURRENT USE OF INSULIN (HCC): ICD-10-CM

## 2025-03-06 DIAGNOSIS — Z23 NEED FOR VACCINATION: ICD-10-CM

## 2025-03-06 DIAGNOSIS — E55.9 VITAMIN D DEFICIENCY: ICD-10-CM

## 2025-03-06 DIAGNOSIS — E78.2 MIXED HYPERLIPIDEMIA: ICD-10-CM

## 2025-03-06 DIAGNOSIS — R19.5 POSITIVE COLORECTAL CANCER SCREENING USING COLOGUARD TEST: ICD-10-CM

## 2025-03-06 DIAGNOSIS — Z13.0 SCREENING FOR DEFICIENCY ANEMIA: ICD-10-CM

## 2025-03-06 DIAGNOSIS — Z13.220 SCREENING CHOLESTEROL LEVEL: ICD-10-CM

## 2025-03-06 DIAGNOSIS — Z12.83 SKIN CANCER SCREENING: ICD-10-CM

## 2025-03-06 DIAGNOSIS — Z12.5 PROSTATE CANCER SCREENING: ICD-10-CM

## 2025-03-06 DIAGNOSIS — N52.9 ERECTILE DYSFUNCTION, UNSPECIFIED ERECTILE DYSFUNCTION TYPE: ICD-10-CM

## 2025-03-06 DIAGNOSIS — Z00.00 WELLNESS EXAMINATION: ICD-10-CM

## 2025-03-06 LAB
CREAT UR-MCNC: 105 MG/DL
HBA1C MFR BLD: 7 % (ref ?–5.8)
MICROALBUMIN UR-MCNC: <1.2 MG/DL
MICROALBUMIN/CREAT UR: NORMAL MG/G (ref 0–30)
POCT INT CON NEG: NEGATIVE
POCT INT CON POS: POSITIVE

## 2025-03-06 PROCEDURE — 82043 UR ALBUMIN QUANTITATIVE: CPT

## 2025-03-06 PROCEDURE — 90471 IMMUNIZATION ADMIN: CPT | Performed by: PHYSICIAN ASSISTANT

## 2025-03-06 PROCEDURE — 83036 HEMOGLOBIN GLYCOSYLATED A1C: CPT | Performed by: PHYSICIAN ASSISTANT

## 2025-03-06 PROCEDURE — 90746 HEPB VACCINE 3 DOSE ADULT IM: CPT | Mod: JZ | Performed by: PHYSICIAN ASSISTANT

## 2025-03-06 PROCEDURE — 1126F AMNT PAIN NOTED NONE PRSNT: CPT | Performed by: PHYSICIAN ASSISTANT

## 2025-03-06 PROCEDURE — 3074F SYST BP LT 130 MM HG: CPT | Performed by: PHYSICIAN ASSISTANT

## 2025-03-06 PROCEDURE — 82570 ASSAY OF URINE CREATININE: CPT

## 2025-03-06 PROCEDURE — 99396 PREV VISIT EST AGE 40-64: CPT | Mod: 25 | Performed by: PHYSICIAN ASSISTANT

## 2025-03-06 PROCEDURE — 3078F DIAST BP <80 MM HG: CPT | Performed by: PHYSICIAN ASSISTANT

## 2025-03-06 RX ORDER — EMPAGLIFLOZIN AND METFORMIN HYDROCHLORIDE 5; 1000 MG/1; MG/1
1 TABLET ORAL 2 TIMES DAILY
Qty: 180 TABLET | Refills: 1 | Status: SHIPPED | OUTPATIENT
Start: 2025-03-06

## 2025-03-06 RX ORDER — FENOFIBRATE 145 MG/1
145 TABLET, COATED ORAL DAILY
Qty: 90 TABLET | Refills: 1 | Status: SHIPPED | OUTPATIENT
Start: 2025-03-06

## 2025-03-06 RX ORDER — GLIMEPIRIDE 2 MG/1
2 TABLET ORAL EVERY MORNING
Qty: 90 TABLET | Refills: 1 | Status: CANCELLED | OUTPATIENT
Start: 2025-03-06

## 2025-03-06 RX ORDER — SILDENAFIL 100 MG/1
100 TABLET, FILM COATED ORAL
Qty: 10 TABLET | Refills: 9 | Status: SHIPPED | OUTPATIENT
Start: 2025-03-06

## 2025-03-06 RX ORDER — ROSUVASTATIN CALCIUM 20 MG/1
20 TABLET, COATED ORAL EVERY EVENING
Qty: 90 TABLET | Refills: 3 | Status: SHIPPED | OUTPATIENT
Start: 2025-03-06

## 2025-03-06 RX ORDER — GLIMEPIRIDE 1 MG/1
1 TABLET ORAL 2 TIMES DAILY
Qty: 180 TABLET | Refills: 3 | Status: SHIPPED | OUTPATIENT
Start: 2025-03-06

## 2025-03-06 ASSESSMENT — PATIENT HEALTH QUESTIONNAIRE - PHQ9: CLINICAL INTERPRETATION OF PHQ2 SCORE: 0

## 2025-03-06 ASSESSMENT — PAIN SCALES - GENERAL: PAINLEVEL_OUTOF10: NO PAIN

## 2025-03-06 ASSESSMENT — FIBROSIS 4 INDEX: FIB4 SCORE: 0.81

## 2025-03-06 NOTE — PATIENT INSTRUCTIONS
It was a pleasure meeting with you today at [facility]!    Your medical history/records and medications were reviewed today.     UPDATE on MyChart Results: If you have blood work, and/or imaging studies, or any other test or procedure completed, you will have access to results as soon as they become available in MyChart. Recently, these results will be available for review at the same time that your provider is able to see results!    This will likely mean you will see a result before your provider has had a chance to review and discuss with you.  Some results or care notes may be hard to understand and may be serious in nature.    We look at every result and your provider will contact you to explain what they mean and discuss appropriate next steps. Please allow for at least 72 business hours for chart and result review.     We prefer that you wait for your care team to contact you with your results.  Often, your provider will discuss your results with you at your next appointment. We look forward to continuing to partner with you in your care.    Please review my practice information below:    If you have any prescription refill requests, please send them via [employer] or discuss with your provider at the start of your office visits. Please allow 3-5 business days for lab and testing review and you will be contacted via [employer] with those results, or if advised to make a follow up appointment regarding those results, then please do so.     Once resulted, your lab/test/imaging results will show up automatically in your MyChart. Please wait for my interpretation and recommendations prior to viewing your results to avoid any unnecessary confusion or misinterpretation. I will address all of the lab values that I interpret as abnormal and message you accordingly on your MyChart. I will always send you a message about your results even if they are normal. If you do not hear back from me within 5-7 business  days after completing your tests, then please send me a message on [employer] so I can obtain your results (especially if you went to an outside lab or imaging center - [facility], [facility], etc).     If you have any additional questions or concerns beyond my interpretation of your results, please make an appointment with me to discuss in further detail.    Please only use the [employer] messaging system for questions regarding your most recent appointment or if advised to use otherwise (glucose or blood pressure reporting).     If you have any new problems or concerns, you must make an appointment to discuss. This includes any referral requests, lab requests (unless advised to notify me for pre-appt labs), medication side effects, or request for medication adjustments.     Please arrive 15 minutes prior to your appointment time to complete your check-in and intake with the medical assistant.      Thank you,    Jane Wells PA-C (Baker)  Physician Assistant Certified  [facility]    -----------------------------------------------------------------    Attn: Patients of [facility]:    In an effort to continue to provide excellent and efficient care to our patients, it is vital that we continue to use our resources appropriately. With that, this is a reminder that [employer] is used for prescription refill requests, test results, virtual visits, and chart review only.     Any new questions, concerns/conditions, lab/imaging requests, medication adjustments, new prescriptions, or referral requests do require an appointment (virtually or in person), unless discussed otherwise at your most recent appointment.     Thank you for your understanding,    [facility]

## 2025-03-06 NOTE — ASSESSMENT & PLAN NOTE
Uses sildenafil as needed.  In a new long-distance relationship.  Might be moving out of state this summer.

## 2025-03-06 NOTE — PROGRESS NOTES
Subjective:     CC:    Chief Complaint   Patient presents with    Annual Exam         HISTORY OF THE PRESENT ILLNESS: Patient is a 49 y.o. male here to discuss:    Type 2 diabetes mellitus with hyperglycemia, without long-term current use of insulin (HCC)  Chronic, patient overall tolerating his medications, but states that he has been getting increased sugar cravings at night.  He is currently on glyburide 2 mg every morning, Synjardy 5-1000 mg twice a day.  Compliant with statin.  No microalbuminuria.  Due for eye exam.    Mixed hyperlipidemia  Chronic, last lipid panel improved on rosuvastatin 20 mg nightly and 145 mg daily.    Erectile dysfunction  Uses sildenafil as needed.  In a new long-distance relationship.  Might be moving out of state this summer.    Health Maintenance/Anticipatory Guidance:  Diet:   - 2-3 meals per day   - 1-2 servings of fruits/veggies per day   - 24 ounces of water   - 3-4 cups of caffeine, coffee and hot chocolate   - 2-3 days per week eating out   - Sugar cravings at night  Exercise: not routinely, but does snowboard frequently  Substance Abuse: no history  Safe in Relationships: yes  Sun Protection/Sunscreen: sometimes  Dermatologist: due for skin check  Dentist: overdue for twice yearly appointments  Eye Doctor: due for retinal screen    Cancer Screening:  Colorectal Cancer: positive cologuard 12/7/24  Prostate Cancer:    Latest Reference Range & Units 01/27/24 09:42   Prostatic Specific Antigen Tot 0.00 - 4.00 ng/mL 0.75     Infectious Disease Screening:  STI/HIV screening: declined    Immunizations: hep b today    Depression Screening    Little interest or pleasure in doing things?  0 - not at all   Feeling down, depressed , or hopeless? 0 - not at all     The ASCVD Risk score (Arnol LEI, et al., 2019) failed to calculate for the following reasons:    The valid total cholesterol range is 130 to 320 mg/dL    Allergies:   Codeine  Current Outpatient Medications Ordered in Epic    Medication Sig Dispense Refill    Empagliflozin-metFORMIN HCl (SYNJARDY) 5-1000 MG Tab Take 1 Tablet by mouth 2 times a day. 180 Tablet 1    fenofibrate (TRICOR) 145 MG Tab Take 1 Tablet by mouth every day. 90 Tablet 1    rosuvastatin (CRESTOR) 20 MG Tab Take 1 Tablet by mouth every evening. 90 Tablet 3    sildenafil citrate (VIAGRA) 100 MG tablet Take 1 Tablet by mouth 1 time a day as needed for Erectile Dysfunction. 10 Tablet 9    glimepiride (AMARYL) 1 MG tablet Take 1 Tablet by mouth 2 times a day. 180 Tablet 3    vitamin D2, Ergocalciferol, (DRISDOL) 1.25 MG (15813 UT) Cap capsule TAKE 1 CAPSULE BY MOUTH ONE TIME PER WEEK 8 Capsule 0    diclofenac CR (VOLTAREN-XR) 100 MG TABLET SR 24 HR tablet       aspirin 81 MG EC tablet Take 81 mg by mouth every day.      Coenzyme Q10 (COQ10 PO) Take  by mouth.      Ascorbic Acid (VITAMIN C) 1000 MG Tab Take  by mouth.      Multiple Vitamins-Minerals (MULTIVITAMIN ADULT PO) Take  by mouth.      Calcium Polycarbophil (FIBER-LAX PO) Take  by mouth.      Probiotic Product (PROBIOTIC DAILY PO) Take  by mouth.      IBUPROFEN PO Take  by mouth.       No current Epic-ordered facility-administered medications on file.     Past Medical History:   Diagnosis Date    Low serum vitamin B12 2022     Past Surgical History:   Procedure Laterality Date    APPENDECTOMY       Social History     Tobacco Use    Smoking status: Former     Current packs/day: 0.00     Types: Cigarettes     Quit date: 2017     Years since quittin.2    Smokeless tobacco: Former    Tobacco comments:     States that he quit in 2017, previous he would smoke when he was stressed.   Vaping Use    Vaping status: Never Used   Substance Use Topics    Alcohol use: Yes     Alcohol/week: 1.8 oz     Types: 3 Standard drinks or equivalent per week     Comment: occasionally     Drug use: Yes     Types: Marijuana, Inhaled     Comment: occasional     Social History     Social History Narrative    Born and raised in  [place]    Moved from [place]    Teacher - special ed         Family History   Problem Relation Age of Onset    Diabetes Mother     Stroke Father     Stroke Paternal Grandfather         ROS: see hpi  Gen: no fevers/chills  Pulm: no sob, no cough  CV: no chest pain, no palpitations  GI: no nausea/vomiting, no diarrhea  Skin: no rash      Objective:     Exam: /74 (BP Location: Right arm, Patient Position: Sitting, BP Cuff Size: Adult)   Pulse 66   Temp 36.6 °C (97.8 °F) (Temporal)   Resp 16   Ht 1.753 m (5' 9\")   Wt 89.2 kg (196 lb 9.6 oz)   SpO2 98%  Body mass index is 29.03 kg/m².    Gen: Alert and oriented, No apparent distress.  HEENT: Head atraumatic, normocephalic. PERRLA. EOMI. B/L TMs pearly gray without erythema or bulge.  Posterior pharynx non-erythematous.  Neck: Neck is supple without lymphadenopathy.  Full range of motion.  Lungs: Normal effort, CTA bilaterally, no wheezes, rhonchi, or rales  CV: Regular rate and rhythm. No murmurs, rubs, or gallops.  ABD: +BS. Non-tender, non-distended. No rebound, rigidity, or guarding.  Ext: No clubbing, cyanosis, edema.  2+ radial and dorsalis pedis pulses.  Neuro: CN II-XII intact, coordination intact bilaterally, no foot drop.  Skin: No rash or ulcerations.    Monofilament testing with a 10 gram force: sensation intact: intact bilaterally  Visual Inspection: Feet without maceration, ulcers, fissures.  Pedal pulses: intact bilaterally    Assessment & Plan:   49 y.o. male with the following -    1. Wellness examination  PMH/PSH/FH/Social history reviewed.  Vaccinations discussed.  Previous records and labs reviewed. Discussed age appropriate anticipatory guidance.    2. Type 2 diabetes mellitus with hyperglycemia, without long-term current use of insulin (HCC)  Chronic, controlled and stable. Continue current regimen, but switch glimepiride to 1 mg twice a day.  - Microalbumin Creat Ratio Urine (Clinic Collect); Future  - Diabetic Monofilament LE " Exam  - POCT Retinal Eye Exam  - POCT  A1C  - Comp Metabolic Panel; Future  - Lipid Profile; Future  - HEMOGLOBIN A1C; Future  - Empagliflozin-metFORMIN HCl (SYNJARDY) 5-1000 MG Tab; Take 1 Tablet by mouth 2 times a day.  Dispense: 180 Tablet; Refill: 1  - glimepiride (AMARYL) 1 MG tablet; Take 1 Tablet by mouth 2 times a day.  Dispense: 180 Tablet; Refill: 3    3. Mixed hyperlipidemia  Chronic, controlled and stable. Continue current regimen -   - Lipid Profile; Future  - fenofibrate (TRICOR) 145 MG Tab; Take 1 Tablet by mouth every day.  Dispense: 90 Tablet; Refill: 1  - rosuvastatin (CRESTOR) 20 MG Tab; Take 1 Tablet by mouth every evening.  Dispense: 90 Tablet; Refill: 3    4. Erectile dysfunction, unspecified erectile dysfunction type  - sildenafil citrate (VIAGRA) 100 MG tablet; Take 1 Tablet by mouth 1 time a day as needed for Erectile Dysfunction.  Dispense: 10 Tablet; Refill: 9    5. Positive colorectal cancer screening using Cologuard test  Patient has not scheduled an appointment yet to meet with GI, will resubmit order for colonoscopy.  - Referral to GI for Colonoscopy    6. Vitamin D deficiency  - VITAMIN D,25 HYDROXY (DEFICIENCY); Future    7. Prostate cancer screening  - PROSTATE SPECIFIC AG SCREENING; Future    8. Screening for deficiency anemia  - CBC WITH DIFFERENTIAL; Future    9. Screening cholesterol level  - Lipid Profile; Future    10. Skin cancer screening  - Referral to Dermatology    11. Need for vaccination  - Hepatitis B Vaccine Adult 20+    Return for Will notify patient to follow-up pending tests.    Jane Wells PA-C (Baker)  Physician Assistant Certified  [facility]    Please note that this dictation was created using voice recognition software. I have made every reasonable attempt to correct obvious errors, but I expect that there are errors of grammar and possibly content that I did not discover before finalizing the note.

## 2025-03-06 NOTE — ASSESSMENT & PLAN NOTE
Chronic, patient overall tolerating his medications, but states that he has been getting increased sugar cravings at night.  He is currently on glyburide 2 mg every morning, Synjardy 5-1000 mg twice a day.  Compliant with statin.  No microalbuminuria.  Due for eye exam.

## 2025-03-10 NOTE — Clinical Note
REFERRAL APPROVAL NOTICE         Sent on March 10, 2025                   Diogo Keen  [address]  [place] [address]                   Dear Mr. Keen,    After a careful review of the medical information and benefit coverage, [facility] has processed your referral. See below for additional details.    If applicable, you must be actively enrolled with your insurance for coverage of the authorized service. If you have any questions regarding your coverage, please contact your insurance directly.    REFERRAL INFORMATION   Referral #:  [id number]  Referred-To Department    Referred-By Provider:  Dermatology    Jane Wells P.A.-C.   [facility]      [address] [place] [address]  [phone] [address]  [place] [id number] [phone]    Referral Start Date:  03/06/2025  Referral End Date:   03/06/2026             SCHEDULING  If you do not already have an appointment, please call [phone] to make an appointment.     MORE INFORMATION  If you do not already have a "[employer]" account, sign up at: [email or web address]  You can access your medical information, make appointments, see lab results, billing information, and more.  If you have questions regarding this referral, please contact  the [facility] Referrals department at:             [phone]. Monday - Friday 8:00AM - 5:00PM.     Sincerely,    [facility]

## 2025-03-10 NOTE — Clinical Note
REFERRAL APPROVAL NOTICE         Sent on March 10, 2025                   Diogo Keen  [address] [address]                   Dear Mr. Keen,    After a careful review of the medical information and benefit coverage, [facility] has processed your referral. See below for additional details.    If applicable, you must be actively enrolled with your insurance for coverage of the authorized service. If you have any questions regarding your coverage, please contact your insurance directly.    REFERRAL INFORMATION   Referral #:  [id number]  Referred-To Provider    Referred-By Provider:  Gastroenterology    Jane Wells P.A.-C.   [facility]      [address] [place] [id number] [phone] [address]   [facility] [address]  [phone]    Referral Start Date:  03/06/2025  Referral End Date:   03/06/2026             SCHEDULING  If you do not already have an appointment, please call [phone] to make an appointment.     MORE INFORMATION  If you do not already have a [employer] account, sign up at: [email or web address]  You can access your medical information, make appointments, see lab results, billing information, and more.  If you have questions regarding this referral, please contact  the [facility] Referrals department at:             [phone]. Monday - Friday 8:00AM - 5:00PM.     Sincerely,    [facility]

## 2025-03-13 ENCOUNTER — OFFICE VISIT (OUTPATIENT)
Dept: MEDICAL GROUP | Facility: IMAGING CENTER | Age: 50
End: 2025-03-13
Payer: COMMERCIAL

## 2025-03-13 VITALS
BODY MASS INDEX: 29.03 KG/M2 | DIASTOLIC BLOOD PRESSURE: 80 MMHG | RESPIRATION RATE: 16 BRPM | SYSTOLIC BLOOD PRESSURE: 130 MMHG | TEMPERATURE: 97.8 F | OXYGEN SATURATION: 98 % | HEART RATE: 90 BPM | WEIGHT: 196 LBS | HEIGHT: 69 IN

## 2025-03-13 DIAGNOSIS — Z02.89 ENCOUNTER FOR COMPLETION OF FORM WITH PATIENT: ICD-10-CM

## 2025-03-13 DIAGNOSIS — F41.9 ANXIETY: ICD-10-CM

## 2025-03-13 DIAGNOSIS — F32.1 CURRENT MODERATE EPISODE OF MAJOR DEPRESSIVE DISORDER WITHOUT PRIOR EPISODE (HCC): ICD-10-CM

## 2025-03-13 PROCEDURE — 1126F AMNT PAIN NOTED NONE PRSNT: CPT | Performed by: PHYSICIAN ASSISTANT

## 2025-03-13 PROCEDURE — 3079F DIAST BP 80-89 MM HG: CPT | Performed by: PHYSICIAN ASSISTANT

## 2025-03-13 PROCEDURE — 99214 OFFICE O/P EST MOD 30 MIN: CPT | Performed by: PHYSICIAN ASSISTANT

## 2025-03-13 PROCEDURE — 3075F SYST BP GE 130 - 139MM HG: CPT | Performed by: PHYSICIAN ASSISTANT

## 2025-03-13 RX ORDER — PROPRANOLOL HYDROCHLORIDE 10 MG/1
10 TABLET ORAL 3 TIMES DAILY PRN
Qty: 30 TABLET | Refills: 0 | Status: SHIPPED | OUTPATIENT
Start: 2025-03-13

## 2025-03-13 ASSESSMENT — PATIENT HEALTH QUESTIONNAIRE - PHQ9
5. POOR APPETITE OR OVEREATING: 3 - NEARLY EVERY DAY
SUM OF ALL RESPONSES TO PHQ QUESTIONS 1-9: 23
CLINICAL INTERPRETATION OF PHQ2 SCORE: 5

## 2025-03-13 ASSESSMENT — PAIN SCALES - GENERAL: PAINLEVEL_OUTOF10: NO PAIN

## 2025-03-13 ASSESSMENT — ANXIETY QUESTIONNAIRES
GAD7 TOTAL SCORE: 18
6. BECOMING EASILY ANNOYED OR IRRITABLE: MORE THAN HALF THE DAYS
7. FEELING AFRAID AS IF SOMETHING AWFUL MIGHT HAPPEN: NEARLY EVERY DAY
1. FEELING NERVOUS, ANXIOUS, OR ON EDGE: NEARLY EVERY DAY
4. TROUBLE RELAXING: NEARLY EVERY DAY
2. NOT BEING ABLE TO STOP OR CONTROL WORRYING: MORE THAN HALF THE DAYS
5. BEING SO RESTLESS THAT IT IS HARD TO SIT STILL: MORE THAN HALF THE DAYS
3. WORRYING TOO MUCH ABOUT DIFFERENT THINGS: NEARLY EVERY DAY

## 2025-03-13 ASSESSMENT — FIBROSIS 4 INDEX: FIB4 SCORE: 0.81

## 2025-03-13 NOTE — ASSESSMENT & PLAN NOTE
Patient admits to new onset anxiety depression over the past 2 weeks.  States that most of his symptoms are triggered by stress from work.  He states that he is having physical manifestations including indigestion, constipation and general feeling being overwhelmed.  He has never been on medication for anxiety and depression.  Does not have a therapist.  He is applying for short-term disability at work, as he has been having panic attacks and it has been affecting his job.  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  Admits to hopeless thoughts, but no suicidal ideation or plan.  He has a supportive girlfriend.

## 2025-03-13 NOTE — PROGRESS NOTES
Subjective:     CC:   Chief Complaint   Patient presents with    Stress     Work related stress. Panic attacks, anxiety. Applying for short term disability.       HPI:   Diogo presents today to discuss:    Anxiety  Patient admits to new onset anxiety depression over the past 2 weeks.  States that most of his symptoms are triggered by stress from work.  He states that he is having physical manifestations including indigestion, constipation and general feeling being overwhelmed.  He has never been on medication for anxiety and depression.  Does not have a therapist.  He is applying for short-term disability at work, as he has been having panic attacks and it has been affecting his job.  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  Admits to hopeless thoughts, but no suicidal ideation or plan.  He has a supportive girlfriend.    Depression Screening    Little interest or pleasure in doing things?  2 - more than half the days  Feeling down, depressed , or hopeless? 3 - nearly every day  Trouble falling or staying asleep, or sleeping too much?  3 - nearly every day  Feeling tired or having little energy?  2 - more than half the days  Poor appetite or overeating?  3 - nearly every day  Feeling bad about yourself - or that you are a failure or have let yourself or your family down? 3 - nearly every day  Trouble concentrating on things, such as reading the newspaper or watching television? 3 - nearly every day  Moving or speaking so slowly that other people could have noticed.  Or the opposite - being so fidgety or restless that you have been moving around a lot more than usual?  2 - more than half the days  Thoughts that you would be better off dead, or of hurting yourself?  2 - more than half the days  Patient Health Questionnaire Score: 23    USHA-7 Questionnaire    Feeling nervous, anxious, or on edge: Nearly every day  Not being able to sop or control worrying:  More than half the days  Worrying too much about different things: Nearly every day  Trouble relaxing: Nearly every day  Being so restless that it's hard to sit still: More than half the days  Becoming easily annoyed or irritable: More than half the days  Feeling afraid as if something awful might happen: Nearly every day  Total: 18    Past Medical History:   Diagnosis Date    Low serum vitamin B12 2022     Family History   Problem Relation Age of Onset    Diabetes Mother     Stroke Father     Stroke Paternal Grandfather      Past Surgical History:   Procedure Laterality Date    APPENDECTOMY       Social History     Tobacco Use    Smoking status: Former     Current packs/day: 0.00     Types: Cigarettes     Quit date: 2017     Years since quittin.2    Smokeless tobacco: Former    Tobacco comments:     States that he quit in 2017, previous he would smoke when he was stressed.   Vaping Use    Vaping status: Never Used   Substance Use Topics    Alcohol use: Yes     Alcohol/week: 1.8 oz     Types: 3 Standard drinks or equivalent per week     Comment: occasionally     Drug use: Yes     Types: Marijuana, Inhaled     Comment: occasional     Social History     Social History Narrative    Born and raised in [place]    Moved from [facility]         Current Outpatient Medications Ordered in Epic   Medication Sig Dispense Refill    propranolol (INDERAL) 10 MG Tab Take 1 Tablet by mouth 3 times a day as needed (anxiety). 30 Tablet 0    FLUoxetine (PROZAC) 20 MG Cap Take 1 Capsule by mouth every day. 30 Capsule 0    Empagliflozin-metFORMIN HCl (SYNJARDY) 5-1000 MG Tab Take 1 Tablet by mouth 2 times a day. 180 Tablet 1    fenofibrate (TRICOR) 145 MG Tab Take 1 Tablet by mouth every day. 90 Tablet 1    rosuvastatin (CRESTOR) 20 MG Tab Take 1 Tablet by mouth every evening. 90 Tablet 3    sildenafil citrate (VIAGRA) 100 MG tablet Take 1 Tablet by mouth 1 time a day as needed for Erectile  "Dysfunction. 10 Tablet 9    glimepiride (AMARYL) 1 MG tablet Take 1 Tablet by mouth 2 times a day. 180 Tablet 3    diclofenac CR (VOLTAREN-XR) 100 MG TABLET SR 24 HR tablet       aspirin 81 MG EC tablet Take 81 mg by mouth every day.      Coenzyme Q10 (COQ10 PO) Take  by mouth.      Ascorbic Acid (VITAMIN C) 1000 MG Tab Take  by mouth.      Multiple Vitamins-Minerals (MULTIVITAMIN ADULT PO) Take  by mouth.      Calcium Polycarbophil (FIBER-LAX PO) Take  by mouth.      Probiotic Product (PROBIOTIC DAILY PO) Take  by mouth.      IBUPROFEN PO Take  by mouth.       No current Epic-ordered facility-administered medications on file.     Codeine    PMH/PSH/FH/Social history reviewed.  Vaccinations discussed.  Previous records and labs reviewed. Discussed age appropriate anticipatory guidance.    ROS: see hpi  Gen: no fevers/chills  Pulm: no sob, no cough  CV: no chest pain, no palpitations, no edema  GI: no nausea/vomiting, no diarrhea  Skin: no rash    Objective:   Exam:  /80 (BP Location: Right arm, Patient Position: Sitting, BP Cuff Size: Large adult)   Pulse 90   Temp 36.6 °C (97.8 °F) (Temporal)   Resp 16   Ht 1.753 m (5' 9\")   Wt 88.9 kg (196 lb)   SpO2 98%   BMI 28.94 kg/m²    Body mass index is 28.94 kg/m².    Gen: Alert and oriented, mildly anxious  HEENT: Head atraumatic, normocephalic. Pupils equal and round.  Lungs: Normal effort, CTA bilaterally, no wheezes, rhonchi, or rales  CV: Regular rate and rhythm. No murmurs, rubs, or gallops.  Ext: No clubbing, cyanosis, edema.    Assessment & Plan:     49 y.o. male with the following -     1. Encounter for completion of form with patient  Short-term disability form filled out for patient.  Anticipate continued care and treatment for 6 to 12 weeks.  Would recommend weekly therapy with a psychologist.    2. Anxiety  Acute, uncontrolled.  Will trial fluoxetine. Potential side effects include nausea, headache, diarrhea, decreased libido.  If you develop any " of the side effects that last greater than 2 weeks or become severe, please notify my office and seek medical care.  Will use propranolol as needed, side effects may include low blood pressure and low heart rate.  If you develop shortness of breath seek medical evaluation.  - propranolol (INDERAL) 10 MG Tab; Take 1 Tablet by mouth 3 times a day as needed (anxiety).  Dispense: 30 Tablet; Refill: 0  - FLUoxetine (PROZAC) 20 MG Cap; Take 1 Capsule by mouth every day.  Dispense: 30 Capsule; Refill: 0  - Referral to Psychology    3. Current moderate episode of major depressive disorder without prior episode (HCC)  Chronic, uncontrolled.  Trial of Prozac 20 mg daily.  If you ever feel like you may hurt yourself or others, or have thoughts about taking your own life, get help right away. To get help:  Call your local emergency services (911 in the [place]  The [facility] services helpline (211 in the [place]  Go to your nearest emergency department.  Call a suicide hotline to speak with a trained counselor. The following suicide hotlines are available in the [place]:  4-288-968-TALK (1-522.609.9644).  9-719-BAKCCFI (1-447.369.4358).    Return in about 3 weeks (around 4/3/2025) for Medication recheck.    Jane Wells PA-C (Baker)  Physician Assistant Certified  [facility]      Please note that this dictation was created using voice recognition software. I have made every reasonable attempt to correct obvious errors, but I expect that there are errors of grammar and possibly content that I did not discover before finalizing the note.

## 2025-03-13 NOTE — PATIENT INSTRUCTIONS
It was a pleasure meeting with you today at [facility]!    Your medical history/records and medications were reviewed today.     UPDATE on MyChart Results: If you have blood work, and/or imaging studies, or any other test or procedure completed, you will have access to results as soon as they become available in MyChart. Recently, these results will be available for review at the same time that your provider is able to see results!    This will likely mean you will see a result before your provider has had a chance to review and discuss with you.  Some results or care notes may be hard to understand and may be serious in nature.    We look at every result and your provider will contact you to explain what they mean and discuss appropriate next steps. Please allow for at least 72 business hours for chart and result review.     We prefer that you wait for your care team to contact you with your results.  Often, your provider will discuss your results with you at your next appointment. We look forward to continuing to partner with you in your care.    Please review my practice information below:    If you have any prescription refill requests, please send them via [employer] or discuss with your provider at the start of your office visits. Please allow 3-5 business days for lab and testing review and you will be contacted via [employer] with those results, or if advised to make a follow up appointment regarding those results, then please do so.     Once resulted, your lab/test/imaging results will show up automatically in your MyChart. Please wait for my interpretation and recommendations prior to viewing your results to avoid any unnecessary confusion or misinterpretation. I will address all of the lab values that I interpret as abnormal and message you accordingly on your MyChart. I will always send you a message about your results even if they are normal. If you do not hear back from me within 5-7 business  days after completing your tests, then please send me a message on [employer] so I can obtain your results (especially if you went to an outside lab or imaging center - [facility], [facility], etc).     If you have any additional questions or concerns beyond my interpretation of your results, please make an appointment with me to discuss in further detail.    Please only use the [employer] messaging system for questions regarding your most recent appointment or if advised to use otherwise (glucose or blood pressure reporting).     If you have any new problems or concerns, you must make an appointment to discuss. This includes any referral requests, lab requests (unless advised to notify me for pre-appt labs), medication side effects, or request for medication adjustments.     Please arrive 15 minutes prior to your appointment time to complete your check-in and intake with the medical assistant.      Thank you,    Jane Wells PA-C (Baker)  Physician Assistant Certified  [facility]    -----------------------------------------------------------------    Attn: Patients of [facility]:    In an effort to continue to provide excellent and efficient care to our patients, it is vital that we continue to use our resources appropriately. With that, this is a reminder that [employer] is used for prescription refill requests, test results, virtual visits, and chart review only.     Any new questions, concerns/conditions, lab/imaging requests, medication adjustments, new prescriptions, or referral requests do require an appointment (virtually or in person), unless discussed otherwise at your most recent appointment.     Thank you for your understanding,    [facility]

## 2025-03-18 NOTE — Clinical Note
REFERRAL APPROVAL NOTICE         Sent on March 18, 2025                   Diogo Keen  [address]  [place] [address]                   Dear Mr. Keen,    After a careful review of the medical information and benefit coverage, [facility] has processed your referral. See below for additional details.    If applicable, you must be actively enrolled with your insurance for coverage of the authorized service. If you have any questions regarding your coverage, please contact your insurance directly.    REFERRAL INFORMATION   Referral #:  [id number]  Referred-To Provider    Referred-By Provider:  Psychologist    Jane Wells P.A.-C.   [address] [place] [address]  [phone] [address]  [place] [address]  [phone]    Referral Start Date:  03/13/2025  Referral End Date:   03/13/2026             SCHEDULING  If you do not already have an appointment, please call [phone] to make an appointment.     MORE INFORMATION  If you do not already have a [employer] account, sign up at: [email or web address]  You can access your medical information, make appointments, see lab results, billing information, and more.  If you have questions regarding this referral, please contact  the [facility] Referrals department at:             [phone]. Monday - Friday 8:00AM - 5:00PM.     Sincerely,    [facility]

## 2025-03-19 ENCOUNTER — APPOINTMENT (OUTPATIENT)
Dept: MEDICAL GROUP | Facility: IMAGING CENTER | Age: 50
End: 2025-03-19
Payer: COMMERCIAL

## 2025-04-04 DIAGNOSIS — F41.9 ANXIETY: ICD-10-CM

## 2025-04-04 NOTE — TELEPHONE ENCOUNTER
Received request via: Pharmacy    Was the patient seen in the last year in this department? Yes    Does the patient have an active prescription (recently filled or refills available) for medication(s) requested? No    Pharmacy Name: [id number]    Does the patient have FPC Plus and need 100-day supply? (This applies to ALL medications) Patient does not have SCP    Pharmacy comment: REQUEST FOR 90 DAYS PRESCRIPTION. DX Code Needed.

## 2025-04-08 ENCOUNTER — APPOINTMENT (OUTPATIENT)
Dept: MEDICAL GROUP | Facility: IMAGING CENTER | Age: 50
End: 2025-04-08
Payer: COMMERCIAL

## 2025-04-10 ENCOUNTER — OFFICE VISIT (OUTPATIENT)
Dept: MEDICAL GROUP | Facility: IMAGING CENTER | Age: 50
End: 2025-04-10
Payer: COMMERCIAL

## 2025-04-10 VITALS
WEIGHT: 198 LBS | OXYGEN SATURATION: 97 % | HEART RATE: 76 BPM | SYSTOLIC BLOOD PRESSURE: 124 MMHG | HEIGHT: 69 IN | BODY MASS INDEX: 29.33 KG/M2 | DIASTOLIC BLOOD PRESSURE: 78 MMHG | RESPIRATION RATE: 14 BRPM | TEMPERATURE: 97.9 F

## 2025-04-10 DIAGNOSIS — F41.9 ANXIETY: ICD-10-CM

## 2025-04-10 PROCEDURE — 3074F SYST BP LT 130 MM HG: CPT | Performed by: PHYSICIAN ASSISTANT

## 2025-04-10 PROCEDURE — 99214 OFFICE O/P EST MOD 30 MIN: CPT | Performed by: PHYSICIAN ASSISTANT

## 2025-04-10 PROCEDURE — 3078F DIAST BP <80 MM HG: CPT | Performed by: PHYSICIAN ASSISTANT

## 2025-04-10 RX ORDER — BUPROPION HYDROCHLORIDE 150 MG/1
150 TABLET ORAL EVERY MORNING
Qty: 30 TABLET | Refills: 0 | Status: SHIPPED | OUTPATIENT
Start: 2025-04-10

## 2025-04-10 ASSESSMENT — FIBROSIS 4 INDEX: FIB4 SCORE: 0.81

## 2025-04-10 NOTE — ASSESSMENT & PLAN NOTE
Patient states that he has noted some improvement in his general anxiety, as well as sleeping habits since starting Prozac.  He states he is no longer having frequent awakenings and difficulty falling back asleep.  He does note though that he has had decreased libido since taking the medication.  He is interested in trying a different medication because of this.  Has not tried propranolol yet.  He also had his first therapy session on Tuesday and will be going weekly.

## 2025-04-10 NOTE — PATIENT INSTRUCTIONS
It was a pleasure meeting with you today at [facility]!    Your medical history/records and medications were reviewed today.     UPDATE on MyChart Results: If you have blood work, and/or imaging studies, or any other test or procedure completed, you will have access to results as soon as they become available in MyChart. Recently, these results will be available for review at the same time that your provider is able to see results!    This will likely mean you will see a result before your provider has had a chance to review and discuss with you.  Some results or care notes may be hard to understand and may be serious in nature.    We look at every result and your provider will contact you to explain what they mean and discuss appropriate next steps. Please allow for at least 72 business hours for chart and result review.     We prefer that you wait for your care team to contact you with your results.  Often, your provider will discuss your results with you at your next appointment. We look forward to continuing to partner with you in your care.    Please review my practice information below:    If you have any prescription refill requests, please send them via [employer] or discuss with your provider at the start of your office visits. Please allow 3-5 business days for lab and testing review and you will be contacted via [employer] with those results, or if advised to make a follow up appointment regarding those results, then please do so.     Once resulted, your lab/test/imaging results will show up automatically in your MyChart. Please wait for my interpretation and recommendations prior to viewing your results to avoid any unnecessary confusion or misinterpretation. I will address all of the lab values that I interpret as abnormal and message you accordingly on your MyChart. I will always send you a message about your results even if they are normal. If you do not hear back from me within 5-7 business  days after completing your tests, then please send me a message on [employer] so I can obtain your results (especially if you went to an outside lab or imaging center - [facility], [facility], etc).     If you have any additional questions or concerns beyond my interpretation of your results, please make an appointment with me to discuss in further detail.    Please only use the [employer] messaging system for questions regarding your most recent appointment or if advised to use otherwise (glucose or blood pressure reporting).     If you have any new problems or concerns, you must make an appointment to discuss. This includes any referral requests, lab requests (unless advised to notify me for pre-appt labs), medication side effects, or request for medication adjustments.     Please arrive 15 minutes prior to your appointment time to complete your check-in and intake with the medical assistant.      Thank you,    Jane Wells PA-C (Baker)  Physician Assistant Certified  [facility]    -----------------------------------------------------------------    Attn: Patients of [facility]:    In an effort to continue to provide excellent and efficient care to our patients, it is vital that we continue to use our resources appropriately. With that, this is a reminder that [employer] is used for prescription refill requests, test results, virtual visits, and chart review only.     Any new questions, concerns/conditions, lab/imaging requests, medication adjustments, new prescriptions, or referral requests do require an appointment (virtually or in person), unless discussed otherwise at your most recent appointment.     Thank you for your understanding,    [facility]

## 2025-04-10 NOTE — PROGRESS NOTES
Subjective:     CC:   Chief Complaint   Patient presents with    Follow-Up       HPI:   Diogo presents today to discuss:    Anxiety  Patient states that he has noted some improvement in his general anxiety, as well as sleeping habits since starting Prozac.  He states he is no longer having frequent awakenings and difficulty falling back asleep.  He does note though that he has had decreased libido since taking the medication.  He is interested in trying a different medication because of this.  Has not tried propranolol yet.  He also had his first therapy session on Tuesday and will be going weekly.    Past Medical History:   Diagnosis Date    Low serum vitamin B12 2022     Family History   Problem Relation Age of Onset    Diabetes Mother     Stroke Father     Stroke Paternal Grandfather      Past Surgical History:   Procedure Laterality Date    APPENDECTOMY       Social History     Tobacco Use    Smoking status: Former     Current packs/day: 0.00     Types: Cigarettes     Quit date: 2017     Years since quittin.3    Smokeless tobacco: Former    Tobacco comments:     States that he quit in 2017, previous he would smoke when he was stressed.   Vaping Use    Vaping status: Never Used   Substance Use Topics    Alcohol use: Yes     Alcohol/week: 1.8 oz     Types: 3 Standard drinks or equivalent per week     Comment: occasionally     Drug use: Yes     Types: Marijuana, Inhaled     Comment: occasional     Social History     Social History Narrative    Born and raised in [place]    Moved from [facility]         Current Outpatient Medications Ordered in Epic   Medication Sig Dispense Refill    buPROPion (WELLBUTRIN XL) 150 MG XL tablet Take 1 Tablet by mouth every morning. 30 Tablet 0    propranolol (INDERAL) 10 MG Tab Take 1 Tablet by mouth 3 times a day as needed (anxiety). 30 Tablet 0    Empagliflozin-metFORMIN HCl (SYNJARDY) 5-1000 MG Tab Take 1 Tablet by mouth 2 times a day. 180  "Tablet 1    fenofibrate (TRICOR) 145 MG Tab Take 1 Tablet by mouth every day. 90 Tablet 1    rosuvastatin (CRESTOR) 20 MG Tab Take 1 Tablet by mouth every evening. 90 Tablet 3    sildenafil citrate (VIAGRA) 100 MG tablet Take 1 Tablet by mouth 1 time a day as needed for Erectile Dysfunction. 10 Tablet 9    glimepiride (AMARYL) 1 MG tablet Take 1 Tablet by mouth 2 times a day. 180 Tablet 3    diclofenac CR (VOLTAREN-XR) 100 MG TABLET SR 24 HR tablet       aspirin 81 MG EC tablet Take 81 mg by mouth every day.      Coenzyme Q10 (COQ10 PO) Take  by mouth.      Ascorbic Acid (VITAMIN C) 1000 MG Tab Take  by mouth.      Multiple Vitamins-Minerals (MULTIVITAMIN ADULT PO) Take  by mouth.      Calcium Polycarbophil (FIBER-LAX PO) Take  by mouth.      Probiotic Product (PROBIOTIC DAILY PO) Take  by mouth.      IBUPROFEN PO Take  by mouth.       No current Epic-ordered facility-administered medications on file.     Codeine    PMH/PSH/FH/Social history reviewed.  Vaccinations discussed.  Previous records and labs reviewed. Discussed age appropriate anticipatory guidance.    ROS: see hpi  Gen: no fevers/chills  Pulm: no sob, no cough  CV: no chest pain, no palpitations, no edema  GI: no nausea/vomiting, no diarrhea  Skin: no rash    Objective:   Exam:  /78 (BP Location: Right arm, Patient Position: Sitting, BP Cuff Size: Adult)   Pulse 76   Temp 36.6 °C (97.9 °F) (Temporal)   Resp 14   Ht 1.753 m (5' 9\")   Wt 89.8 kg (198 lb)   SpO2 97%   BMI 29.24 kg/m²    Body mass index is 29.24 kg/m².    Gen: Alert and oriented, No apparent distress.  Well-groomed.  Good eye contact.  Answers questions appropriately.  Smiles appropriately.  HEENT: Head atraumatic, normocephalic. Pupils equal and round.  Ext: No clubbing, cyanosis, edema.    Assessment & Plan:     49 y.o. male with the following -     1. Anxiety  Chronic, uncontrolled but improving.  Decrease libido from SSRI.  Will switch to Wellbutrin.  Take first thing in the " morning.  Reassess in 3 to 4 weeks.  Continue talk therapy.  - buPROPion (WELLBUTRIN XL) 150 MG XL tablet; Take 1 Tablet by mouth every morning.  Dispense: 30 Tablet; Refill: 0    Return in about 4 weeks (around 5/8/2025) for Medication recheck, Follow-up labs/tests.    Jane Wells PA-C (Baker)  Physician Assistant Certified  [facility]      Please note that this dictation was created using voice recognition software. I have made every reasonable attempt to correct obvious errors, but I expect that there are errors of grammar and possibly content that I did not discover before finalizing the note.

## 2025-05-02 DIAGNOSIS — F41.9 ANXIETY: ICD-10-CM

## 2025-05-02 NOTE — TELEPHONE ENCOUNTER
Received request via: Pharmacy    Was the patient seen in the last year in this department? Yes    Does the patient have an active prescription (recently filled or refills available) for medication(s) requested? No    Pharmacy Name: [id number]    Does the patient have custodial Plus and need 100-day supply? (This applies to ALL medications) Patient does not have SCP    Pharmacy comment: REQUEST FOR 90 DAYS PRESCRIPTION. DX Code Needed.

## 2025-05-05 RX ORDER — BUPROPION HYDROCHLORIDE 150 MG/1
150 TABLET ORAL EVERY MORNING
Qty: 90 TABLET | Refills: 1 | Status: SHIPPED | OUTPATIENT
Start: 2025-05-05 | End: 2025-05-20 | Stop reason: SDUPTHER

## 2025-05-19 ENCOUNTER — HOSPITAL ENCOUNTER (OUTPATIENT)
Dept: LAB | Facility: MEDICAL CENTER | Age: 50
End: 2025-05-19
Attending: PHYSICIAN ASSISTANT
Payer: COMMERCIAL

## 2025-05-19 DIAGNOSIS — Z13.220 SCREENING CHOLESTEROL LEVEL: ICD-10-CM

## 2025-05-19 DIAGNOSIS — E78.2 MIXED HYPERLIPIDEMIA: ICD-10-CM

## 2025-05-19 DIAGNOSIS — E55.9 VITAMIN D DEFICIENCY: ICD-10-CM

## 2025-05-19 DIAGNOSIS — E11.65 TYPE 2 DIABETES MELLITUS WITH HYPERGLYCEMIA, WITHOUT LONG-TERM CURRENT USE OF INSULIN (HCC): ICD-10-CM

## 2025-05-19 DIAGNOSIS — Z12.5 PROSTATE CANCER SCREENING: ICD-10-CM

## 2025-05-19 DIAGNOSIS — Z13.0 SCREENING FOR DEFICIENCY ANEMIA: ICD-10-CM

## 2025-05-19 LAB
BASOPHILS # BLD AUTO: 1.1 % (ref 0–1.8)
BASOPHILS # BLD: 0.06 K/UL (ref 0–0.12)
EOSINOPHIL # BLD AUTO: 0.15 K/UL (ref 0–0.51)
EOSINOPHIL NFR BLD: 2.7 % (ref 0–6.9)
ERYTHROCYTE [DISTWIDTH] IN BLOOD BY AUTOMATED COUNT: 44.6 FL (ref 35.9–50)
EST. AVERAGE GLUCOSE BLD GHB EST-MCNC: 157 MG/DL
HBA1C MFR BLD: 7.1 % (ref 4–5.6)
HCT VFR BLD AUTO: 51.7 % (ref 42–52)
HGB BLD-MCNC: 16.6 G/DL (ref 14–18)
IMM GRANULOCYTES # BLD AUTO: 0.02 K/UL (ref 0–0.11)
IMM GRANULOCYTES NFR BLD AUTO: 0.4 % (ref 0–0.9)
LYMPHOCYTES # BLD AUTO: 2.03 K/UL (ref 1–4.8)
LYMPHOCYTES NFR BLD: 36.3 % (ref 22–41)
MCH RBC QN AUTO: 28.8 PG (ref 27–33)
MCHC RBC AUTO-ENTMCNC: 32.1 G/DL (ref 32.3–36.5)
MCV RBC AUTO: 89.8 FL (ref 81.4–97.8)
MONOCYTES # BLD AUTO: 0.54 K/UL (ref 0–0.85)
MONOCYTES NFR BLD AUTO: 9.6 % (ref 0–13.4)
NEUTROPHILS # BLD AUTO: 2.8 K/UL (ref 1.82–7.42)
NEUTROPHILS NFR BLD: 49.9 % (ref 44–72)
NRBC # BLD AUTO: 0 K/UL
NRBC BLD-RTO: 0 /100 WBC (ref 0–0.2)
PLATELET # BLD AUTO: 237 K/UL (ref 164–446)
PMV BLD AUTO: 9.8 FL (ref 9–12.9)
RBC # BLD AUTO: 5.76 M/UL (ref 4.7–6.1)
WBC # BLD AUTO: 5.6 K/UL (ref 4.8–10.8)

## 2025-05-19 PROCEDURE — 80053 COMPREHEN METABOLIC PANEL: CPT

## 2025-05-19 PROCEDURE — 83036 HEMOGLOBIN GLYCOSYLATED A1C: CPT

## 2025-05-19 PROCEDURE — 82306 VITAMIN D 25 HYDROXY: CPT

## 2025-05-19 PROCEDURE — 85025 COMPLETE CBC W/AUTO DIFF WBC: CPT

## 2025-05-19 PROCEDURE — 36415 COLL VENOUS BLD VENIPUNCTURE: CPT

## 2025-05-19 PROCEDURE — 80061 LIPID PANEL: CPT

## 2025-05-19 PROCEDURE — 84153 ASSAY OF PSA TOTAL: CPT

## 2025-05-20 ENCOUNTER — OFFICE VISIT (OUTPATIENT)
Dept: MEDICAL GROUP | Facility: IMAGING CENTER | Age: 50
End: 2025-05-20
Payer: COMMERCIAL

## 2025-05-20 ENCOUNTER — RESULTS FOLLOW-UP (OUTPATIENT)
Dept: MEDICAL GROUP | Facility: IMAGING CENTER | Age: 50
End: 2025-05-20

## 2025-05-20 VITALS
WEIGHT: 203 LBS | OXYGEN SATURATION: 98 % | TEMPERATURE: 97.8 F | SYSTOLIC BLOOD PRESSURE: 120 MMHG | DIASTOLIC BLOOD PRESSURE: 66 MMHG | RESPIRATION RATE: 16 BRPM | HEIGHT: 69 IN | HEART RATE: 78 BPM | BODY MASS INDEX: 30.07 KG/M2

## 2025-05-20 DIAGNOSIS — N52.9 ERECTILE DYSFUNCTION, UNSPECIFIED ERECTILE DYSFUNCTION TYPE: ICD-10-CM

## 2025-05-20 DIAGNOSIS — F41.9 ANXIETY: ICD-10-CM

## 2025-05-20 DIAGNOSIS — E78.2 MIXED HYPERLIPIDEMIA: ICD-10-CM

## 2025-05-20 DIAGNOSIS — Z23 NEED FOR VACCINATION: Primary | ICD-10-CM

## 2025-05-20 DIAGNOSIS — F51.01 PRIMARY INSOMNIA: ICD-10-CM

## 2025-05-20 DIAGNOSIS — E11.65 TYPE 2 DIABETES MELLITUS WITH HYPERGLYCEMIA, WITHOUT LONG-TERM CURRENT USE OF INSULIN (HCC): ICD-10-CM

## 2025-05-20 DIAGNOSIS — E55.9 VITAMIN D DEFICIENCY: ICD-10-CM

## 2025-05-20 LAB
25(OH)D3 SERPL-MCNC: 34 NG/ML (ref 30–100)
ALBUMIN SERPL BCP-MCNC: 4.6 G/DL (ref 3.2–4.9)
ALBUMIN/GLOB SERPL: 1.8 G/DL
ALP SERPL-CCNC: 66 U/L (ref 30–99)
ALT SERPL-CCNC: 32 U/L (ref 2–50)
ANION GAP SERPL CALC-SCNC: 12 MMOL/L (ref 7–16)
AST SERPL-CCNC: 27 U/L (ref 12–45)
BILIRUB SERPL-MCNC: 0.6 MG/DL (ref 0.1–1.5)
BUN SERPL-MCNC: 18 MG/DL (ref 8–22)
CALCIUM ALBUM COR SERPL-MCNC: 8.6 MG/DL (ref 8.5–10.5)
CALCIUM SERPL-MCNC: 9.1 MG/DL (ref 8.5–10.5)
CHLORIDE SERPL-SCNC: 107 MMOL/L (ref 96–112)
CHOLEST SERPL-MCNC: 137 MG/DL (ref 100–199)
CO2 SERPL-SCNC: 21 MMOL/L (ref 20–33)
CREAT SERPL-MCNC: 1.03 MG/DL (ref 0.5–1.4)
GFR SERPLBLD CREATININE-BSD FMLA CKD-EPI: 87 ML/MIN/1.73 M 2
GLOBULIN SER CALC-MCNC: 2.6 G/DL (ref 1.9–3.5)
GLUCOSE SERPL-MCNC: 128 MG/DL (ref 65–99)
HDLC SERPL-MCNC: 36 MG/DL
LDLC SERPL CALC-MCNC: 59 MG/DL
POTASSIUM SERPL-SCNC: 4.4 MMOL/L (ref 3.6–5.5)
PROT SERPL-MCNC: 7.2 G/DL (ref 6–8.2)
PSA SERPL DL<=0.01 NG/ML-MCNC: 0.81 NG/ML (ref 0–4)
SODIUM SERPL-SCNC: 140 MMOL/L (ref 135–145)
TRIGL SERPL-MCNC: 210 MG/DL (ref 0–149)

## 2025-05-20 PROCEDURE — 90471 IMMUNIZATION ADMIN: CPT | Performed by: PHYSICIAN ASSISTANT

## 2025-05-20 PROCEDURE — 90746 HEPB VACCINE 3 DOSE ADULT IM: CPT | Performed by: PHYSICIAN ASSISTANT

## 2025-05-20 PROCEDURE — 1126F AMNT PAIN NOTED NONE PRSNT: CPT | Performed by: PHYSICIAN ASSISTANT

## 2025-05-20 PROCEDURE — 3074F SYST BP LT 130 MM HG: CPT | Performed by: PHYSICIAN ASSISTANT

## 2025-05-20 PROCEDURE — 99214 OFFICE O/P EST MOD 30 MIN: CPT | Mod: 25 | Performed by: PHYSICIAN ASSISTANT

## 2025-05-20 PROCEDURE — 3078F DIAST BP <80 MM HG: CPT | Performed by: PHYSICIAN ASSISTANT

## 2025-05-20 RX ORDER — FENOFIBRATE 145 MG/1
145 TABLET, FILM COATED ORAL DAILY
Qty: 90 TABLET | Refills: 0 | Status: SHIPPED | OUTPATIENT
Start: 2025-05-20

## 2025-05-20 RX ORDER — BUPROPION HYDROCHLORIDE 150 MG/1
150 TABLET ORAL EVERY MORNING
Qty: 90 TABLET | Refills: 0 | Status: SHIPPED | OUTPATIENT
Start: 2025-05-20

## 2025-05-20 RX ORDER — SILDENAFIL 100 MG/1
100 TABLET, FILM COATED ORAL
Qty: 10 TABLET | Refills: 9 | Status: SHIPPED | OUTPATIENT
Start: 2025-05-20

## 2025-05-20 RX ORDER — EMPAGLIFLOZIN AND METFORMIN HYDROCHLORIDE 5; 1000 MG/1; MG/1
1 TABLET ORAL 2 TIMES DAILY
Qty: 180 TABLET | Refills: 0 | Status: SHIPPED | OUTPATIENT
Start: 2025-05-20

## 2025-05-20 RX ORDER — GLIMEPIRIDE 1 MG/1
1 TABLET ORAL 2 TIMES DAILY
Qty: 180 TABLET | Refills: 0 | Status: SHIPPED | OUTPATIENT
Start: 2025-05-20

## 2025-05-20 RX ORDER — ROSUVASTATIN CALCIUM 20 MG/1
20 TABLET, COATED ORAL EVERY EVENING
Qty: 90 TABLET | Refills: 0 | Status: SHIPPED | OUTPATIENT
Start: 2025-05-20

## 2025-05-20 RX ORDER — TRAZODONE HYDROCHLORIDE 50 MG/1
50 TABLET ORAL NIGHTLY PRN
Qty: 90 TABLET | Refills: 0 | Status: SHIPPED | OUTPATIENT
Start: 2025-05-20

## 2025-05-20 RX ORDER — ERGOCALCIFEROL 1.25 MG/1
50000 CAPSULE, LIQUID FILLED ORAL
Qty: 4 CAPSULE | Refills: 0 | Status: SHIPPED | OUTPATIENT
Start: 2025-05-20

## 2025-05-20 ASSESSMENT — PAIN SCALES - GENERAL: PAINLEVEL_OUTOF10: NO PAIN

## 2025-05-20 ASSESSMENT — FIBROSIS 4 INDEX: FIB4 SCORE: 1.03

## 2025-05-20 NOTE — PATIENT INSTRUCTIONS
It was a pleasure meeting with you today at [facility]!    Your medical history/records and medications were reviewed today.     UPDATE on MyChart Results: If you have blood work, and/or imaging studies, or any other test or procedure completed, you will have access to results as soon as they become available in MyChart. Recently, these results will be available for review at the same time that your provider is able to see results!    This will likely mean you will see a result before your provider has had a chance to review and discuss with you.  Some results or care notes may be hard to understand and may be serious in nature.    We look at every result and your provider will contact you to explain what they mean and discuss appropriate next steps. Please allow for at least 72 business hours for chart and result review.     We prefer that you wait for your care team to contact you with your results.  Often, your provider will discuss your results with you at your next appointment. We look forward to continuing to partner with you in your care.    Please review my practice information below:    If you have any prescription refill requests, please send them via [employer] or discuss with your provider at the start of your office visits. Please allow 3-5 business days for lab and testing review and you will be contacted via [employer] with those results, or if advised to make a follow up appointment regarding those results, then please do so.     Once resulted, your lab/test/imaging results will show up automatically in your MyChart. Please wait for my interpretation and recommendations prior to viewing your results to avoid any unnecessary confusion or misinterpretation. I will address all of the lab values that I interpret as abnormal and message you accordingly on your MyChart. I will always send you a message about your results even if they are normal. If you do not hear back from me within 5-7 business  days after completing your tests, then please send me a message on [email or web address] so I can obtain your results (especially if you went to an outside lab or imaging center - [facility], [facility], etc).     If you have any additional questions or concerns beyond my interpretation of your results, please make an appointment with me to discuss in further detail.    Please only use the [email or web address] messaging system for questions regarding your most recent appointment or if advised to use otherwise (glucose or blood pressure reporting).     If you have any new problems or concerns, you must make an appointment to discuss. This includes any referral requests, lab requests (unless advised to notify me for pre-appt labs), medication side effects, or request for medication adjustments.     Please arrive 15 minutes prior to your appointment time to complete your check-in and intake with the medical assistant.      Thank you,    Jane Wells PA-C ([place])  Physician Assistant Certified  [facility]    -----------------------------------------------------------------    Attn: Patients of [facility]:    In an effort to continue to provide excellent and efficient care to our patients, it is vital that we continue to use our resources appropriately. With that, this is a reminder that [email or web address] is used for prescription refill requests, test results, virtual visits, and chart review only.     Any new questions, concerns/conditions, lab/imaging requests, medication adjustments, new prescriptions, or referral requests do require an appointment (virtually or in person), unless discussed otherwise at your most recent appointment.     Thank you for your understanding,    [facility]

## 2025-05-20 NOTE — ASSESSMENT & PLAN NOTE
Patient admits to difficulty staying asleep.  He has been taking melatonin without relief.  Has been more anxious due to his upcoming move to [place].  Will be moving in 3 weeks.

## 2025-05-20 NOTE — ASSESSMENT & PLAN NOTE
Latest Reference Range & Units 06/15/24 08:54 05/19/25 08:40   Cholesterol,Tot 100 - 199 mg/dL 126 137   Triglycerides 0 - 149 mg/dL 132 210 (H)   HDL >=40 mg/dL 42 36 !   LDL <100 mg/dL 58 59   (H): Data is abnormally high  !: Data is abnormal    Chronic, patient states that he ate pizza the night before his lab draw.  He is compliant with rosuvastatin and fenofibrate, needs refills today, as he will be moving out of [place] in 3 weeks.

## 2025-05-20 NOTE — ASSESSMENT & PLAN NOTE
Chronic, symptoms improved with Wellbutrin 150 mg every morning.  Needs refill today.  Has not been using propranolol.

## 2025-05-20 NOTE — PROGRESS NOTES
Subjective:     CC:   Chief Complaint   Patient presents with    Follow-Up     On lab results from 05/19/25    Medication Management     Refills on all medications. Moving to [place]     Difficulty Sleeping       HPI:   Diogo presents today to discuss:    Type 2 diabetes mellitus with hyperglycemia, without long-term current use of insulin (HCC)   Latest Reference Range & Units 05/14/24 15:35 03/06/25 07:42 05/19/25 08:40   Glycohemoglobin 4.0 - 5.6 % 7.6 ! 7.0 ! 7.1 (H)   !: Data is abnormal  (H): Data is abnormally high    Chronic, controlled on Synjardy 5-1000 twice a day, glimepiride 1 mg twice a day.  On statin.  No microalbuminuria.    Mixed hyperlipidemia   Latest Reference Range & Units 06/15/24 08:54 05/19/25 08:40   Cholesterol,Tot 100 - 199 mg/dL 126 137   Triglycerides 0 - 149 mg/dL 132 210 (H)   HDL >=40 mg/dL 42 36 !   LDL <100 mg/dL 58 59   (H): Data is abnormally high  !: Data is abnormal    Chronic, patient states that he ate pizza the night before his lab draw.  He is compliant with rosuvastatin and fenofibrate, needs refills today, as he will be moving out of state in 3 weeks.    Primary insomnia  Patient admits to difficulty staying asleep.  He has been taking melatonin without relief.  Has been more anxious due to his upcoming move to [place].  Will be moving in 3 weeks.    Anxiety  Chronic, symptoms improved with Wellbutrin 150 mg every morning.  Needs refill today.  Has not been using propranolol.      Past Medical History[1]  Family History   Problem Relation Age of Onset    Diabetes Mother     Stroke Father     Stroke Paternal Grandfather      Past Surgical History[2]  Social History[3]  Social History     Social History Narrative    Born and raised in [place]    Moved from [facility] ed         Current Medications and Prescriptions Ordered in Epic[4]  Codeine    PMH/PSH/FH/Social history reviewed.  Vaccinations discussed.  Previous records and labs reviewed. Discussed  "age appropriate anticipatory guidance.    ROS: see hpi  Gen: no fevers/chills  Pulm: no sob, no cough  CV: no chest pain, no palpitations, no edema  GI: no nausea/vomiting, no diarrhea  Skin: no rash    Objective:   Exam:  /66 (BP Location: Right arm, Patient Position: Sitting, BP Cuff Size: Large adult)   Pulse 78   Temp 36.6 °C (97.8 °F) (Temporal)   Resp 16   Ht 1.753 m (5' 9\")   Wt 92.1 kg (203 lb)   SpO2 98%   BMI 29.98 kg/m²    Body mass index is 29.98 kg/m².    Gen: Alert and oriented, No apparent distress.  HEENT: Head atraumatic, normocephalic. Pupils equal and round.  Neck: Neck is supple without lymphadenopathy.   Lungs: Normal effort, CTA bilaterally, no wheezes, rhonchi, or rales  CV: Regular rate and rhythm. No murmurs, rubs, or gallops.  Ext: No clubbing, cyanosis, edema.    Assessment & Plan:     49 y.o. male with the following -     1. Type 2 diabetes mellitus with hyperglycemia, without long-term current use of insulin (HCC)  Chronic, controlled and stable. Continue current regimen -recommend possible GLP-1 analog, patient discussed with new PCP in [place].  - Empagliflozin-metFORMIN HCl (SYNJARDY) 5-1000 MG Tab; Take 1 Tablet by mouth 2 times a day.  Dispense: 180 Tablet; Refill: 0  - glimepiride (AMARYL) 1 MG tablet; Take 1 Tablet by mouth 2 times a day.  Dispense: 180 Tablet; Refill: 0    2. Mixed hyperlipidemia  Chronic, controlled and stable. Continue current regimen -   - fenofibrate (TRICOR) 145 MG Tab; Take 1 Tablet by mouth every day.  Dispense: 90 Tablet; Refill: 0  - rosuvastatin (CRESTOR) 20 MG Tab; Take 1 Tablet by mouth every evening.  Dispense: 90 Tablet; Refill: 0    3. Primary insomnia  Chronic, uncontrolled.  Trial trazodone 1 hour before bed.  - traZODone (DESYREL) 50 MG Tab; Take 1 Tablet by mouth at bedtime as needed for Sleep.  Dispense: 90 Tablet; Refill: 0    4. Anxiety  Chronic, controlled and stable. Continue current regimen -   - buPROPion (WELLBUTRIN XL) " 150 MG XL tablet; Take 1 Tablet by mouth every morning.  Dispense: 90 Tablet; Refill: 0    5. Erectile dysfunction, unspecified erectile dysfunction type  - sildenafil citrate (VIAGRA) 100 MG tablet; Take 1 Tablet by mouth 1 time a day as needed for Erectile Dysfunction.  Dispense: 10 Tablet; Refill: 9    6. Vitamin D deficiency  - vitamin D2, Ergocalciferol, (DRISDOL) 1.25 MG (28265 UT) Cap capsule; Take 1 Capsule by mouth every 7 days.  Dispense: 4 Capsule; Refill: 0    7. Need for vaccination (Primary)  - Hepatitis B Vaccine Adult 20+    Return for As needed.    Jane Wells PA-C ([place])  Physician Assistant Certified  [facility]      Please note that this dictation was created using voice recognition software. I have made every reasonable attempt to correct obvious errors, but I expect that there are errors of grammar and possibly content that I did not discover before finalizing the note.         [1]   Past Medical History:  Diagnosis Date    Low serum vitamin B12 2022   [2]   Past Surgical History:  Procedure Laterality Date    APPENDECTOMY     [3]   Social History  Tobacco Use    Smoking status: Former     Current packs/day: 0.00     Types: Cigarettes     Quit date: 2017     Years since quittin.4    Smokeless tobacco: Former    Tobacco comments:     States that he quit in 2017, previous he would smoke when he was stressed.   Vaping Use    Vaping status: Never Used   Substance Use Topics    Alcohol use: Yes     Alcohol/week: 1.8 oz     Types: 3 Standard drinks or equivalent per week     Comment: occasionally     Drug use: Yes     Types: Marijuana, Inhaled     Comment: occasional   [4]   Current Outpatient Medications Ordered in Epic   Medication Sig Dispense Refill    traZODone (DESYREL) 50 MG Tab Take 1 Tablet by mouth at bedtime as needed for Sleep. 90 Tablet 0    buPROPion (WELLBUTRIN XL) 150 MG XL tablet Take 1 Tablet by mouth every morning. 90 Tablet 0     Empagliflozin-metFORMIN HCl (SYNJARDY) 5-1000 MG Tab Take 1 Tablet by mouth 2 times a day. 180 Tablet 0    fenofibrate (TRICOR) 145 MG Tab Take 1 Tablet by mouth every day. 90 Tablet 0    glimepiride (AMARYL) 1 MG tablet Take 1 Tablet by mouth 2 times a day. 180 Tablet 0    rosuvastatin (CRESTOR) 20 MG Tab Take 1 Tablet by mouth every evening. 90 Tablet 0    sildenafil citrate (VIAGRA) 100 MG tablet Take 1 Tablet by mouth 1 time a day as needed for Erectile Dysfunction. 10 Tablet 9    vitamin D2, Ergocalciferol, (DRISDOL) 1.25 MG (30129 UT) Cap capsule Take 1 Capsule by mouth every 7 days. 4 Capsule 0    diclofenac CR (VOLTAREN-XR) 100 MG TABLET SR 24 HR tablet       aspirin 81 MG EC tablet Take 81 mg by mouth every day.      Coenzyme Q10 (COQ10 PO) Take  by mouth.      Ascorbic Acid (VITAMIN C) 1000 MG Tab Take  by mouth.      Multiple Vitamins-Minerals (MULTIVITAMIN ADULT PO) Take  by mouth.      Calcium Polycarbophil (FIBER-LAX PO) Take  by mouth.      Probiotic Product (PROBIOTIC DAILY PO) Take  by mouth.      IBUPROFEN PO Take  by mouth.       No current Epic-ordered facility-administered medications on file.

## 2025-05-20 NOTE — ASSESSMENT & PLAN NOTE
Latest Reference Range & Units 05/14/24 15:35 03/06/25 07:42 05/19/25 08:40   Glycohemoglobin 4.0 - 5.6 % 7.6 ! 7.0 ! 7.1 (H)   !: Data is abnormal  (H): Data is abnormally high    Chronic, controlled on Synjardy 5-1000 twice a day, glimepiride 1 mg twice a day.  On statin.  No microalbuminuria.

## 2025-06-11 DIAGNOSIS — E55.9 VITAMIN D DEFICIENCY: ICD-10-CM

## 2025-06-11 RX ORDER — ERGOCALCIFEROL 1.25 MG/1
50000 CAPSULE, LIQUID FILLED ORAL
Qty: 12 CAPSULE | Refills: 0 | Status: SHIPPED | OUTPATIENT
Start: 2025-06-11

## 2025-06-11 NOTE — TELEPHONE ENCOUNTER
Received request via: Pharmacy    Was the patient seen in the last year in this department? Yes    Does the patient have an active prescription (recently filled or refills available) for medication(s) requested? No    Pharmacy Name: [id number]    Does the patient have [facility] Plus and need 100-day supply? (This applies to ALL medications) Patient does not have SCP      Pharmacy comment: REQUEST FOR 90 DAYS PRESCRIPTION. DX Code Needed.

## 2025-08-17 DIAGNOSIS — E11.65 TYPE 2 DIABETES MELLITUS WITH HYPERGLYCEMIA, WITHOUT LONG-TERM CURRENT USE OF INSULIN (HCC): ICD-10-CM

## 2025-08-17 DIAGNOSIS — F51.01 PRIMARY INSOMNIA: ICD-10-CM

## 2025-08-18 RX ORDER — TRAZODONE HYDROCHLORIDE 50 MG/1
50 TABLET ORAL
Qty: 90 TABLET | Refills: 0 | Status: SHIPPED | OUTPATIENT
Start: 2025-08-18

## 2025-08-18 RX ORDER — GLIMEPIRIDE 1 MG/1
1 TABLET ORAL 2 TIMES DAILY
Qty: 180 TABLET | Refills: 0 | Status: SHIPPED | OUTPATIENT
Start: 2025-08-18